# Patient Record
Sex: FEMALE | Race: WHITE | Employment: PART TIME | URBAN - METROPOLITAN AREA
[De-identification: names, ages, dates, MRNs, and addresses within clinical notes are randomized per-mention and may not be internally consistent; named-entity substitution may affect disease eponyms.]

---

## 2017-01-08 ENCOUNTER — HOSPITAL ENCOUNTER (EMERGENCY)
Age: 27
Discharge: HOME | End: 2017-01-08
Payer: COMMERCIAL

## 2017-01-08 ENCOUNTER — APPOINTMENT (OUTPATIENT)
Dept: ULTRASOUND IMAGING | Age: 27
End: 2017-01-08
Payer: COMMERCIAL

## 2017-01-08 VITALS
DIASTOLIC BLOOD PRESSURE: 85 MMHG | OXYGEN SATURATION: 100 % | WEIGHT: 170 LBS | BODY MASS INDEX: 27.32 KG/M2 | TEMPERATURE: 98.4 F | SYSTOLIC BLOOD PRESSURE: 150 MMHG | HEIGHT: 66 IN | RESPIRATION RATE: 18 BRPM | HEART RATE: 110 BPM

## 2017-01-08 DIAGNOSIS — O20.0 THREATENED MISCARRIAGE: Primary | ICD-10-CM

## 2017-01-08 DIAGNOSIS — B96.89 BACTERIAL VAGINOSIS: ICD-10-CM

## 2017-01-08 DIAGNOSIS — N76.0 BACTERIAL VAGINOSIS: ICD-10-CM

## 2017-01-08 DIAGNOSIS — O03.9 MISCARRIAGE: ICD-10-CM

## 2017-01-08 LAB
ALBUMIN SERPL-MCNC: 4.5 G/DL (ref 3.9–4.9)
ALP BLD-CCNC: 45 U/L (ref 40–130)
ALT SERPL-CCNC: 13 U/L (ref 0–33)
ANION GAP SERPL CALCULATED.3IONS-SCNC: 11 MEQ/L (ref 7–13)
AST SERPL-CCNC: 11 U/L (ref 0–35)
BACTERIA: ABNORMAL /HPF
BASOPHILS ABSOLUTE: 0.1 K/UL (ref 0–0.2)
BASOPHILS RELATIVE PERCENT: 0.7 %
BILIRUB SERPL-MCNC: 0.1 MG/DL (ref 0–1.2)
BILIRUBIN URINE: NEGATIVE
BLOOD, URINE: ABNORMAL
BUN BLDV-MCNC: 10 MG/DL (ref 6–20)
CALCIUM SERPL-MCNC: 9.2 MG/DL (ref 8.6–10.2)
CHLORIDE BLD-SCNC: 99 MEQ/L (ref 98–107)
CHP ED QC CHECK: NORMAL
CLARITY: ABNORMAL
CLUE CELLS: ABNORMAL
CO2: 24 MEQ/L (ref 22–29)
COLOR: YELLOW
CREAT SERPL-MCNC: 0.61 MG/DL (ref 0.5–0.9)
EOSINOPHILS ABSOLUTE: 0.4 K/UL (ref 0–0.7)
EOSINOPHILS RELATIVE PERCENT: 3.7 %
EPITHELIAL CELLS, UA: ABNORMAL /HPF
GFR AFRICAN AMERICAN: >60
GFR NON-AFRICAN AMERICAN: >60
GLOBULIN: 2.2 G/DL (ref 2.3–3.5)
GLUCOSE BLD-MCNC: 86 MG/DL (ref 74–109)
GLUCOSE URINE: NEGATIVE MG/DL
GONADOTROPIN, CHORIONIC (HCG) QUANT: NORMAL MIU/ML
HCT VFR BLD CALC: 37.4 % (ref 37–47)
HEMOGLOBIN: 12.8 G/DL (ref 12–16)
KETONES, URINE: ABNORMAL MG/DL
LEUKOCYTE ESTERASE, URINE: ABNORMAL
LYMPHOCYTES ABSOLUTE: 2.6 K/UL (ref 1–4.8)
LYMPHOCYTES RELATIVE PERCENT: 24.7 %
MCH RBC QN AUTO: 30.9 PG (ref 27–31.3)
MCHC RBC AUTO-ENTMCNC: 34.1 % (ref 33–37)
MCV RBC AUTO: 90.7 FL (ref 82–100)
MONOCYTES ABSOLUTE: 0.5 K/UL (ref 0.2–0.8)
MONOCYTES RELATIVE PERCENT: 4.7 %
NEUTROPHILS ABSOLUTE: 6.9 K/UL (ref 1.4–6.5)
NEUTROPHILS RELATIVE PERCENT: 66.2 %
NITRITE, URINE: NEGATIVE
PDW BLD-RTO: 12.7 % (ref 11.5–14.5)
PH UA: 7 (ref 5–9)
PLATELET # BLD: 219 K/UL (ref 130–400)
POTASSIUM SERPL-SCNC: 3.5 MEQ/L (ref 3.5–5.1)
PREGNANCY TEST URINE, POC: NORMAL
PROTEIN UA: 30 MG/DL
RBC # BLD: 4.13 M/UL (ref 4.2–5.4)
RBC UA: ABNORMAL /HPF (ref 0–2)
SODIUM BLD-SCNC: 134 MEQ/L (ref 132–144)
SPECIFIC GRAVITY UA: 1.02 (ref 1–1.03)
TOTAL PROTEIN: 6.7 G/DL (ref 6.4–8.1)
TRICHOMONAS PREP: ABNORMAL
TRICHOMONAS VAGINALIS SCREEN: NEGATIVE
URINE REFLEX TO CULTURE: YES
UROBILINOGEN, URINE: 0.2 E.U./DL
WBC # BLD: 10.4 K/UL (ref 4.8–10.8)
WBC UA: ABNORMAL /HPF (ref 0–5)
YEAST WET PREP: ABNORMAL

## 2017-01-08 PROCEDURE — 99284 EMERGENCY DEPT VISIT MOD MDM: CPT

## 2017-01-08 PROCEDURE — 87660 TRICHOMONAS VAGIN DIR PROBE: CPT

## 2017-01-08 PROCEDURE — 2580000003 HC RX 258: Performed by: PERSONAL EMERGENCY RESPONSE ATTENDANT

## 2017-01-08 PROCEDURE — 84702 CHORIONIC GONADOTROPIN TEST: CPT

## 2017-01-08 PROCEDURE — 86901 BLOOD TYPING SEROLOGIC RH(D): CPT

## 2017-01-08 PROCEDURE — 85025 COMPLETE CBC W/AUTO DIFF WBC: CPT

## 2017-01-08 PROCEDURE — 87086 URINE CULTURE/COLONY COUNT: CPT

## 2017-01-08 PROCEDURE — 80053 COMPREHEN METABOLIC PANEL: CPT

## 2017-01-08 PROCEDURE — 81001 URINALYSIS AUTO W/SCOPE: CPT

## 2017-01-08 PROCEDURE — 76817 TRANSVAGINAL US OBSTETRIC: CPT

## 2017-01-08 PROCEDURE — 36415 COLL VENOUS BLD VENIPUNCTURE: CPT

## 2017-01-08 PROCEDURE — 87491 CHLMYD TRACH DNA AMP PROBE: CPT

## 2017-01-08 PROCEDURE — 87591 N.GONORRHOEAE DNA AMP PROB: CPT

## 2017-01-08 PROCEDURE — 87210 SMEAR WET MOUNT SALINE/INK: CPT

## 2017-01-08 PROCEDURE — 76801 OB US < 14 WKS SINGLE FETUS: CPT

## 2017-01-08 PROCEDURE — 86900 BLOOD TYPING SEROLOGIC ABO: CPT

## 2017-01-08 RX ORDER — 0.9 % SODIUM CHLORIDE 0.9 %
1000 INTRAVENOUS SOLUTION INTRAVENOUS ONCE
Status: COMPLETED | OUTPATIENT
Start: 2017-01-08 | End: 2017-01-08

## 2017-01-08 RX ORDER — NITROFURANTOIN 25; 75 MG/1; MG/1
100 CAPSULE ORAL 2 TIMES DAILY
Qty: 14 CAPSULE | Refills: 0 | Status: SHIPPED | OUTPATIENT
Start: 2017-01-08 | End: 2017-01-12

## 2017-01-08 RX ORDER — METRONIDAZOLE 7.5 MG/G
GEL VAGINAL
Qty: 1 G | Refills: 0 | Status: SHIPPED | OUTPATIENT
Start: 2017-01-08 | End: 2017-01-15

## 2017-01-08 RX ADMIN — SODIUM CHLORIDE 1000 ML: 900 INJECTION, SOLUTION INTRAVENOUS at 20:45

## 2017-01-08 ASSESSMENT — ENCOUNTER SYMPTOMS
FACIAL SWELLING: 0
COUGH: 0
ABDOMINAL PAIN: 1
NAUSEA: 0
SORE THROAT: 0
ANAL BLEEDING: 0
TROUBLE SWALLOWING: 0
BLOOD IN STOOL: 0
DIARRHEA: 0
COLOR CHANGE: 0
VOICE CHANGE: 0
SHORTNESS OF BREATH: 0
VOMITING: 0

## 2017-01-09 LAB — ABO/RH: NORMAL

## 2017-01-10 LAB
CHLAMYDIA TRACHOMATIS AMPLIFIED DET: NEGATIVE
N GONORRHOEAE AMPLIFIED DET: NEGATIVE
SPECIMEN SOURCE: NORMAL
URINE CULTURE, ROUTINE: NORMAL

## 2017-01-17 ENCOUNTER — OFFICE VISIT (OUTPATIENT)
Dept: OBGYN | Age: 27
End: 2017-01-17

## 2017-01-17 VITALS
BODY MASS INDEX: 28.41 KG/M2 | SYSTOLIC BLOOD PRESSURE: 124 MMHG | WEIGHT: 176 LBS | DIASTOLIC BLOOD PRESSURE: 66 MMHG | HEART RATE: 96 BPM

## 2017-01-17 DIAGNOSIS — N91.2 AMENORRHEA: Primary | ICD-10-CM

## 2017-01-17 DIAGNOSIS — N91.2 AMENORRHEA: ICD-10-CM

## 2017-01-17 LAB
ABO/RH: NORMAL
ANTIBODY SCREEN: NORMAL
BASOPHILS ABSOLUTE: 0.1 K/UL (ref 0–0.2)
BASOPHILS RELATIVE PERCENT: 0.9 %
EOSINOPHILS ABSOLUTE: 0.2 K/UL (ref 0–0.7)
EOSINOPHILS RELATIVE PERCENT: 2.8 %
HCT VFR BLD CALC: 37.8 % (ref 37–47)
HEMOGLOBIN: 12.7 G/DL (ref 12–16)
HEPATITIS B SURFACE ANTIGEN INTERPRETATION: NORMAL
LYMPHOCYTES ABSOLUTE: 2.3 K/UL (ref 1–4.8)
LYMPHOCYTES RELATIVE PERCENT: 27 %
MCH RBC QN AUTO: 30.6 PG (ref 27–31.3)
MCHC RBC AUTO-ENTMCNC: 33.5 % (ref 33–37)
MCV RBC AUTO: 91.3 FL (ref 82–100)
MONOCYTES ABSOLUTE: 0.5 K/UL (ref 0.2–0.8)
MONOCYTES RELATIVE PERCENT: 5.4 %
NEUTROPHILS ABSOLUTE: 5.6 K/UL (ref 1.4–6.5)
NEUTROPHILS RELATIVE PERCENT: 63.9 %
PDW BLD-RTO: 12.3 % (ref 11.5–14.5)
PLATELET # BLD: 237 K/UL (ref 130–400)
RBC # BLD: 4.14 M/UL (ref 4.2–5.4)
RUBELLA ANTIBODY IGG: 228.3 IU/ML
WBC # BLD: 8.7 K/UL (ref 4.8–10.8)

## 2017-01-17 PROCEDURE — 99213 OFFICE O/P EST LOW 20 MIN: CPT | Performed by: OBSTETRICS & GYNECOLOGY

## 2017-01-17 RX ORDER — ASPIRIN 81 MG/1
81 TABLET ORAL DAILY
Qty: 30 TABLET | Refills: 3 | Status: ON HOLD | OUTPATIENT
Start: 2017-01-17 | End: 2017-08-17

## 2017-01-18 DIAGNOSIS — N91.2 AMENORRHEA: ICD-10-CM

## 2017-01-18 LAB
AMPHETAMINE SCREEN, URINE: NORMAL
BACTERIA: ABNORMAL /HPF
BARBITURATE SCREEN URINE: NORMAL
BENZODIAZEPINE SCREEN, URINE: NORMAL
BILIRUBIN URINE: NEGATIVE
BLOOD, URINE: ABNORMAL
CANNABINOID SCREEN URINE: NORMAL
CLARITY: ABNORMAL
COCAINE METABOLITE SCREEN URINE: NORMAL
COLOR: YELLOW
CRYSTALS, UA: ABNORMAL
EPITHELIAL CELLS, UA: ABNORMAL /HPF
GLUCOSE URINE: NEGATIVE MG/DL
HEMOGLOBIN A-1 QUANTITATION: 96.9 % (ref 95–97.9)
HEMOGLOBIN A2 QUANTITATION: 2.8 % (ref 2–3.5)
HEMOGLOBIN C QUANTITATION: 0 % (ref 0–0)
HEMOGLOBIN E QUANTITATION: 0 % (ref 0–0)
HEMOGLOBIN ELECTROPHORESIS: NORMAL
HEMOGLOBIN EVALUATION: NORMAL
HEMOGLOBIN F QUANTITATION: 0.3 % (ref 0–2.1)
HEMOGLOBIN OTHER: 0 % (ref 0–0)
HEMOGLOBIN S QUANTITATION: 0 % (ref 0–0)
KETONES, URINE: NEGATIVE MG/DL
LEUKOCYTE ESTERASE, URINE: ABNORMAL
Lab: NORMAL
NITRITE, URINE: NEGATIVE
OPIATE SCREEN URINE: NORMAL
PH UA: 7.5 (ref 5–9)
PHENCYCLIDINE SCREEN URINE: NORMAL
PROTEIN UA: NEGATIVE MG/DL
RBC UA: ABNORMAL /HPF (ref 0–2)
RENAL EPITHELIAL, UA: ABNORMAL /HPF
RPR: NORMAL
SICKLE CELL: NORMAL
SPECIFIC GRAVITY UA: 1.02 (ref 1–1.03)
UROBILINOGEN, URINE: 0.2 E.U./DL
VZV IGG SER QL IA: 868 IV
WBC UA: ABNORMAL /HPF (ref 0–5)

## 2017-01-19 LAB
HIV-1 AND HIV-2 ANTIBODIES: NEGATIVE
URINE CULTURE, ROUTINE: NORMAL

## 2017-01-31 ENCOUNTER — INITIAL PRENATAL (OUTPATIENT)
Dept: OBGYN | Age: 27
End: 2017-01-31

## 2017-01-31 VITALS
DIASTOLIC BLOOD PRESSURE: 80 MMHG | WEIGHT: 171 LBS | HEART RATE: 100 BPM | SYSTOLIC BLOOD PRESSURE: 122 MMHG | BODY MASS INDEX: 27.6 KG/M2

## 2017-01-31 DIAGNOSIS — Z34.81 ENCOUNTER FOR SUPERVISION OF OTHER NORMAL PREGNANCY IN FIRST TRIMESTER: Primary | ICD-10-CM

## 2017-01-31 LAB
BILIRUBIN, POC: NORMAL
BLOOD URINE, POC: NORMAL
CLARITY, POC: NORMAL
COLOR, POC: YELLOW
GLUCOSE URINE, POC: NORMAL
KETONES, POC: NORMAL
LEUKOCYTE EST, POC: NORMAL
NITRITE, POC: NORMAL
PH, POC: 6
PROTEIN, POC: NORMAL
SPECIFIC GRAVITY, POC: 1.03
UROBILINOGEN, POC: NORMAL

## 2017-02-27 ENCOUNTER — ROUTINE PRENATAL (OUTPATIENT)
Dept: OBGYN | Age: 27
End: 2017-02-27

## 2017-02-27 VITALS
BODY MASS INDEX: 27.92 KG/M2 | SYSTOLIC BLOOD PRESSURE: 122 MMHG | HEART RATE: 120 BPM | WEIGHT: 173 LBS | DIASTOLIC BLOOD PRESSURE: 84 MMHG

## 2017-02-27 DIAGNOSIS — Z34.81 ENCOUNTER FOR SUPERVISION OF OTHER NORMAL PREGNANCY IN FIRST TRIMESTER: Primary | ICD-10-CM

## 2017-02-27 DIAGNOSIS — N89.8 VAGINAL DISCHARGE: ICD-10-CM

## 2017-02-27 PROCEDURE — 99213 OFFICE O/P EST LOW 20 MIN: CPT | Performed by: OBSTETRICS & GYNECOLOGY

## 2017-02-27 RX ORDER — CEFUROXIME AXETIL 250 MG/1
250 TABLET ORAL 2 TIMES DAILY
COMMUNITY
End: 2017-07-07

## 2017-02-27 RX ORDER — ONDANSETRON 4 MG/1
4 TABLET, ORALLY DISINTEGRATING ORAL EVERY 8 HOURS PRN
COMMUNITY
End: 2017-07-07

## 2017-03-06 ENCOUNTER — TELEPHONE (OUTPATIENT)
Dept: OBGYN | Age: 27
End: 2017-03-06

## 2017-03-09 ENCOUNTER — TELEPHONE (OUTPATIENT)
Dept: OBGYN | Age: 27
End: 2017-03-09

## 2017-03-09 DIAGNOSIS — R30.0 BURNING WITH URINATION: ICD-10-CM

## 2017-03-09 DIAGNOSIS — R35.0 URINARY FREQUENCY: Primary | ICD-10-CM

## 2017-03-09 DIAGNOSIS — R35.0 URINARY FREQUENCY: ICD-10-CM

## 2017-03-09 DIAGNOSIS — Z34.81 ENCOUNTER FOR SUPERVISION OF OTHER NORMAL PREGNANCY IN FIRST TRIMESTER: ICD-10-CM

## 2017-03-09 LAB
AMORPHOUS: NORMAL
BACTERIA: NORMAL /HPF
BILIRUBIN URINE: NEGATIVE
BILIRUBIN, POC: ABNORMAL
BLOOD URINE, POC: ABNORMAL
BLOOD, URINE: NEGATIVE
CLARITY, POC: ABNORMAL
CLARITY: ABNORMAL
COLOR, POC: YELLOW
COLOR: YELLOW
EPITHELIAL CELLS, UA: NORMAL /HPF
GLUCOSE URINE, POC: ABNORMAL
GLUCOSE URINE: NEGATIVE MG/DL
KETONES, POC: ABNORMAL
KETONES, URINE: NEGATIVE MG/DL
LEUKOCYTE EST, POC: ABNORMAL
LEUKOCYTE ESTERASE, URINE: ABNORMAL
NITRITE, POC: ABNORMAL
NITRITE, URINE: NEGATIVE
PH UA: 8 (ref 5–9)
PH, POC: 8
PROTEIN UA: ABNORMAL MG/DL
PROTEIN, POC: ABNORMAL
RBC UA: NORMAL /HPF (ref 0–2)
SPECIFIC GRAVITY UA: 1.02 (ref 1–1.03)
SPECIFIC GRAVITY, POC: 1.01
UROBILINOGEN, POC: ABNORMAL
UROBILINOGEN, URINE: 0.2 E.U./DL
WBC UA: NORMAL /HPF (ref 0–5)

## 2017-03-09 PROCEDURE — 81003 URINALYSIS AUTO W/O SCOPE: CPT | Performed by: OBSTETRICS & GYNECOLOGY

## 2017-03-09 RX ORDER — NITROFURANTOIN 25; 75 MG/1; MG/1
100 CAPSULE ORAL 2 TIMES DAILY
Qty: 20 CAPSULE | Refills: 0 | Status: SHIPPED | OUTPATIENT
Start: 2017-03-09 | End: 2017-03-19

## 2017-03-11 LAB — URINE CULTURE, ROUTINE: NORMAL

## 2017-03-20 ENCOUNTER — HOSPITAL ENCOUNTER (OUTPATIENT)
Dept: ULTRASOUND IMAGING | Age: 27
Discharge: HOME | End: 2017-03-20
Payer: MEDICAID

## 2017-03-20 PROCEDURE — 76805 OB US >/= 14 WKS SNGL FETUS: CPT

## 2017-03-27 ENCOUNTER — ROUTINE PRENATAL (OUTPATIENT)
Dept: OBGYN | Age: 27
End: 2017-03-27

## 2017-03-27 VITALS
BODY MASS INDEX: 27.76 KG/M2 | WEIGHT: 172 LBS | DIASTOLIC BLOOD PRESSURE: 74 MMHG | SYSTOLIC BLOOD PRESSURE: 102 MMHG | HEART RATE: 120 BPM

## 2017-03-27 DIAGNOSIS — Z34.82 ENCOUNTER FOR SUPERVISION OF OTHER NORMAL PREGNANCY IN SECOND TRIMESTER: ICD-10-CM

## 2017-03-27 DIAGNOSIS — Z34.82 ENCOUNTER FOR SUPERVISION OF OTHER NORMAL PREGNANCY IN SECOND TRIMESTER: Primary | ICD-10-CM

## 2017-03-27 LAB
BACTERIA: NORMAL /HPF
BILIRUBIN URINE: NEGATIVE
BILIRUBIN, POC: ABNORMAL
BLOOD URINE, POC: ABNORMAL
BLOOD, URINE: NEGATIVE
CLARITY, POC: CLEAR
CLARITY: ABNORMAL
COLOR, POC: YELLOW
COLOR: YELLOW
EPITHELIAL CELLS, UA: NORMAL /HPF
GLUCOSE URINE, POC: ABNORMAL
GLUCOSE URINE: NEGATIVE MG/DL
GLUCOSE, 1HR PP: 124 MG/DL (ref 60–140)
KETONES, POC: ABNORMAL
KETONES, URINE: NEGATIVE MG/DL
LEUKOCYTE EST, POC: ABNORMAL
LEUKOCYTE ESTERASE, URINE: ABNORMAL
NITRITE, POC: ABNORMAL
NITRITE, URINE: NEGATIVE
PH UA: 6.5 (ref 5–9)
PH, POC: 6
PROTEIN UA: ABNORMAL MG/DL
PROTEIN, POC: ABNORMAL
RBC UA: NORMAL /HPF (ref 0–2)
SPECIFIC GRAVITY UA: 1.02 (ref 1–1.03)
SPECIFIC GRAVITY, POC: 1.02
UROBILINOGEN, POC: ABNORMAL
UROBILINOGEN, URINE: 0.2 E.U./DL
WBC UA: NORMAL /HPF (ref 0–5)

## 2017-03-27 PROCEDURE — 99213 OFFICE O/P EST LOW 20 MIN: CPT | Performed by: OBSTETRICS & GYNECOLOGY

## 2017-03-29 LAB — URINE CULTURE, ROUTINE: NORMAL

## 2017-03-29 RX ORDER — NITROFURANTOIN 25; 75 MG/1; MG/1
100 CAPSULE ORAL 2 TIMES DAILY
Qty: 20 CAPSULE | Refills: 0 | Status: SHIPPED | OUTPATIENT
Start: 2017-03-29 | End: 2017-04-08

## 2017-04-17 ENCOUNTER — HOSPITAL ENCOUNTER (EMERGENCY)
Age: 27
Discharge: HOME | End: 2017-04-17
Payer: MEDICAID

## 2017-04-17 ENCOUNTER — APPOINTMENT (OUTPATIENT)
Dept: GENERAL RADIOLOGY | Age: 27
End: 2017-04-17
Payer: MEDICAID

## 2017-04-17 VITALS
RESPIRATION RATE: 14 BRPM | HEIGHT: 66 IN | HEART RATE: 93 BPM | DIASTOLIC BLOOD PRESSURE: 69 MMHG | SYSTOLIC BLOOD PRESSURE: 111 MMHG | OXYGEN SATURATION: 98 % | TEMPERATURE: 98.3 F | WEIGHT: 174 LBS | BODY MASS INDEX: 27.97 KG/M2

## 2017-04-17 DIAGNOSIS — M94.0 COSTOCHONDRITIS: Primary | ICD-10-CM

## 2017-04-17 LAB
ALBUMIN SERPL-MCNC: 3.2 G/DL (ref 3.9–4.9)
ALP BLD-CCNC: 52 U/L (ref 40–130)
ALT SERPL-CCNC: 10 U/L (ref 0–33)
AMORPHOUS: NORMAL
ANION GAP SERPL CALCULATED.3IONS-SCNC: 9 MEQ/L (ref 7–13)
AST SERPL-CCNC: 9 U/L (ref 0–35)
BACTERIA: NORMAL /HPF
BASOPHILS ABSOLUTE: 0.1 K/UL (ref 0–0.2)
BASOPHILS RELATIVE PERCENT: 0.6 %
BILIRUB SERPL-MCNC: 0.1 MG/DL (ref 0–1.2)
BILIRUBIN URINE: NEGATIVE
BLOOD, URINE: NEGATIVE
BUN BLDV-MCNC: 8 MG/DL (ref 6–20)
CALCIUM SERPL-MCNC: 8.8 MG/DL (ref 8.6–10.2)
CHLORIDE BLD-SCNC: 103 MEQ/L (ref 98–107)
CLARITY: ABNORMAL
CO2: 23 MEQ/L (ref 22–29)
COLOR: YELLOW
CREAT SERPL-MCNC: 0.71 MG/DL (ref 0.5–0.9)
D DIMER: 0.42 MG/L FEU (ref 0–0.5)
EOSINOPHILS ABSOLUTE: 0.2 K/UL (ref 0–0.7)
EOSINOPHILS RELATIVE PERCENT: 2.3 %
EPITHELIAL CELLS, UA: NORMAL /HPF
GFR AFRICAN AMERICAN: >60
GFR NON-AFRICAN AMERICAN: >60
GLOBULIN: 2.9 G/DL (ref 2.3–3.5)
GLUCOSE BLD-MCNC: 85 MG/DL (ref 74–109)
GLUCOSE URINE: NEGATIVE MG/DL
HCT VFR BLD CALC: 35.4 % (ref 37–47)
HEMOGLOBIN: 11.9 G/DL (ref 12–16)
KETONES, URINE: NEGATIVE MG/DL
LEUKOCYTE ESTERASE, URINE: ABNORMAL
LYMPHOCYTES ABSOLUTE: 2.4 K/UL (ref 1–4.8)
LYMPHOCYTES RELATIVE PERCENT: 22.2 %
MCH RBC QN AUTO: 30.4 PG (ref 27–31.3)
MCHC RBC AUTO-ENTMCNC: 33.7 % (ref 33–37)
MCV RBC AUTO: 90.2 FL (ref 82–100)
MONOCYTES ABSOLUTE: 0.6 K/UL (ref 0.2–0.8)
MONOCYTES RELATIVE PERCENT: 5.8 %
NEUTROPHILS ABSOLUTE: 7.4 K/UL (ref 1.4–6.5)
NEUTROPHILS RELATIVE PERCENT: 69.1 %
NITRITE, URINE: NEGATIVE
PDW BLD-RTO: 12.6 % (ref 11.5–14.5)
PH UA: 7.5 (ref 5–9)
PLATELET # BLD: 201 K/UL (ref 130–400)
POTASSIUM SERPL-SCNC: 3.9 MEQ/L (ref 3.5–5.1)
PROTEIN UA: NEGATIVE MG/DL
RBC # BLD: 3.92 M/UL (ref 4.2–5.4)
RBC UA: NORMAL /HPF (ref 0–2)
SODIUM BLD-SCNC: 135 MEQ/L (ref 132–144)
SPECIFIC GRAVITY UA: 1.02 (ref 1–1.03)
TOTAL CK: 20 U/L (ref 0–170)
TOTAL PROTEIN: 6.1 G/DL (ref 6.4–8.1)
TROPONIN: <0.01 NG/ML (ref 0–0.01)
URINE REFLEX TO CULTURE: YES
UROBILINOGEN, URINE: 0.2 E.U./DL
WBC # BLD: 10.7 K/UL (ref 4.8–10.8)
WBC UA: NORMAL /HPF (ref 0–5)

## 2017-04-17 PROCEDURE — 80053 COMPREHEN METABOLIC PANEL: CPT

## 2017-04-17 PROCEDURE — 85025 COMPLETE CBC W/AUTO DIFF WBC: CPT

## 2017-04-17 PROCEDURE — 36415 COLL VENOUS BLD VENIPUNCTURE: CPT

## 2017-04-17 PROCEDURE — 84484 ASSAY OF TROPONIN QUANT: CPT

## 2017-04-17 PROCEDURE — 87086 URINE CULTURE/COLONY COUNT: CPT

## 2017-04-17 PROCEDURE — 71010 XR CHEST LIMITED: CPT

## 2017-04-17 PROCEDURE — 6360000002 HC RX W HCPCS: Performed by: PHYSICIAN ASSISTANT

## 2017-04-17 PROCEDURE — 81001 URINALYSIS AUTO W/SCOPE: CPT

## 2017-04-17 PROCEDURE — 96374 THER/PROPH/DIAG INJ IV PUSH: CPT

## 2017-04-17 PROCEDURE — 85379 FIBRIN DEGRADATION QUANT: CPT

## 2017-04-17 PROCEDURE — 99285 EMERGENCY DEPT VISIT HI MDM: CPT

## 2017-04-17 PROCEDURE — 93005 ELECTROCARDIOGRAM TRACING: CPT

## 2017-04-17 PROCEDURE — 82550 ASSAY OF CK (CPK): CPT

## 2017-04-17 PROCEDURE — 2580000003 HC RX 258: Performed by: PHYSICIAN ASSISTANT

## 2017-04-17 PROCEDURE — 96375 TX/PRO/DX INJ NEW DRUG ADDON: CPT

## 2017-04-17 RX ORDER — 0.9 % SODIUM CHLORIDE 0.9 %
500 INTRAVENOUS SOLUTION INTRAVENOUS ONCE
Status: COMPLETED | OUTPATIENT
Start: 2017-04-17 | End: 2017-04-17

## 2017-04-17 RX ORDER — ACETAMINOPHEN AND CODEINE PHOSPHATE 300; 30 MG/1; MG/1
1 TABLET ORAL 3 TIMES DAILY PRN
Qty: 10 TABLET | Refills: 0 | Status: SHIPPED | OUTPATIENT
Start: 2017-04-17 | End: 2017-07-07

## 2017-04-17 RX ORDER — MORPHINE SULFATE 4 MG/ML
4 INJECTION, SOLUTION INTRAMUSCULAR; INTRAVENOUS ONCE
Status: COMPLETED | OUTPATIENT
Start: 2017-04-17 | End: 2017-04-17

## 2017-04-17 RX ORDER — ONDANSETRON 2 MG/ML
4 INJECTION INTRAMUSCULAR; INTRAVENOUS ONCE
Status: COMPLETED | OUTPATIENT
Start: 2017-04-17 | End: 2017-04-17

## 2017-04-17 RX ADMIN — SODIUM CHLORIDE 500 ML: 9 INJECTION, SOLUTION INTRAVENOUS at 19:06

## 2017-04-17 RX ADMIN — MORPHINE SULFATE 4 MG: 4 INJECTION, SOLUTION INTRAMUSCULAR; INTRAVENOUS at 19:15

## 2017-04-17 RX ADMIN — ONDANSETRON 4 MG: 2 INJECTION, SOLUTION INTRAMUSCULAR; INTRAVENOUS at 19:15

## 2017-04-17 ASSESSMENT — PAIN DESCRIPTION - ORIENTATION: ORIENTATION: LEFT;OUTER

## 2017-04-17 ASSESSMENT — ENCOUNTER SYMPTOMS
COLOR CHANGE: 0
SHORTNESS OF BREATH: 1
ABDOMINAL DISTENTION: 0
SORE THROAT: 0
STRIDOR: 0
TROUBLE SWALLOWING: 0
CONSTIPATION: 1
ABDOMINAL PAIN: 1
CHOKING: 0
COUGH: 0
VOMITING: 0
CHEST TIGHTNESS: 1
RHINORRHEA: 0
NAUSEA: 0
DIARRHEA: 0
WHEEZING: 0

## 2017-04-17 ASSESSMENT — PAIN SCALES - GENERAL
PAINLEVEL_OUTOF10: 6
PAINLEVEL_OUTOF10: 8

## 2017-04-17 ASSESSMENT — PAIN DESCRIPTION - DESCRIPTORS: DESCRIPTORS: STABBING;ACHING

## 2017-04-17 ASSESSMENT — PAIN DESCRIPTION - PAIN TYPE: TYPE: ACUTE PAIN

## 2017-04-17 ASSESSMENT — PAIN DESCRIPTION - LOCATION: LOCATION: CHEST

## 2017-04-17 ASSESSMENT — PAIN DESCRIPTION - FREQUENCY: FREQUENCY: CONTINUOUS

## 2017-04-18 LAB
EKG ATRIAL RATE: 90 BPM
EKG P AXIS: 1 DEGREES
EKG P-R INTERVAL: 102 MS
EKG Q-T INTERVAL: 382 MS
EKG QRS DURATION: 76 MS
EKG QTC CALCULATION (BAZETT): 467 MS
EKG R AXIS: 40 DEGREES
EKG T AXIS: 28 DEGREES
EKG VENTRICULAR RATE: 90 BPM

## 2017-04-19 LAB — URINE CULTURE, ROUTINE: NORMAL

## 2017-04-24 ENCOUNTER — ROUTINE PRENATAL (OUTPATIENT)
Dept: OBGYN | Age: 27
End: 2017-04-24

## 2017-04-24 VITALS
BODY MASS INDEX: 28.89 KG/M2 | DIASTOLIC BLOOD PRESSURE: 70 MMHG | HEART RATE: 96 BPM | SYSTOLIC BLOOD PRESSURE: 124 MMHG | WEIGHT: 179 LBS

## 2017-04-24 DIAGNOSIS — Z34.82 ENCOUNTER FOR SUPERVISION OF OTHER NORMAL PREGNANCY, SECOND TRIMESTER: Primary | ICD-10-CM

## 2017-04-24 LAB
BILIRUBIN, POC: ABNORMAL
BLOOD URINE, POC: ABNORMAL
CLARITY, POC: CLEAR
COLOR, POC: YELLOW
GLUCOSE URINE, POC: ABNORMAL
KETONES, POC: ABNORMAL
LEUKOCYTE EST, POC: ABNORMAL
NITRITE, POC: ABNORMAL
PH, POC: 8
PROTEIN, POC: ABNORMAL
SPECIFIC GRAVITY, POC: 1.01
UROBILINOGEN, POC: ABNORMAL

## 2017-04-24 PROCEDURE — 99213 OFFICE O/P EST LOW 20 MIN: CPT | Performed by: OBSTETRICS & GYNECOLOGY

## 2017-04-25 DIAGNOSIS — Z34.82 ENCOUNTER FOR SUPERVISION OF OTHER NORMAL PREGNANCY, SECOND TRIMESTER: ICD-10-CM

## 2017-04-25 LAB
AMORPHOUS: NORMAL
BACTERIA: NORMAL /HPF
BILIRUBIN URINE: NEGATIVE
BLOOD, URINE: NEGATIVE
CLARITY: ABNORMAL
COLOR: YELLOW
EPITHELIAL CELLS, UA: NORMAL /HPF
GLUCOSE URINE: NEGATIVE MG/DL
KETONES, URINE: NEGATIVE MG/DL
LEUKOCYTE ESTERASE, URINE: ABNORMAL
NITRITE, URINE: NEGATIVE
PH UA: 7.5 (ref 5–9)
PROTEIN UA: NEGATIVE MG/DL
RBC UA: NORMAL /HPF (ref 0–2)
SPECIFIC GRAVITY UA: 1.02 (ref 1–1.03)
UROBILINOGEN, URINE: 1 E.U./DL
WBC UA: NORMAL /HPF (ref 0–5)

## 2017-04-26 LAB — URINE CULTURE, ROUTINE: NORMAL

## 2017-05-11 ENCOUNTER — HOSPITAL ENCOUNTER (OUTPATIENT)
Dept: ULTRASOUND IMAGING | Age: 27
Discharge: HOME | End: 2017-05-11
Payer: MEDICAID

## 2017-05-11 DIAGNOSIS — Z34.82 ENCOUNTER FOR SUPERVISION OF OTHER NORMAL PREGNANCY, SECOND TRIMESTER: ICD-10-CM

## 2017-05-11 PROCEDURE — 76805 OB US >/= 14 WKS SNGL FETUS: CPT

## 2017-05-15 ENCOUNTER — ROUTINE PRENATAL (OUTPATIENT)
Dept: OBGYN | Age: 27
End: 2017-05-15

## 2017-05-15 VITALS
BODY MASS INDEX: 29.86 KG/M2 | WEIGHT: 185 LBS | DIASTOLIC BLOOD PRESSURE: 82 MMHG | SYSTOLIC BLOOD PRESSURE: 120 MMHG | HEART RATE: 112 BPM

## 2017-05-15 DIAGNOSIS — Z34.82 ENCOUNTER FOR SUPERVISION OF OTHER NORMAL PREGNANCY, SECOND TRIMESTER: ICD-10-CM

## 2017-05-15 DIAGNOSIS — Z34.82 ENCOUNTER FOR SUPERVISION OF OTHER NORMAL PREGNANCY, SECOND TRIMESTER: Primary | ICD-10-CM

## 2017-05-15 LAB
BASOPHILS ABSOLUTE: 0.1 K/UL (ref 0–0.2)
BASOPHILS RELATIVE PERCENT: 0.4 %
BILIRUBIN, POC: ABNORMAL
BLOOD URINE, POC: ABNORMAL
CLARITY, POC: CLEAR
COLOR, POC: YELLOW
EOSINOPHILS ABSOLUTE: 0.1 K/UL (ref 0–0.7)
EOSINOPHILS RELATIVE PERCENT: 1.3 %
GLUCOSE URINE, POC: ABNORMAL
GLUCOSE, 1HR PP: 98 MG/DL (ref 60–140)
HCT VFR BLD CALC: 38.1 % (ref 37–47)
HEMOGLOBIN: 12.3 G/DL (ref 12–16)
KETONES, POC: ABNORMAL
LEUKOCYTE EST, POC: ABNORMAL
LYMPHOCYTES ABSOLUTE: 2.2 K/UL (ref 1–4.8)
LYMPHOCYTES RELATIVE PERCENT: 19 %
MCH RBC QN AUTO: 30.3 PG (ref 27–31.3)
MCHC RBC AUTO-ENTMCNC: 32.2 % (ref 33–37)
MCV RBC AUTO: 94.1 FL (ref 82–100)
MONOCYTES ABSOLUTE: 0.6 K/UL (ref 0.2–0.8)
MONOCYTES RELATIVE PERCENT: 5.2 %
NEUTROPHILS ABSOLUTE: 8.5 K/UL (ref 1.4–6.5)
NEUTROPHILS RELATIVE PERCENT: 74.1 %
NITRITE, POC: ABNORMAL
PDW BLD-RTO: 12.7 % (ref 11.5–14.5)
PH, POC: 8
PLATELET # BLD: 212 K/UL (ref 130–400)
PROTEIN, POC: ABNORMAL
RBC # BLD: 4.05 M/UL (ref 4.2–5.4)
SPECIFIC GRAVITY, POC: 1.01
UROBILINOGEN, POC: ABNORMAL
WBC # BLD: 11.5 K/UL (ref 4.8–10.8)

## 2017-05-15 PROCEDURE — 99213 OFFICE O/P EST LOW 20 MIN: CPT | Performed by: OBSTETRICS & GYNECOLOGY

## 2017-05-16 DIAGNOSIS — Z34.82 ENCOUNTER FOR SUPERVISION OF OTHER NORMAL PREGNANCY, SECOND TRIMESTER: ICD-10-CM

## 2017-05-16 LAB
BACTERIA: NORMAL /HPF
BILIRUBIN URINE: NEGATIVE
BLOOD, URINE: NEGATIVE
CLARITY: CLEAR
COLOR: YELLOW
EPITHELIAL CELLS, UA: NORMAL /HPF
GLUCOSE URINE: NEGATIVE MG/DL
KETONES, URINE: NEGATIVE MG/DL
LEUKOCYTE ESTERASE, URINE: ABNORMAL
NITRITE, URINE: NEGATIVE
PH UA: 8 (ref 5–9)
PROTEIN UA: NEGATIVE MG/DL
RBC UA: NORMAL /HPF (ref 0–2)
SPECIFIC GRAVITY UA: 1.02 (ref 1–1.03)
UROBILINOGEN, URINE: 0.2 E.U./DL
WBC UA: NORMAL /HPF (ref 0–5)

## 2017-05-17 LAB — URINE CULTURE, ROUTINE: NORMAL

## 2017-06-04 ENCOUNTER — HOSPITAL ENCOUNTER (OUTPATIENT)
Age: 27
Discharge: HOME | End: 2017-06-05
Attending: OBSTETRICS & GYNECOLOGY | Admitting: OBSTETRICS & GYNECOLOGY
Payer: MEDICAID

## 2017-06-04 PROCEDURE — 99213 OFFICE O/P EST LOW 20 MIN: CPT | Performed by: OBSTETRICS & GYNECOLOGY

## 2017-06-04 PROCEDURE — 59025 FETAL NON-STRESS TEST: CPT | Performed by: OBSTETRICS & GYNECOLOGY

## 2017-06-04 RX ORDER — ACETAMINOPHEN 325 MG/1
650 TABLET ORAL EVERY 4 HOURS PRN
Status: DISCONTINUED | OUTPATIENT
Start: 2017-06-04 | End: 2017-06-05 | Stop reason: HOSPADM

## 2017-06-04 RX ORDER — ONDANSETRON 2 MG/ML
4 INJECTION INTRAMUSCULAR; INTRAVENOUS EVERY 6 HOURS PRN
Status: DISCONTINUED | OUTPATIENT
Start: 2017-06-04 | End: 2017-06-05 | Stop reason: HOSPADM

## 2017-06-05 ENCOUNTER — ROUTINE PRENATAL (OUTPATIENT)
Dept: OBGYN | Age: 27
End: 2017-06-05

## 2017-06-05 VITALS
DIASTOLIC BLOOD PRESSURE: 76 MMHG | WEIGHT: 191 LBS | SYSTOLIC BLOOD PRESSURE: 124 MMHG | HEART RATE: 108 BPM | BODY MASS INDEX: 30.83 KG/M2

## 2017-06-05 VITALS
RESPIRATION RATE: 16 BRPM | HEIGHT: 66 IN | SYSTOLIC BLOOD PRESSURE: 136 MMHG | BODY MASS INDEX: 29.73 KG/M2 | DIASTOLIC BLOOD PRESSURE: 79 MMHG | HEART RATE: 94 BPM | WEIGHT: 185 LBS | TEMPERATURE: 97.8 F

## 2017-06-05 DIAGNOSIS — Z34.83 ENCOUNTER FOR SUPERVISION OF OTHER NORMAL PREGNANCY, THIRD TRIMESTER: Primary | ICD-10-CM

## 2017-06-05 DIAGNOSIS — Z34.83 ENCOUNTER FOR SUPERVISION OF OTHER NORMAL PREGNANCY, THIRD TRIMESTER: ICD-10-CM

## 2017-06-05 DIAGNOSIS — O47.00 PRETERM UTERINE CONTRACTIONS: ICD-10-CM

## 2017-06-05 LAB
AMORPHOUS: NORMAL
BACTERIA: NORMAL /HPF
BILIRUBIN URINE: NEGATIVE
BILIRUBIN, POC: ABNORMAL
BLOOD URINE, POC: ABNORMAL
BLOOD, URINE: NEGATIVE
CLARITY, POC: CLEAR
CLARITY: ABNORMAL
COLOR, POC: YELLOW
COLOR: YELLOW
EPITHELIAL CELLS, UA: NORMAL /HPF
GLUCOSE URINE, POC: ABNORMAL
GLUCOSE URINE: NEGATIVE MG/DL
KETONES, POC: ABNORMAL
KETONES, URINE: NEGATIVE MG/DL
LEUKOCYTE EST, POC: ABNORMAL
LEUKOCYTE ESTERASE, URINE: ABNORMAL
NITRITE, POC: ABNORMAL
NITRITE, URINE: NEGATIVE
PH UA: 8.5 (ref 5–9)
PH, POC: 9
PROTEIN UA: ABNORMAL MG/DL
PROTEIN, POC: ABNORMAL
RBC UA: NORMAL /HPF (ref 0–2)
SPECIFIC GRAVITY UA: 1.02 (ref 1–1.03)
SPECIFIC GRAVITY, POC: 1.01
UROBILINOGEN, POC: ABNORMAL
UROBILINOGEN, URINE: 0.2 E.U./DL
WBC UA: NORMAL /HPF (ref 0–5)

## 2017-06-05 PROCEDURE — 99283 EMERGENCY DEPT VISIT LOW MDM: CPT

## 2017-06-05 PROCEDURE — 59025 FETAL NON-STRESS TEST: CPT

## 2017-06-05 PROCEDURE — 59025 FETAL NON-STRESS TEST: CPT | Performed by: OBSTETRICS & GYNECOLOGY

## 2017-06-05 PROCEDURE — 99213 OFFICE O/P EST LOW 20 MIN: CPT | Performed by: OBSTETRICS & GYNECOLOGY

## 2017-06-07 LAB — URINE CULTURE, ROUTINE: NORMAL

## 2017-06-20 ENCOUNTER — ROUTINE PRENATAL (OUTPATIENT)
Dept: OBGYN | Age: 27
End: 2017-06-20

## 2017-06-20 VITALS
BODY MASS INDEX: 31.47 KG/M2 | WEIGHT: 195 LBS | DIASTOLIC BLOOD PRESSURE: 64 MMHG | HEART RATE: 104 BPM | SYSTOLIC BLOOD PRESSURE: 122 MMHG

## 2017-06-20 DIAGNOSIS — Z34.83 ENCOUNTER FOR SUPERVISION OF OTHER NORMAL PREGNANCY, THIRD TRIMESTER: ICD-10-CM

## 2017-06-20 DIAGNOSIS — Z34.83 ENCOUNTER FOR SUPERVISION OF OTHER NORMAL PREGNANCY, THIRD TRIMESTER: Primary | ICD-10-CM

## 2017-06-20 LAB
AMORPHOUS: ABNORMAL
BACTERIA: ABNORMAL /HPF
BILIRUBIN URINE: NEGATIVE
BILIRUBIN, POC: ABNORMAL
BLOOD URINE, POC: ABNORMAL
BLOOD, URINE: NEGATIVE
CLARITY, POC: ABNORMAL
CLARITY: ABNORMAL
COLOR, POC: YELLOW
COLOR: YELLOW
EPITHELIAL CELLS, UA: ABNORMAL /HPF
GLUCOSE URINE, POC: ABNORMAL
GLUCOSE URINE: NEGATIVE MG/DL
KETONES, POC: ABNORMAL
KETONES, URINE: NEGATIVE MG/DL
LEUKOCYTE EST, POC: ABNORMAL
LEUKOCYTE ESTERASE, URINE: ABNORMAL
NITRITE, POC: ABNORMAL
NITRITE, URINE: NEGATIVE
PH UA: 7.5 (ref 5–9)
PH, POC: 7
PROTEIN UA: 30 MG/DL
PROTEIN, POC: ABNORMAL
RBC UA: ABNORMAL /HPF (ref 0–2)
SPECIFIC GRAVITY UA: 1.02 (ref 1–1.03)
SPECIFIC GRAVITY, POC: 1.01
UROBILINOGEN, POC: ABNORMAL
UROBILINOGEN, URINE: 0.2 E.U./DL
WBC UA: ABNORMAL /HPF (ref 0–5)

## 2017-06-20 PROCEDURE — 99213 OFFICE O/P EST LOW 20 MIN: CPT | Performed by: OBSTETRICS & GYNECOLOGY

## 2017-06-22 LAB — URINE CULTURE, ROUTINE: NORMAL

## 2017-06-23 RX ORDER — NITROFURANTOIN 25; 75 MG/1; MG/1
100 CAPSULE ORAL 2 TIMES DAILY
Qty: 20 CAPSULE | Refills: 0 | Status: SHIPPED | OUTPATIENT
Start: 2017-06-23 | End: 2017-07-03

## 2017-07-07 ENCOUNTER — ROUTINE PRENATAL (OUTPATIENT)
Dept: OBGYN | Age: 27
End: 2017-07-07

## 2017-07-07 VITALS
HEART RATE: 104 BPM | WEIGHT: 199.8 LBS | BODY MASS INDEX: 32.25 KG/M2 | DIASTOLIC BLOOD PRESSURE: 70 MMHG | SYSTOLIC BLOOD PRESSURE: 108 MMHG

## 2017-07-07 DIAGNOSIS — Z34.83 ENCOUNTER FOR SUPERVISION OF OTHER NORMAL PREGNANCY, THIRD TRIMESTER: Primary | ICD-10-CM

## 2017-07-07 DIAGNOSIS — Z34.83 ENCOUNTER FOR SUPERVISION OF OTHER NORMAL PREGNANCY, THIRD TRIMESTER: ICD-10-CM

## 2017-07-07 LAB
BACTERIA: ABNORMAL /HPF
BILIRUBIN URINE: NEGATIVE
BILIRUBIN, POC: ABNORMAL
BLOOD URINE, POC: ABNORMAL
BLOOD, URINE: NEGATIVE
CLARITY, POC: CLEAR
CLARITY: ABNORMAL
COLOR, POC: YELLOW
COLOR: YELLOW
EPITHELIAL CELLS, UA: ABNORMAL /HPF
GLUCOSE URINE, POC: ABNORMAL
GLUCOSE URINE: NEGATIVE MG/DL
KETONES, POC: ABNORMAL
KETONES, URINE: NEGATIVE MG/DL
LEUKOCYTE EST, POC: ABNORMAL
LEUKOCYTE ESTERASE, URINE: ABNORMAL
NITRITE, POC: ABNORMAL
NITRITE, URINE: NEGATIVE
PH UA: 8 (ref 5–9)
PH, POC: 7
PROTEIN UA: ABNORMAL MG/DL
PROTEIN, POC: ABNORMAL
RBC UA: ABNORMAL /HPF (ref 0–2)
SPECIFIC GRAVITY UA: 1.02 (ref 1–1.03)
SPECIFIC GRAVITY, POC: 1.02
UROBILINOGEN, POC: ABNORMAL
UROBILINOGEN, URINE: 0.2 E.U./DL
WBC UA: ABNORMAL /HPF (ref 0–5)

## 2017-07-07 PROCEDURE — 99213 OFFICE O/P EST LOW 20 MIN: CPT | Performed by: OBSTETRICS & GYNECOLOGY

## 2017-07-07 PROCEDURE — G8417 CALC BMI ABV UP PARAM F/U: HCPCS | Performed by: OBSTETRICS & GYNECOLOGY

## 2017-07-07 PROCEDURE — G8427 DOCREV CUR MEDS BY ELIG CLIN: HCPCS | Performed by: OBSTETRICS & GYNECOLOGY

## 2017-07-07 PROCEDURE — 1036F TOBACCO NON-USER: CPT | Performed by: OBSTETRICS & GYNECOLOGY

## 2017-07-09 LAB — URINE CULTURE, ROUTINE: NORMAL

## 2017-07-19 ENCOUNTER — ROUTINE PRENATAL (OUTPATIENT)
Dept: OBGYN | Age: 27
End: 2017-07-19

## 2017-07-19 VITALS
SYSTOLIC BLOOD PRESSURE: 115 MMHG | BODY MASS INDEX: 32.77 KG/M2 | DIASTOLIC BLOOD PRESSURE: 79 MMHG | WEIGHT: 203 LBS | HEART RATE: 88 BPM

## 2017-07-19 DIAGNOSIS — Z34.83 ENCOUNTER FOR SUPERVISION OF OTHER NORMAL PREGNANCY IN THIRD TRIMESTER: ICD-10-CM

## 2017-07-19 DIAGNOSIS — Z34.83 ENCOUNTER FOR SUPERVISION OF OTHER NORMAL PREGNANCY, THIRD TRIMESTER: ICD-10-CM

## 2017-07-19 DIAGNOSIS — Z34.83 ENCOUNTER FOR SUPERVISION OF OTHER NORMAL PREGNANCY IN THIRD TRIMESTER: Primary | ICD-10-CM

## 2017-07-19 LAB
BACTERIA: ABNORMAL /HPF
BILIRUBIN URINE: NEGATIVE
BILIRUBIN, POC: ABNORMAL
BLOOD URINE, POC: ABNORMAL
BLOOD, URINE: NEGATIVE
CLARITY, POC: ABNORMAL
CLARITY: ABNORMAL
COLOR, POC: YELLOW
COLOR: YELLOW
CRYSTALS, UA: ABNORMAL
EPITHELIAL CELLS, UA: ABNORMAL /HPF
GLUCOSE URINE, POC: ABNORMAL
GLUCOSE URINE: NEGATIVE MG/DL
KETONES, POC: ABNORMAL
KETONES, URINE: NEGATIVE MG/DL
LEUKOCYTE EST, POC: ABNORMAL
LEUKOCYTE ESTERASE, URINE: ABNORMAL
NITRITE, POC: ABNORMAL
NITRITE, URINE: NEGATIVE
PH UA: 6.5 (ref 5–9)
PH, POC: 6
PROTEIN UA: ABNORMAL MG/DL
PROTEIN, POC: ABNORMAL
RBC UA: ABNORMAL /HPF (ref 0–2)
RENAL EPITHELIAL, UA: ABNORMAL /HPF
SPECIFIC GRAVITY UA: 1.02 (ref 1–1.03)
SPECIFIC GRAVITY, POC: 1.03
UROBILINOGEN, POC: ABNORMAL
UROBILINOGEN, URINE: 0.2 E.U./DL
WBC UA: ABNORMAL /HPF (ref 0–5)

## 2017-07-19 PROCEDURE — 1036F TOBACCO NON-USER: CPT | Performed by: OBSTETRICS & GYNECOLOGY

## 2017-07-19 PROCEDURE — 99213 OFFICE O/P EST LOW 20 MIN: CPT | Performed by: OBSTETRICS & GYNECOLOGY

## 2017-07-19 PROCEDURE — G8427 DOCREV CUR MEDS BY ELIG CLIN: HCPCS | Performed by: OBSTETRICS & GYNECOLOGY

## 2017-07-19 PROCEDURE — G8417 CALC BMI ABV UP PARAM F/U: HCPCS | Performed by: OBSTETRICS & GYNECOLOGY

## 2017-07-19 RX ORDER — NITROFURANTOIN 25; 75 MG/1; MG/1
100 CAPSULE ORAL 2 TIMES DAILY
Qty: 20 CAPSULE | Refills: 0 | Status: SHIPPED | OUTPATIENT
Start: 2017-07-19 | End: 2017-07-29

## 2017-07-21 LAB — URINE CULTURE, ROUTINE: NORMAL

## 2017-07-26 ENCOUNTER — ROUTINE PRENATAL (OUTPATIENT)
Dept: OBGYN | Age: 27
End: 2017-07-26

## 2017-07-26 VITALS
DIASTOLIC BLOOD PRESSURE: 84 MMHG | WEIGHT: 206 LBS | SYSTOLIC BLOOD PRESSURE: 132 MMHG | HEART RATE: 116 BPM | BODY MASS INDEX: 33.25 KG/M2

## 2017-07-26 DIAGNOSIS — Z34.83 ENCOUNTER FOR SUPERVISION OF OTHER NORMAL PREGNANCY, THIRD TRIMESTER: Primary | ICD-10-CM

## 2017-07-26 DIAGNOSIS — O47.00 PRETERM UTERINE CONTRACTIONS: ICD-10-CM

## 2017-07-26 LAB
BACTERIA: ABNORMAL /HPF
BILIRUBIN URINE: NEGATIVE
BLOOD, URINE: NEGATIVE
CLARITY: ABNORMAL
COLOR: ABNORMAL
CRYSTALS, UA: ABNORMAL
EPITHELIAL CELLS, UA: ABNORMAL /HPF
GLUCOSE URINE: NEGATIVE MG/DL
KETONES, URINE: NEGATIVE MG/DL
LEUKOCYTE ESTERASE, URINE: ABNORMAL
NITRITE, URINE: NEGATIVE
PH UA: 6.5 (ref 5–9)
PROTEIN UA: ABNORMAL MG/DL
RBC UA: ABNORMAL /HPF (ref 0–2)
SPECIFIC GRAVITY UA: 1.02 (ref 1–1.03)
UROBILINOGEN, URINE: 0.2 E.U./DL
WBC UA: ABNORMAL /HPF (ref 0–5)

## 2017-07-26 PROCEDURE — 99213 OFFICE O/P EST LOW 20 MIN: CPT | Performed by: OBSTETRICS & GYNECOLOGY

## 2017-07-28 LAB — URINE CULTURE, ROUTINE: NORMAL

## 2017-08-01 ENCOUNTER — ROUTINE PRENATAL (OUTPATIENT)
Dept: OBGYN | Age: 27
End: 2017-08-01

## 2017-08-01 VITALS
WEIGHT: 208.8 LBS | DIASTOLIC BLOOD PRESSURE: 82 MMHG | SYSTOLIC BLOOD PRESSURE: 136 MMHG | HEART RATE: 116 BPM | BODY MASS INDEX: 33.7 KG/M2

## 2017-08-01 DIAGNOSIS — O47.00 PRETERM UTERINE CONTRACTIONS: ICD-10-CM

## 2017-08-01 DIAGNOSIS — Z34.83 ENCOUNTER FOR SUPERVISION OF OTHER NORMAL PREGNANCY, THIRD TRIMESTER: Primary | ICD-10-CM

## 2017-08-01 LAB
BILIRUBIN, POC: NORMAL
BLOOD URINE, POC: NORMAL
CLARITY, POC: CLEAR
COLOR, POC: YELLOW
GLUCOSE URINE, POC: NORMAL
KETONES, POC: NORMAL
LEUKOCYTE EST, POC: NORMAL
NITRITE, POC: NORMAL
PH, POC: 6
PROTEIN, POC: NORMAL
SPECIFIC GRAVITY, POC: 1.02
UROBILINOGEN, POC: NORMAL

## 2017-08-01 PROCEDURE — 99213 OFFICE O/P EST LOW 20 MIN: CPT | Performed by: OBSTETRICS & GYNECOLOGY

## 2017-08-08 ENCOUNTER — ROUTINE PRENATAL (OUTPATIENT)
Dept: OBGYN | Age: 27
End: 2017-08-08

## 2017-08-08 VITALS
SYSTOLIC BLOOD PRESSURE: 120 MMHG | WEIGHT: 210 LBS | BODY MASS INDEX: 33.89 KG/M2 | DIASTOLIC BLOOD PRESSURE: 84 MMHG | HEART RATE: 120 BPM

## 2017-08-08 DIAGNOSIS — Z34.83 ENCOUNTER FOR SUPERVISION OF OTHER NORMAL PREGNANCY, THIRD TRIMESTER: Primary | ICD-10-CM

## 2017-08-08 LAB
BILIRUBIN, POC: NORMAL
BLOOD URINE, POC: NORMAL
CLARITY, POC: CLEAR
COLOR, POC: YELLOW
GLUCOSE URINE, POC: NORMAL
KETONES, POC: NORMAL
LEUKOCYTE EST, POC: NORMAL
NITRITE, POC: NORMAL
PH, POC: 6.5
PROTEIN, POC: NORMAL
SPECIFIC GRAVITY, POC: 1.02
UROBILINOGEN, POC: NORMAL

## 2017-08-08 PROCEDURE — 99213 OFFICE O/P EST LOW 20 MIN: CPT | Performed by: OBSTETRICS & GYNECOLOGY

## 2017-08-11 ENCOUNTER — HOSPITAL ENCOUNTER (OUTPATIENT)
Age: 27
Discharge: HOME | End: 2017-08-12
Attending: OBSTETRICS & GYNECOLOGY | Admitting: OBSTETRICS & GYNECOLOGY
Payer: MEDICARE

## 2017-08-11 LAB
BACTERIA: ABNORMAL /HPF
BILIRUBIN URINE: NEGATIVE
BLOOD, URINE: NEGATIVE
CLARITY: ABNORMAL
COLOR: YELLOW
EPITHELIAL CELLS, UA: ABNORMAL /HPF
GLUCOSE URINE: NEGATIVE MG/DL
KETONES, URINE: NEGATIVE MG/DL
LEUKOCYTE ESTERASE, URINE: ABNORMAL
NITRITE, URINE: NEGATIVE
PH UA: 7.5 (ref 5–9)
PROTEIN UA: NEGATIVE MG/DL
RBC UA: ABNORMAL /HPF (ref 0–2)
SPECIFIC GRAVITY UA: 1.01 (ref 1–1.03)
UROBILINOGEN, URINE: 0.2 E.U./DL
WBC UA: ABNORMAL /HPF (ref 0–5)

## 2017-08-11 PROCEDURE — 81001 URINALYSIS AUTO W/SCOPE: CPT

## 2017-08-11 PROCEDURE — 6370000000 HC RX 637 (ALT 250 FOR IP): Performed by: OBSTETRICS & GYNECOLOGY

## 2017-08-11 RX ORDER — ONDANSETRON 2 MG/ML
4 INJECTION INTRAMUSCULAR; INTRAVENOUS EVERY 6 HOURS PRN
Status: DISCONTINUED | OUTPATIENT
Start: 2017-08-11 | End: 2017-08-12 | Stop reason: HOSPADM

## 2017-08-11 RX ORDER — ACETAMINOPHEN 325 MG/1
650 TABLET ORAL EVERY 4 HOURS PRN
Status: DISCONTINUED | OUTPATIENT
Start: 2017-08-11 | End: 2017-08-12 | Stop reason: HOSPADM

## 2017-08-11 RX ORDER — SODIUM CHLORIDE 0.9 % (FLUSH) 0.9 %
10 SYRINGE (ML) INJECTION PRN
Status: DISCONTINUED | OUTPATIENT
Start: 2017-08-11 | End: 2017-08-12 | Stop reason: HOSPADM

## 2017-08-11 RX ORDER — SODIUM CHLORIDE 0.9 % (FLUSH) 0.9 %
10 SYRINGE (ML) INJECTION EVERY 12 HOURS SCHEDULED
Status: DISCONTINUED | OUTPATIENT
Start: 2017-08-11 | End: 2017-08-12 | Stop reason: HOSPADM

## 2017-08-11 RX ADMIN — ACETAMINOPHEN 650 MG: 325 TABLET ORAL at 22:56

## 2017-08-11 ASSESSMENT — PAIN DESCRIPTION - DESCRIPTORS
DESCRIPTORS: ACHING
DESCRIPTORS: ACHING

## 2017-08-11 ASSESSMENT — PAIN DESCRIPTION - PROGRESSION: CLINICAL_PROGRESSION: GRADUALLY IMPROVING

## 2017-08-11 ASSESSMENT — PAIN DESCRIPTION - PAIN TYPE
TYPE: ACUTE PAIN
TYPE: ACUTE PAIN

## 2017-08-11 ASSESSMENT — PAIN DESCRIPTION - ONSET: ONSET: ON-GOING

## 2017-08-11 ASSESSMENT — PAIN DESCRIPTION - LOCATION
LOCATION: HEAD
LOCATION: HEAD

## 2017-08-11 ASSESSMENT — PAIN SCALES - GENERAL
PAINLEVEL_OUTOF10: 6
PAINLEVEL_OUTOF10: 3

## 2017-08-11 NOTE — IP AVS SNAPSHOT
? Review your current list of  medications, immunization, and allergies. ? Review your future test results online . ? Review your discharge instructions provided by your caregivers at discharge    Certain functionality such as prescription refills, scheduling appointments or sending messages to your provider are not activated if your provider does not use [facility] in his/her office    For questions regarding your MyChart account call [phone]. If you have a clinical question, please call your doctor's office. The information on all pages of the After Visit Summary has been reviewed with me, the patient and/or responsible adult, by my health care provider(s). I had the opportunity to ask questions regarding this information. I understand I should dispose of my armband safely at home to protect my health information. A complete copy of the After Visit Summary has been given to me, the patient and/or responsible adult. Patient Signature/Responsible Adult:____________________    Clinician Signature:_____________________    Date:_____________________    Time:_____________________        More Information           Kevan Litten Contractions: Care Instructions  Your Care Instructions  Jonathan Cramer contractions prepare your uterus for labor. Think of them as a \"warm-up\" exercise that your body does. You may begin to feel them between the 28th and 30th weeks of your pregnancy. But they start as early as the 20th week. Posey Cramer contractions usually occur more often during the ninth month. They may go away when you are active and return when you rest. These contractions are like mild contractions of true labor, but they occur less often. (You feel fewer than 8 in an hour.) They don't cause your cervix to open. It may be hard for you to tell the difference between Kevan Litten contractions and true labor, especially in your first pregnancy. Follow-up care is a key part of your treatment and safety. Be sure to make and go to all appointments, and call your doctor if you are having problems. It's also a good idea to know your test results and keep a list of the medicines you take. How can you care for yourself at home? · Try a warm bath to help relieve muscle tension and reduce pain. · Change positions every 30 minutes. Take breaks if you must sit for a long time. Get up and walk around. · Drink plenty of water, enough so that your urine is light yellow or clear like water. · Taking short walks may help you feel better. Your doctor needs to check any contractions that are getting stronger or closer together. Where can you learn more? Go to [email or web address]://[email or web address] org and sign in to your [employer] account. Enter Q533 in the [place] box to learn more about \"[place] Cramer Contractions: Care Instructions. \"     If you do not have an account, please click on the \"Sign Up Now\" link. Current as of: May 30, 2016  Content Version: 11.2  © 2709-6997 [employer]. Care instructions adapted under license by [facility] [employer] ([facility]). If you have questions about a medical condition or this instruction, always ask your healthcare professional. [address] any warranty or liability for your use of this information.

## 2017-08-12 VITALS
DIASTOLIC BLOOD PRESSURE: 61 MMHG | HEIGHT: 66 IN | SYSTOLIC BLOOD PRESSURE: 111 MMHG | BODY MASS INDEX: 34.42 KG/M2 | RESPIRATION RATE: 18 BRPM | HEART RATE: 94 BPM | WEIGHT: 214.2 LBS | TEMPERATURE: 98.3 F

## 2017-08-12 PROBLEM — O47.03 FALSE LABOR BEFORE 37 COMPLETED WEEKS OF GESTATION IN THIRD TRIMESTER: Status: ACTIVE | Noted: 2017-08-12

## 2017-08-12 LAB
AMPHETAMINE SCREEN, URINE: NORMAL
BARBITURATE SCREEN URINE: NORMAL
BENZODIAZEPINE SCREEN, URINE: NORMAL
CANNABINOID SCREEN URINE: NORMAL
COCAINE METABOLITE SCREEN URINE: NORMAL
Lab: NORMAL
OPIATE SCREEN URINE: NORMAL
PHENCYCLIDINE SCREEN URINE: NORMAL

## 2017-08-12 PROCEDURE — 59025 FETAL NON-STRESS TEST: CPT

## 2017-08-12 PROCEDURE — 6370000000 HC RX 637 (ALT 250 FOR IP): Performed by: OBSTETRICS & GYNECOLOGY

## 2017-08-12 PROCEDURE — 99284 EMERGENCY DEPT VISIT MOD MDM: CPT

## 2017-08-12 PROCEDURE — 99218 PR INITIAL OBSERVATION CARE/DAY 30 MINUTES: CPT | Performed by: OBSTETRICS & GYNECOLOGY

## 2017-08-12 PROCEDURE — 80307 DRUG TEST PRSMV CHEM ANLYZR: CPT

## 2017-08-12 RX ADMIN — ACETAMINOPHEN 650 MG: 325 TABLET ORAL at 07:32

## 2017-08-12 ASSESSMENT — PAIN SCALES - GENERAL: PAINLEVEL_OUTOF10: 5

## 2017-08-12 NOTE — PROGRESS NOTES
Department of Obstetrics and Gynecology  Labor and Delivery  OB hospitalist Triage Note      SUBJECTIVE:  Feeling contractions for the last 2-3 days    OBJECTIVEPt came to triage with C/O feeling contractions for the last 2-3 days. No leakage of fluid. Pt was checked by RN and was reported to be 3 cm and 70% effaced. Vertex presentation. Pt was admitted under observation. Pt stopped having contractions and she slept. In the morning at 6 am, I checked the pt. She is having regular contractions now again. On pelvic exam , she is 2 cm, soft cervix , 60% effaced, vertex but -4 , floating  There has been no progress in dilatation of cervix. Pt is GBS positive    Vitals:  /61  Pulse 85  Temp 98.4 °F (36.9 °C) (Oral)   Resp 18  Ht 5' 6\" (1.676 m)  Wt 214 lb 3.2 oz (97.2 kg)  LMP 11/17/2016 (Approximate)  BMI 34.57 kg/m2    CONSTITUTIONAL:  awake, alert, cooperative, no apparent distress, and appears stated age    Cervix:             Dilation:  2 cm         Effacement:  60%         Station:  -4 cm         Consistency:  soft         Position:  mid    Fetal Position:  Cephalic    Membranes:  Intact    Fetal heart rate:         Baseline Heart Rate:  140-150        Accelerations:  present         Variability:  moderate    Contraction frequency: 2 minutes    Pelvic Ultrasound:  Not done                      DATA:  CBC:   Lab Results   Component Value Date    WBC 11.5 05/15/2017    RBC 4.05 05/15/2017    HGB 12.3 05/15/2017    HCT 38.1 05/15/2017    MCV 94.1 05/15/2017    RDW 12.7 05/15/2017     05/15/2017       ASSESSMENT & PLAN:      Patient Active Hospital Problem List:   False labor before 37 completed weeks of gestation in third trimester (8/12/2017)    Assessment: 38 wks gestation in False labor? Plan: follow up  Repeat pelvic exam in 2  Hrs for progress of labor  Pt will be signed out to Dr Emanuel Deleon at [address] [place] [address] today.

## 2017-08-15 ENCOUNTER — PREP FOR PROCEDURE (OUTPATIENT)
Dept: OBGYN | Age: 27
End: 2017-08-15

## 2017-08-15 ENCOUNTER — ROUTINE PRENATAL (OUTPATIENT)
Dept: OBGYN | Age: 27
End: 2017-08-15

## 2017-08-15 VITALS — HEART RATE: 116 BPM | SYSTOLIC BLOOD PRESSURE: 126 MMHG | DIASTOLIC BLOOD PRESSURE: 84 MMHG

## 2017-08-15 DIAGNOSIS — O47.00 PRETERM UTERINE CONTRACTIONS: ICD-10-CM

## 2017-08-15 DIAGNOSIS — Z34.83 ENCOUNTER FOR SUPERVISION OF OTHER NORMAL PREGNANCY, THIRD TRIMESTER: Primary | ICD-10-CM

## 2017-08-15 LAB
BILIRUBIN, POC: NORMAL
BLOOD URINE, POC: NORMAL
CLARITY, POC: CLEAR
COLOR, POC: YELLOW
GLUCOSE URINE, POC: NORMAL
KETONES, POC: NORMAL
LEUKOCYTE EST, POC: NORMAL
NITRITE, POC: NORMAL
PH, POC: 7
PROTEIN, POC: NORMAL
SPECIFIC GRAVITY, POC: 1.02
UROBILINOGEN, POC: NORMAL

## 2017-08-15 PROCEDURE — 99213 OFFICE O/P EST LOW 20 MIN: CPT | Performed by: OBSTETRICS & GYNECOLOGY

## 2017-08-15 RX ORDER — SODIUM CHLORIDE 0.9 % (FLUSH) 0.9 %
10 SYRINGE (ML) INJECTION EVERY 12 HOURS SCHEDULED
Status: CANCELLED | OUTPATIENT
Start: 2017-08-15

## 2017-08-15 RX ORDER — SODIUM CHLORIDE 0.9 % (FLUSH) 0.9 %
10 SYRINGE (ML) INJECTION PRN
Status: CANCELLED | OUTPATIENT
Start: 2017-08-15

## 2017-08-15 RX ORDER — SODIUM CHLORIDE, SODIUM LACTATE, POTASSIUM CHLORIDE, CALCIUM CHLORIDE 600; 310; 30; 20 MG/100ML; MG/100ML; MG/100ML; MG/100ML
INJECTION, SOLUTION INTRAVENOUS CONTINUOUS
Status: CANCELLED | OUTPATIENT
Start: 2017-08-15

## 2017-08-15 RX ORDER — NALBUPHINE HCL 10 MG/ML
10 AMPUL (ML) INJECTION
Status: CANCELLED | OUTPATIENT
Start: 2017-08-15

## 2017-08-15 RX ORDER — ZOLPIDEM TARTRATE 5 MG/1
5 TABLET ORAL NIGHTLY PRN
Status: CANCELLED | OUTPATIENT
Start: 2017-08-15

## 2017-08-15 RX ORDER — DOCUSATE SODIUM 100 MG/1
100 CAPSULE, LIQUID FILLED ORAL 2 TIMES DAILY
Status: CANCELLED | OUTPATIENT
Start: 2017-08-15

## 2017-08-15 RX ORDER — ONDANSETRON 2 MG/ML
4 INJECTION INTRAMUSCULAR; INTRAVENOUS EVERY 6 HOURS PRN
Status: CANCELLED | OUTPATIENT
Start: 2017-08-15

## 2017-08-17 ENCOUNTER — HOSPITAL ENCOUNTER (OUTPATIENT)
Age: 27
Discharge: HOME | End: 2017-08-17
Attending: OBSTETRICS & GYNECOLOGY | Admitting: OBSTETRICS & GYNECOLOGY
Payer: MEDICARE

## 2017-08-17 VITALS
HEART RATE: 82 BPM | SYSTOLIC BLOOD PRESSURE: 127 MMHG | DIASTOLIC BLOOD PRESSURE: 69 MMHG | BODY MASS INDEX: 34.54 KG/M2 | RESPIRATION RATE: 18 BRPM | OXYGEN SATURATION: 97 % | WEIGHT: 214 LBS | TEMPERATURE: 97.6 F

## 2017-08-17 PROBLEM — O47.9 LABOR, FALSE: Status: ACTIVE | Noted: 2017-08-17

## 2017-08-17 LAB
ALBUMIN SERPL-MCNC: 3 G/DL (ref 3.9–4.9)
ALP BLD-CCNC: 116 U/L (ref 40–130)
ALT SERPL-CCNC: 9 U/L (ref 0–33)
AMPHETAMINE SCREEN, URINE: NORMAL
ANION GAP SERPL CALCULATED.3IONS-SCNC: 13 MEQ/L (ref 7–13)
AST SERPL-CCNC: 10 U/L (ref 0–35)
BACTERIA: ABNORMAL /HPF
BARBITURATE SCREEN URINE: NORMAL
BASOPHILS ABSOLUTE: 0.1 K/UL (ref 0–0.2)
BASOPHILS RELATIVE PERCENT: 0.8 %
BENZODIAZEPINE SCREEN, URINE: NORMAL
BILIRUB SERPL-MCNC: 0.1 MG/DL (ref 0–1.2)
BILIRUBIN URINE: NEGATIVE
BLOOD, URINE: NEGATIVE
BUN BLDV-MCNC: 7 MG/DL (ref 6–20)
CALCIUM SERPL-MCNC: 8.4 MG/DL (ref 8.6–10.2)
CANNABINOID SCREEN URINE: NORMAL
CHLORIDE BLD-SCNC: 103 MEQ/L (ref 98–107)
CLARITY: ABNORMAL
CO2: 20 MEQ/L (ref 22–29)
COCAINE METABOLITE SCREEN URINE: NORMAL
COLOR: YELLOW
CREAT SERPL-MCNC: 0.58 MG/DL (ref 0.5–0.9)
EOSINOPHILS ABSOLUTE: 0.1 K/UL (ref 0–0.7)
EOSINOPHILS RELATIVE PERCENT: 0.9 %
EPITHELIAL CELLS, UA: ABNORMAL /HPF
GFR AFRICAN AMERICAN: >60
GFR NON-AFRICAN AMERICAN: >60
GLOBULIN: 2.6 G/DL (ref 2.3–3.5)
GLUCOSE BLD-MCNC: 89 MG/DL (ref 74–109)
GLUCOSE URINE: NEGATIVE MG/DL
HCT VFR BLD CALC: 33.2 % (ref 37–47)
HEMOGLOBIN: 11.1 G/DL (ref 12–16)
KETONES, URINE: NEGATIVE MG/DL
LEUKOCYTE ESTERASE, URINE: ABNORMAL
LYMPHOCYTES ABSOLUTE: 2.5 K/UL (ref 1–4.8)
LYMPHOCYTES RELATIVE PERCENT: 29.8 %
Lab: NORMAL
MCH RBC QN AUTO: 30 PG (ref 27–31.3)
MCHC RBC AUTO-ENTMCNC: 33.4 % (ref 33–37)
MCV RBC AUTO: 89.8 FL (ref 82–100)
MONOCYTES ABSOLUTE: 0.5 K/UL (ref 0.2–0.8)
MONOCYTES RELATIVE PERCENT: 5.5 %
NEUTROPHILS ABSOLUTE: 5.3 K/UL (ref 1.4–6.5)
NEUTROPHILS RELATIVE PERCENT: 63 %
NITRITE, URINE: NEGATIVE
OPIATE SCREEN URINE: NORMAL
PDW BLD-RTO: 13.3 % (ref 11.5–14.5)
PH UA: 7.5 (ref 5–9)
PHENCYCLIDINE SCREEN URINE: NORMAL
PLATELET # BLD: 151 K/UL (ref 130–400)
POTASSIUM SERPL-SCNC: 4.2 MEQ/L (ref 3.5–5.1)
PROTEIN UA: NEGATIVE MG/DL
RBC # BLD: 3.7 M/UL (ref 4.2–5.4)
RBC UA: ABNORMAL /HPF (ref 0–2)
SODIUM BLD-SCNC: 136 MEQ/L (ref 132–144)
SPECIFIC GRAVITY UA: 1.01 (ref 1–1.03)
TOTAL PROTEIN: 5.6 G/DL (ref 6.4–8.1)
URIC ACID, SERUM: 4.2 MG/DL (ref 2.4–5.7)
UROBILINOGEN, URINE: 0.2 E.U./DL
WBC # BLD: 8.4 K/UL (ref 4.8–10.8)
WBC UA: ABNORMAL /HPF (ref 0–5)

## 2017-08-17 PROCEDURE — 84550 ASSAY OF BLOOD/URIC ACID: CPT

## 2017-08-17 PROCEDURE — 85025 COMPLETE CBC W/AUTO DIFF WBC: CPT

## 2017-08-17 PROCEDURE — 80053 COMPREHEN METABOLIC PANEL: CPT

## 2017-08-17 PROCEDURE — 99283 EMERGENCY DEPT VISIT LOW MDM: CPT

## 2017-08-17 PROCEDURE — 59025 FETAL NON-STRESS TEST: CPT

## 2017-08-17 PROCEDURE — 36415 COLL VENOUS BLD VENIPUNCTURE: CPT

## 2017-08-17 PROCEDURE — 99283 EMERGENCY DEPT VISIT LOW MDM: CPT | Performed by: OBSTETRICS & GYNECOLOGY

## 2017-08-17 PROCEDURE — 81001 URINALYSIS AUTO W/SCOPE: CPT

## 2017-08-17 PROCEDURE — 80307 DRUG TEST PRSMV CHEM ANLYZR: CPT

## 2017-08-17 RX ORDER — ACETAMINOPHEN 325 MG/1
650 TABLET ORAL EVERY 4 HOURS PRN
Status: DISCONTINUED | OUTPATIENT
Start: 2017-08-17 | End: 2017-08-17 | Stop reason: HOSPADM

## 2017-08-18 ENCOUNTER — HOSPITAL ENCOUNTER (INPATIENT)
Age: 27
LOS: 4 days | Discharge: HOME | End: 2017-08-22
Attending: OBSTETRICS & GYNECOLOGY | Admitting: OBSTETRICS & GYNECOLOGY
Payer: MEDICARE

## 2017-08-18 LAB
ABO/RH: NORMAL
ALBUMIN SERPL-MCNC: 3.2 G/DL (ref 3.9–4.9)
ALP BLD-CCNC: 126 U/L (ref 40–130)
ALT SERPL-CCNC: 9 U/L (ref 0–33)
AMPHETAMINE SCREEN, URINE: NORMAL
ANION GAP SERPL CALCULATED.3IONS-SCNC: 16 MEQ/L (ref 7–13)
ANTIBODY SCREEN: NORMAL
AST SERPL-CCNC: 11 U/L (ref 0–35)
BACTERIA: ABNORMAL /HPF
BARBITURATE SCREEN URINE: NORMAL
BASOPHILS ABSOLUTE: 0.1 K/UL (ref 0–0.2)
BASOPHILS RELATIVE PERCENT: 0.5 %
BENZODIAZEPINE SCREEN, URINE: NORMAL
BILIRUB SERPL-MCNC: 0.1 MG/DL (ref 0–1.2)
BILIRUBIN URINE: NEGATIVE
BLOOD, URINE: NEGATIVE
BUN BLDV-MCNC: 8 MG/DL (ref 6–20)
CALCIUM SERPL-MCNC: 9.1 MG/DL (ref 8.6–10.2)
CANNABINOID SCREEN URINE: NORMAL
CHLORIDE BLD-SCNC: 103 MEQ/L (ref 98–107)
CLARITY: ABNORMAL
CO2: 20 MEQ/L (ref 22–29)
COCAINE METABOLITE SCREEN URINE: NORMAL
COLOR: YELLOW
CREAT SERPL-MCNC: 0.6 MG/DL (ref 0.5–0.9)
EOSINOPHILS ABSOLUTE: 0.1 K/UL (ref 0–0.7)
EOSINOPHILS RELATIVE PERCENT: 0.7 %
EPITHELIAL CELLS, UA: ABNORMAL /HPF
GFR AFRICAN AMERICAN: >60
GFR NON-AFRICAN AMERICAN: >60
GLOBULIN: 2.7 G/DL (ref 2.3–3.5)
GLUCOSE BLD-MCNC: 109 MG/DL (ref 74–109)
GLUCOSE URINE: NEGATIVE MG/DL
HCT VFR BLD CALC: 34.3 % (ref 37–47)
HEMOGLOBIN: 11.2 G/DL (ref 12–16)
HEPATITIS B SURFACE ANTIGEN INTERPRETATION: NORMAL
KETONES, URINE: NEGATIVE MG/DL
LEUKOCYTE ESTERASE, URINE: ABNORMAL
LYMPHOCYTES ABSOLUTE: 2.3 K/UL (ref 1–4.8)
LYMPHOCYTES RELATIVE PERCENT: 24.6 %
Lab: NORMAL
MCH RBC QN AUTO: 29.7 PG (ref 27–31.3)
MCHC RBC AUTO-ENTMCNC: 32.8 % (ref 33–37)
MCV RBC AUTO: 90.7 FL (ref 82–100)
MONOCYTES ABSOLUTE: 0.5 K/UL (ref 0.2–0.8)
MONOCYTES RELATIVE PERCENT: 5.1 %
NEUTROPHILS ABSOLUTE: 6.4 K/UL (ref 1.4–6.5)
NEUTROPHILS RELATIVE PERCENT: 69.1 %
NITRITE, URINE: NEGATIVE
OPIATE SCREEN URINE: NORMAL
PDW BLD-RTO: 13.3 % (ref 11.5–14.5)
PH UA: 7 (ref 5–9)
PHENCYCLIDINE SCREEN URINE: NORMAL
PLATELET # BLD: 163 K/UL (ref 130–400)
POTASSIUM SERPL-SCNC: 4 MEQ/L (ref 3.5–5.1)
PROTEIN UA: NEGATIVE MG/DL
RBC # BLD: 3.78 M/UL (ref 4.2–5.4)
RBC UA: ABNORMAL /HPF (ref 0–2)
SODIUM BLD-SCNC: 139 MEQ/L (ref 132–144)
SPECIFIC GRAVITY UA: 1.01 (ref 1–1.03)
TOTAL PROTEIN: 5.9 G/DL (ref 6.4–8.1)
UROBILINOGEN, URINE: 0.2 E.U./DL
WBC # BLD: 9.2 K/UL (ref 4.8–10.8)
WBC UA: ABNORMAL /HPF (ref 0–5)

## 2017-08-18 PROCEDURE — 86901 BLOOD TYPING SEROLOGIC RH(D): CPT

## 2017-08-18 PROCEDURE — 2580000003 HC RX 258: Performed by: OBSTETRICS & GYNECOLOGY

## 2017-08-18 PROCEDURE — 87340 HEPATITIS B SURFACE AG IA: CPT

## 2017-08-18 PROCEDURE — 85025 COMPLETE CBC W/AUTO DIFF WBC: CPT

## 2017-08-18 PROCEDURE — 6370000000 HC RX 637 (ALT 250 FOR IP): Performed by: OBSTETRICS & GYNECOLOGY

## 2017-08-18 PROCEDURE — 80307 DRUG TEST PRSMV CHEM ANLYZR: CPT

## 2017-08-18 PROCEDURE — 36415 COLL VENOUS BLD VENIPUNCTURE: CPT

## 2017-08-18 PROCEDURE — 80053 COMPREHEN METABOLIC PANEL: CPT

## 2017-08-18 PROCEDURE — 86900 BLOOD TYPING SEROLOGIC ABO: CPT

## 2017-08-18 PROCEDURE — 86850 RBC ANTIBODY SCREEN: CPT

## 2017-08-18 PROCEDURE — 86592 SYPHILIS TEST NON-TREP QUAL: CPT

## 2017-08-18 PROCEDURE — 4A1HXCZ MONITORING OF PRODUCTS OF CONCEPTION, CARDIAC RATE, EXTERNAL APPROACH: ICD-10-PCS | Performed by: OBSTETRICS & GYNECOLOGY

## 2017-08-18 PROCEDURE — 81001 URINALYSIS AUTO W/SCOPE: CPT

## 2017-08-18 RX ORDER — ONDANSETRON 2 MG/ML
4 INJECTION INTRAMUSCULAR; INTRAVENOUS EVERY 6 HOURS PRN
Status: DISCONTINUED | OUTPATIENT
Start: 2017-08-18 | End: 2017-08-19

## 2017-08-18 RX ORDER — NALBUPHINE HCL 10 MG/ML
10 AMPUL (ML) INJECTION
Status: COMPLETED | OUTPATIENT
Start: 2017-08-18 | End: 2017-08-19

## 2017-08-18 RX ORDER — SODIUM CHLORIDE 0.9 % (FLUSH) 0.9 %
10 SYRINGE (ML) INJECTION EVERY 12 HOURS SCHEDULED
Status: DISCONTINUED | OUTPATIENT
Start: 2017-08-18 | End: 2017-08-19

## 2017-08-18 RX ORDER — ZOLPIDEM TARTRATE 5 MG/1
5 TABLET ORAL NIGHTLY PRN
Status: DISCONTINUED | OUTPATIENT
Start: 2017-08-18 | End: 2017-08-19

## 2017-08-18 RX ORDER — SODIUM CHLORIDE, SODIUM LACTATE, POTASSIUM CHLORIDE, CALCIUM CHLORIDE 600; 310; 30; 20 MG/100ML; MG/100ML; MG/100ML; MG/100ML
INJECTION, SOLUTION INTRAVENOUS CONTINUOUS
Status: DISCONTINUED | OUTPATIENT
Start: 2017-08-18 | End: 2017-08-19

## 2017-08-18 RX ORDER — SODIUM CHLORIDE 0.9 % (FLUSH) 0.9 %
10 SYRINGE (ML) INJECTION PRN
Status: DISCONTINUED | OUTPATIENT
Start: 2017-08-18 | End: 2017-08-19

## 2017-08-18 RX ADMIN — DINOPROSTONE 10 MG: 10 INSERT VAGINAL at 20:34

## 2017-08-18 RX ADMIN — ZOLPIDEM TARTRATE 5 MG: 5 TABLET ORAL at 23:04

## 2017-08-18 RX ADMIN — Medication 10 ML: at 21:00

## 2017-08-19 ENCOUNTER — APPOINTMENT (OUTPATIENT)
Dept: GENERAL RADIOLOGY | Age: 27
End: 2017-08-19
Payer: MEDICARE

## 2017-08-19 ENCOUNTER — ANESTHESIA EVENT (OUTPATIENT)
Dept: LABOR AND DELIVERY | Age: 27
End: 2017-08-19
Payer: MEDICARE

## 2017-08-19 ENCOUNTER — ANESTHESIA (OUTPATIENT)
Dept: LABOR AND DELIVERY | Age: 27
End: 2017-08-19
Payer: MEDICARE

## 2017-08-19 VITALS
DIASTOLIC BLOOD PRESSURE: 58 MMHG | SYSTOLIC BLOOD PRESSURE: 98 MMHG | RESPIRATION RATE: 16 BRPM | OXYGEN SATURATION: 98 %

## 2017-08-19 PROCEDURE — 7100000000 HC PACU RECOVERY - FIRST 15 MIN: Performed by: OBSTETRICS & GYNECOLOGY

## 2017-08-19 PROCEDURE — 2500000003 HC RX 250 WO HCPCS: Performed by: NURSE ANESTHETIST, CERTIFIED REGISTERED

## 2017-08-19 PROCEDURE — 6360000002 HC RX W HCPCS: Performed by: OBSTETRICS & GYNECOLOGY

## 2017-08-19 PROCEDURE — 2580000003 HC RX 258: Performed by: OBSTETRICS & GYNECOLOGY

## 2017-08-19 PROCEDURE — 3700000025 ANESTHESIA EPIDURAL BLOCK: Performed by: NURSE ANESTHETIST, CERTIFIED REGISTERED

## 2017-08-19 PROCEDURE — 2500000003 HC RX 250 WO HCPCS: Performed by: OBSTETRICS & GYNECOLOGY

## 2017-08-19 PROCEDURE — 74000 XR ABDOMEN LIMITED (KUB): CPT

## 2017-08-19 PROCEDURE — S0028 INJECTION, FAMOTIDINE, 20 MG: HCPCS | Performed by: OBSTETRICS & GYNECOLOGY

## 2017-08-19 PROCEDURE — 3609079900 HC CESAREAN SECTION: Performed by: OBSTETRICS & GYNECOLOGY

## 2017-08-19 PROCEDURE — 3700000001 HC ADD 15 MINUTES (ANESTHESIA): Performed by: OBSTETRICS & GYNECOLOGY

## 2017-08-19 PROCEDURE — 1220000000 HC SEMI PRIVATE OB R&B

## 2017-08-19 PROCEDURE — 7100000001 HC PACU RECOVERY - ADDTL 15 MIN: Performed by: OBSTETRICS & GYNECOLOGY

## 2017-08-19 PROCEDURE — 6360000002 HC RX W HCPCS: Performed by: NURSE ANESTHETIST, CERTIFIED REGISTERED

## 2017-08-19 PROCEDURE — 59514 CESAREAN DELIVERY ONLY: CPT | Performed by: OBSTETRICS & GYNECOLOGY

## 2017-08-19 PROCEDURE — 6370000000 HC RX 637 (ALT 250 FOR IP): Performed by: OBSTETRICS & GYNECOLOGY

## 2017-08-19 PROCEDURE — 3700000000 HC ANESTHESIA ATTENDED CARE: Performed by: OBSTETRICS & GYNECOLOGY

## 2017-08-19 RX ORDER — METHYLERGONOVINE MALEATE 0.2 MG/ML
200 INJECTION INTRAVENOUS PRN
Status: DISCONTINUED | OUTPATIENT
Start: 2017-08-19 | End: 2017-08-22 | Stop reason: HOSPADM

## 2017-08-19 RX ORDER — ONDANSETRON 2 MG/ML
4 INJECTION INTRAMUSCULAR; INTRAVENOUS EVERY 6 HOURS PRN
Status: DISCONTINUED | OUTPATIENT
Start: 2017-08-19 | End: 2017-08-19 | Stop reason: ALTCHOICE

## 2017-08-19 RX ORDER — PRENATAL VIT/IRON FUM/FOLIC AC 27MG-0.8MG
1 TABLET ORAL DAILY
Status: DISCONTINUED | OUTPATIENT
Start: 2017-08-19 | End: 2017-08-22 | Stop reason: HOSPADM

## 2017-08-19 RX ORDER — NALOXONE HYDROCHLORIDE 0.4 MG/ML
0.4 INJECTION, SOLUTION INTRAMUSCULAR; INTRAVENOUS; SUBCUTANEOUS PRN
Status: DISCONTINUED | OUTPATIENT
Start: 2017-08-19 | End: 2017-08-19

## 2017-08-19 RX ORDER — DIPHENHYDRAMINE HYDROCHLORIDE 50 MG/ML
25 INJECTION INTRAMUSCULAR; INTRAVENOUS EVERY 6 HOURS PRN
Status: DISCONTINUED | OUTPATIENT
Start: 2017-08-19 | End: 2017-08-19

## 2017-08-19 RX ORDER — OXYTOCIN 10 [USP'U]/ML
INJECTION, SOLUTION INTRAMUSCULAR; INTRAVENOUS PRN
Status: DISCONTINUED | OUTPATIENT
Start: 2017-08-19 | End: 2017-08-19 | Stop reason: SDUPTHER

## 2017-08-19 RX ORDER — SODIUM CHLORIDE 0.9 % (FLUSH) 0.9 %
10 SYRINGE (ML) INJECTION PRN
Status: DISCONTINUED | OUTPATIENT
Start: 2017-08-19 | End: 2017-08-19 | Stop reason: ALTCHOICE

## 2017-08-19 RX ORDER — BISACODYL 10 MG
10 SUPPOSITORY, RECTAL RECTAL DAILY PRN
Status: DISCONTINUED | OUTPATIENT
Start: 2017-08-19 | End: 2017-08-22 | Stop reason: HOSPADM

## 2017-08-19 RX ORDER — ACETAMINOPHEN 325 MG/1
325 TABLET ORAL EVERY 4 HOURS PRN
Status: DISCONTINUED | OUTPATIENT
Start: 2017-08-19 | End: 2017-08-19

## 2017-08-19 RX ORDER — DIPHENHYDRAMINE HYDROCHLORIDE 50 MG/ML
25 INJECTION INTRAMUSCULAR; INTRAVENOUS EVERY 6 HOURS PRN
Status: DISCONTINUED | OUTPATIENT
Start: 2017-08-19 | End: 2017-08-19 | Stop reason: ALTCHOICE

## 2017-08-19 RX ORDER — FERROUS SULFATE 325(65) MG
325 TABLET ORAL 2 TIMES DAILY WITH MEALS
Status: DISCONTINUED | OUTPATIENT
Start: 2017-08-19 | End: 2017-08-22 | Stop reason: HOSPADM

## 2017-08-19 RX ORDER — OXYCODONE HYDROCHLORIDE AND ACETAMINOPHEN 5; 325 MG/1; MG/1
1 TABLET ORAL EVERY 4 HOURS PRN
Status: DISCONTINUED | OUTPATIENT
Start: 2017-08-19 | End: 2017-08-22 | Stop reason: HOSPADM

## 2017-08-19 RX ORDER — KETOROLAC TROMETHAMINE 30 MG/ML
30 INJECTION, SOLUTION INTRAMUSCULAR; INTRAVENOUS EVERY 6 HOURS
Status: DISCONTINUED | OUTPATIENT
Start: 2017-08-19 | End: 2017-08-19 | Stop reason: ALTCHOICE

## 2017-08-19 RX ORDER — KETOROLAC TROMETHAMINE 30 MG/ML
30 INJECTION, SOLUTION INTRAMUSCULAR; INTRAVENOUS EVERY 6 HOURS
Status: DISCONTINUED | OUTPATIENT
Start: 2017-08-19 | End: 2017-08-19

## 2017-08-19 RX ORDER — ONDANSETRON 2 MG/ML
4 INJECTION INTRAMUSCULAR; INTRAVENOUS EVERY 6 HOURS PRN
Status: DISCONTINUED | OUTPATIENT
Start: 2017-08-19 | End: 2017-08-19

## 2017-08-19 RX ORDER — NALBUPHINE HCL 10 MG/ML
5 AMPUL (ML) INJECTION EVERY 4 HOURS PRN
Status: DISCONTINUED | OUTPATIENT
Start: 2017-08-19 | End: 2017-08-22 | Stop reason: HOSPADM

## 2017-08-19 RX ORDER — DOCUSATE SODIUM 100 MG/1
100 CAPSULE, LIQUID FILLED ORAL 2 TIMES DAILY
Status: DISCONTINUED | OUTPATIENT
Start: 2017-08-19 | End: 2017-08-22 | Stop reason: HOSPADM

## 2017-08-19 RX ORDER — SODIUM CHLORIDE, SODIUM LACTATE, POTASSIUM CHLORIDE, CALCIUM CHLORIDE 600; 310; 30; 20 MG/100ML; MG/100ML; MG/100ML; MG/100ML
INJECTION, SOLUTION INTRAVENOUS CONTINUOUS
Status: DISCONTINUED | OUTPATIENT
Start: 2017-08-19 | End: 2017-08-19 | Stop reason: ALTCHOICE

## 2017-08-19 RX ORDER — OXYCODONE HYDROCHLORIDE AND ACETAMINOPHEN 5; 325 MG/1; MG/1
2 TABLET ORAL EVERY 4 HOURS PRN
Status: DISCONTINUED | OUTPATIENT
Start: 2017-08-19 | End: 2017-08-22 | Stop reason: HOSPADM

## 2017-08-19 RX ORDER — LIDOCAINE HYDROCHLORIDE AND EPINEPHRINE 20; 5 MG/ML; UG/ML
INJECTION, SOLUTION EPIDURAL; INFILTRATION; INTRACAUDAL; PERINEURAL PRN
Status: DISCONTINUED | OUTPATIENT
Start: 2017-08-19 | End: 2017-08-19 | Stop reason: SDUPTHER

## 2017-08-19 RX ORDER — LANOLIN 100 %
OINTMENT (GRAM) TOPICAL
Status: DISCONTINUED | OUTPATIENT
Start: 2017-08-19 | End: 2017-08-22 | Stop reason: HOSPADM

## 2017-08-19 RX ORDER — SODIUM CHLORIDE 0.9 % (FLUSH) 0.9 %
10 SYRINGE (ML) INJECTION EVERY 12 HOURS SCHEDULED
Status: DISCONTINUED | OUTPATIENT
Start: 2017-08-19 | End: 2017-08-19 | Stop reason: ALTCHOICE

## 2017-08-19 RX ORDER — DIPHENHYDRAMINE HCL 25 MG
25 TABLET ORAL EVERY 6 HOURS PRN
Status: DISCONTINUED | OUTPATIENT
Start: 2017-08-19 | End: 2017-08-19

## 2017-08-19 RX ORDER — NALBUPHINE HCL 10 MG/ML
5 AMPUL (ML) INJECTION EVERY 4 HOURS PRN
Status: DISCONTINUED | OUTPATIENT
Start: 2017-08-19 | End: 2017-08-19

## 2017-08-19 RX ORDER — KETOROLAC TROMETHAMINE 30 MG/ML
INJECTION, SOLUTION INTRAMUSCULAR; INTRAVENOUS PRN
Status: DISCONTINUED | OUTPATIENT
Start: 2017-08-19 | End: 2017-08-19 | Stop reason: SDUPTHER

## 2017-08-19 RX ORDER — IBUPROFEN 600 MG/1
600 TABLET ORAL EVERY 6 HOURS PRN
Status: DISCONTINUED | OUTPATIENT
Start: 2017-08-21 | End: 2017-08-20

## 2017-08-19 RX ORDER — ACETAMINOPHEN 325 MG/1
650 TABLET ORAL EVERY 4 HOURS PRN
Status: DISCONTINUED | OUTPATIENT
Start: 2017-08-19 | End: 2017-08-22 | Stop reason: HOSPADM

## 2017-08-19 RX ORDER — MORPHINE SULFATE 1 MG/ML
INJECTION, SOLUTION EPIDURAL; INTRATHECAL; INTRAVENOUS PRN
Status: DISCONTINUED | OUTPATIENT
Start: 2017-08-19 | End: 2017-08-19 | Stop reason: SDUPTHER

## 2017-08-19 RX ORDER — SIMETHICONE 80 MG
80 TABLET,CHEWABLE ORAL EVERY 6 HOURS PRN
Status: DISCONTINUED | OUTPATIENT
Start: 2017-08-19 | End: 2017-08-22 | Stop reason: HOSPADM

## 2017-08-19 RX ORDER — IBUPROFEN 800 MG/1
800 TABLET ORAL EVERY 8 HOURS
Status: DISCONTINUED | OUTPATIENT
Start: 2017-08-19 | End: 2017-08-19 | Stop reason: CLARIF

## 2017-08-19 RX ORDER — DEXAMETHASONE SODIUM PHOSPHATE 10 MG/ML
INJECTION INTRAMUSCULAR; INTRAVENOUS PRN
Status: DISCONTINUED | OUTPATIENT
Start: 2017-08-19 | End: 2017-08-19 | Stop reason: SDUPTHER

## 2017-08-19 RX ORDER — MAGNESIUM HYDROXIDE/ALUMINUM HYDROXICE/SIMETHICONE 120; 1200; 1200 MG/30ML; MG/30ML; MG/30ML
30 SUSPENSION ORAL EVERY 6 HOURS PRN
Status: DISCONTINUED | OUTPATIENT
Start: 2017-08-19 | End: 2017-08-22 | Stop reason: HOSPADM

## 2017-08-19 RX ORDER — TERBUTALINE SULFATE 1 MG/ML
INJECTION, SOLUTION SUBCUTANEOUS
Status: DISCONTINUED
Start: 2017-08-19 | End: 2017-08-19

## 2017-08-19 RX ORDER — ONDANSETRON 2 MG/ML
4 INJECTION INTRAMUSCULAR; INTRAVENOUS EVERY 6 HOURS PRN
Status: DISCONTINUED | OUTPATIENT
Start: 2017-08-19 | End: 2017-08-19 | Stop reason: SDUPTHER

## 2017-08-19 RX ORDER — ONDANSETRON 2 MG/ML
INJECTION INTRAMUSCULAR; INTRAVENOUS PRN
Status: DISCONTINUED | OUTPATIENT
Start: 2017-08-19 | End: 2017-08-19 | Stop reason: SDUPTHER

## 2017-08-19 RX ORDER — IBUPROFEN 800 MG/1
800 TABLET ORAL EVERY 8 HOURS
Status: DISCONTINUED | OUTPATIENT
Start: 2017-08-21 | End: 2017-08-20

## 2017-08-19 RX ADMIN — NALBUPHINE HYDROCHLORIDE 10 MG: 10 INJECTION, SOLUTION INTRAMUSCULAR; INTRAVENOUS; SUBCUTANEOUS at 00:11

## 2017-08-19 RX ADMIN — ONDANSETRON 4 MG: 2 INJECTION INTRAMUSCULAR; INTRAVENOUS at 09:24

## 2017-08-19 RX ADMIN — DEXTROSE MONOHYDRATE 5 MILLION UNITS: 5 INJECTION INTRAVENOUS at 07:48

## 2017-08-19 RX ADMIN — PHENYLEPHRINE HYDROCHLORIDE 200 MCG: 10 INJECTION INTRAVENOUS at 09:57

## 2017-08-19 RX ADMIN — DIPHENHYDRAMINE HYDROCHLORIDE 25 MG: 50 INJECTION, SOLUTION INTRAMUSCULAR; INTRAVENOUS at 12:36

## 2017-08-19 RX ADMIN — SODIUM CHLORIDE, POTASSIUM CHLORIDE, SODIUM LACTATE AND CALCIUM CHLORIDE: 600; 310; 30; 20 INJECTION, SOLUTION INTRAVENOUS at 07:15

## 2017-08-19 RX ADMIN — Medication: at 18:07

## 2017-08-19 RX ADMIN — KETOROLAC TROMETHAMINE 30 MG: 30 INJECTION, SOLUTION INTRAMUSCULAR; INTRAVENOUS at 18:08

## 2017-08-19 RX ADMIN — LIDOCAINE HYDROCHLORIDE,EPINEPHRINE BITARTRATE 10 ML: 20; .005 INJECTION, SOLUTION EPIDURAL; INFILTRATION; INTRACAUDAL; PERINEURAL at 09:00

## 2017-08-19 RX ADMIN — MORPHINE SULFATE 3 MG: 1 INJECTION EPIDURAL; INTRATHECAL; INTRAVENOUS at 09:19

## 2017-08-19 RX ADMIN — SODIUM CHLORIDE, POTASSIUM CHLORIDE, SODIUM LACTATE AND CALCIUM CHLORIDE: 600; 310; 30; 20 INJECTION, SOLUTION INTRAVENOUS at 09:41

## 2017-08-19 RX ADMIN — SODIUM CHLORIDE, POTASSIUM CHLORIDE, SODIUM LACTATE AND CALCIUM CHLORIDE: 600; 310; 30; 20 INJECTION, SOLUTION INTRAVENOUS at 00:10

## 2017-08-19 RX ADMIN — PRENATAL VITAMINS-IRON FUMARATE 27 MG IRON-FOLIC ACID 0.8 MG TABLET 1 TABLET: at 18:06

## 2017-08-19 RX ADMIN — PHENYLEPHRINE HYDROCHLORIDE 200 MCG: 10 INJECTION INTRAVENOUS at 10:34

## 2017-08-19 RX ADMIN — OXYCODONE HYDROCHLORIDE AND ACETAMINOPHEN 1 TABLET: 5; 325 TABLET ORAL at 12:34

## 2017-08-19 RX ADMIN — DOCUSATE SODIUM 100 MG: 100 CAPSULE, LIQUID FILLED ORAL at 18:06

## 2017-08-19 RX ADMIN — PHENYLEPHRINE HYDROCHLORIDE 100 MCG: 10 INJECTION INTRAVENOUS at 09:50

## 2017-08-19 RX ADMIN — NALBUPHINE HYDROCHLORIDE 5 MG: 10 INJECTION, SOLUTION INTRAMUSCULAR; INTRAVENOUS; SUBCUTANEOUS at 17:11

## 2017-08-19 RX ADMIN — KETOROLAC TROMETHAMINE 30 MG: 30 INJECTION, SOLUTION INTRAMUSCULAR; INTRAVENOUS at 09:24

## 2017-08-19 RX ADMIN — OXYTOCIN 20 UNITS: 10 INJECTION INTRAVENOUS at 09:41

## 2017-08-19 RX ADMIN — DEXAMETHASONE SODIUM PHOSPHATE 10 MG: 10 INJECTION INTRAMUSCULAR; INTRAVENOUS at 09:46

## 2017-08-19 RX ADMIN — SODIUM CHLORIDE, POTASSIUM CHLORIDE, SODIUM LACTATE AND CALCIUM CHLORIDE: 600; 310; 30; 20 INJECTION, SOLUTION INTRAVENOUS at 14:26

## 2017-08-19 RX ADMIN — Medication 1 MILLI-UNITS/MIN: at 08:32

## 2017-08-19 RX ADMIN — SODIUM CHLORIDE, POTASSIUM CHLORIDE, SODIUM LACTATE AND CALCIUM CHLORIDE: 600; 310; 30; 20 INJECTION, SOLUTION INTRAVENOUS at 05:05

## 2017-08-19 RX ADMIN — OXYTOCIN 20 UNITS: 10 INJECTION INTRAVENOUS at 09:18

## 2017-08-19 RX ADMIN — PHENYLEPHRINE HYDROCHLORIDE 200 MCG: 10 INJECTION INTRAVENOUS at 10:20

## 2017-08-19 RX ADMIN — Medication 12 ML/HR: at 05:40

## 2017-08-19 RX ADMIN — NALBUPHINE HYDROCHLORIDE 10 MG: 10 INJECTION, SOLUTION INTRAMUSCULAR; INTRAVENOUS; SUBCUTANEOUS at 02:43

## 2017-08-19 RX ADMIN — SODIUM CHLORIDE, PRESERVATIVE FREE 10 ML: 5 INJECTION INTRAVENOUS at 08:25

## 2017-08-19 RX ADMIN — SODIUM CHLORIDE, POTASSIUM CHLORIDE, SODIUM LACTATE AND CALCIUM CHLORIDE: 600; 310; 30; 20 INJECTION, SOLUTION INTRAVENOUS at 02:38

## 2017-08-19 RX ADMIN — DOCUSATE SODIUM 100 MG: 100 CAPSULE, LIQUID FILLED ORAL at 21:13

## 2017-08-19 RX ADMIN — FAMOTIDINE 20 MG: 10 INJECTION, SOLUTION INTRAVENOUS at 08:25

## 2017-08-19 RX ADMIN — FAMOTIDINE 20 MG: 10 INJECTION, SOLUTION INTRAVENOUS at 21:13

## 2017-08-19 RX ADMIN — LIDOCAINE HYDROCHLORIDE,EPINEPHRINE BITARTRATE 10 ML: 20; .005 INJECTION, SOLUTION EPIDURAL; INFILTRATION; INTRACAUDAL; PERINEURAL at 09:04

## 2017-08-19 RX ADMIN — CEFAZOLIN SODIUM 1 G: 1 INJECTION, POWDER, FOR SOLUTION INTRAMUSCULAR; INTRAVENOUS at 17:15

## 2017-08-19 ASSESSMENT — PAIN SCALES - GENERAL
PAINLEVEL_OUTOF10: 5
PAINLEVEL_OUTOF10: 9
PAINLEVEL_OUTOF10: 8
PAINLEVEL_OUTOF10: 7
PAINLEVEL_OUTOF10: 5

## 2017-08-20 LAB
ALBUMIN SERPL-MCNC: 2.6 G/DL (ref 3.9–4.9)
ALP BLD-CCNC: 87 U/L (ref 40–130)
ALT SERPL-CCNC: 8 U/L (ref 0–33)
ANION GAP SERPL CALCULATED.3IONS-SCNC: 12 MEQ/L (ref 7–13)
AST SERPL-CCNC: 14 U/L (ref 0–35)
BASOPHILS ABSOLUTE: 0 K/UL (ref 0–0.2)
BASOPHILS RELATIVE PERCENT: 0.1 %
BILIRUB SERPL-MCNC: 0.1 MG/DL (ref 0–1.2)
BUN BLDV-MCNC: 8 MG/DL (ref 6–20)
CALCIUM SERPL-MCNC: 8.5 MG/DL (ref 8.6–10.2)
CHLORIDE BLD-SCNC: 104 MEQ/L (ref 98–107)
CO2: 22 MEQ/L (ref 22–29)
CREAT SERPL-MCNC: 0.62 MG/DL (ref 0.5–0.9)
EOSINOPHILS ABSOLUTE: 0 K/UL (ref 0–0.7)
EOSINOPHILS RELATIVE PERCENT: 0 %
GFR AFRICAN AMERICAN: >60
GFR NON-AFRICAN AMERICAN: >60
GLOBULIN: 2.2 G/DL (ref 2.3–3.5)
GLUCOSE BLD-MCNC: 103 MG/DL (ref 74–109)
HCT VFR BLD CALC: 25.8 % (ref 37–47)
HEMOGLOBIN: 8.3 G/DL (ref 12–16)
LYMPHOCYTES ABSOLUTE: 2.2 K/UL (ref 1–4.8)
LYMPHOCYTES RELATIVE PERCENT: 14 %
MCH RBC QN AUTO: 29.7 PG (ref 27–31.3)
MCHC RBC AUTO-ENTMCNC: 32.3 % (ref 33–37)
MCV RBC AUTO: 91.8 FL (ref 82–100)
MONOCYTES ABSOLUTE: 1 K/UL (ref 0.2–0.8)
MONOCYTES RELATIVE PERCENT: 6.1 %
NEUTROPHILS ABSOLUTE: 12.7 K/UL (ref 1.4–6.5)
NEUTROPHILS RELATIVE PERCENT: 79.8 %
PDW BLD-RTO: 13.4 % (ref 11.5–14.5)
PLATELET # BLD: 128 K/UL (ref 130–400)
POTASSIUM SERPL-SCNC: 4.2 MEQ/L (ref 3.5–5.1)
RBC # BLD: 2.81 M/UL (ref 4.2–5.4)
RPR: NORMAL
SODIUM BLD-SCNC: 138 MEQ/L (ref 132–144)
TOTAL PROTEIN: 4.8 G/DL (ref 6.4–8.1)
WBC # BLD: 15.9 K/UL (ref 4.8–10.8)

## 2017-08-20 PROCEDURE — 80053 COMPREHEN METABOLIC PANEL: CPT

## 2017-08-20 PROCEDURE — 6360000002 HC RX W HCPCS: Performed by: OBSTETRICS & GYNECOLOGY

## 2017-08-20 PROCEDURE — 1220000000 HC SEMI PRIVATE OB R&B

## 2017-08-20 PROCEDURE — 85025 COMPLETE CBC W/AUTO DIFF WBC: CPT

## 2017-08-20 PROCEDURE — 36415 COLL VENOUS BLD VENIPUNCTURE: CPT

## 2017-08-20 PROCEDURE — 6370000000 HC RX 637 (ALT 250 FOR IP): Performed by: OBSTETRICS & GYNECOLOGY

## 2017-08-20 RX ORDER — TRAMADOL HYDROCHLORIDE 50 MG/1
50 TABLET ORAL EVERY 6 HOURS PRN
Status: DISCONTINUED | OUTPATIENT
Start: 2017-08-20 | End: 2017-08-22 | Stop reason: HOSPADM

## 2017-08-20 RX ORDER — IBUPROFEN 800 MG/1
800 TABLET ORAL EVERY 8 HOURS PRN
Status: DISCONTINUED | OUTPATIENT
Start: 2017-08-20 | End: 2017-08-22 | Stop reason: HOSPADM

## 2017-08-20 RX ORDER — NALBUPHINE HCL 10 MG/ML
5 AMPUL (ML) INJECTION ONCE
Status: COMPLETED | OUTPATIENT
Start: 2017-08-20 | End: 2017-08-20

## 2017-08-20 RX ADMIN — DOCUSATE SODIUM 100 MG: 100 CAPSULE, LIQUID FILLED ORAL at 08:28

## 2017-08-20 RX ADMIN — PRENATAL VITAMINS-IRON FUMARATE 27 MG IRON-FOLIC ACID 0.8 MG TABLET 1 TABLET: at 08:28

## 2017-08-20 RX ADMIN — IBUPROFEN 800 MG: 800 TABLET, FILM COATED ORAL at 14:45

## 2017-08-20 RX ADMIN — OXYCODONE HYDROCHLORIDE AND ACETAMINOPHEN 1 TABLET: 5; 325 TABLET ORAL at 12:31

## 2017-08-20 RX ADMIN — OXYCODONE HYDROCHLORIDE AND ACETAMINOPHEN 2 TABLET: 5; 325 TABLET ORAL at 00:10

## 2017-08-20 RX ADMIN — DOCUSATE SODIUM 100 MG: 100 CAPSULE, LIQUID FILLED ORAL at 17:21

## 2017-08-20 RX ADMIN — FERROUS SULFATE TAB 325 MG (65 MG ELEMENTAL FE) 325 MG: 325 (65 FE) TAB at 08:28

## 2017-08-20 RX ADMIN — FERROUS SULFATE TAB 325 MG (65 MG ELEMENTAL FE) 325 MG: 325 (65 FE) TAB at 17:22

## 2017-08-20 RX ADMIN — IBUPROFEN 800 MG: 800 TABLET, FILM COATED ORAL at 23:19

## 2017-08-20 RX ADMIN — NALBUPHINE HYDROCHLORIDE 5 MG: 10 INJECTION, SOLUTION INTRAMUSCULAR; INTRAVENOUS; SUBCUTANEOUS at 20:22

## 2017-08-20 RX ADMIN — MAGNESIUM HYDROXIDE 30 ML: 400 SUSPENSION ORAL at 20:07

## 2017-08-20 RX ADMIN — SIMETHICONE 80 MG: 80 TABLET, CHEWABLE ORAL at 12:32

## 2017-08-20 RX ADMIN — OXYCODONE HYDROCHLORIDE AND ACETAMINOPHEN 2 TABLET: 5; 325 TABLET ORAL at 17:20

## 2017-08-20 RX ADMIN — OXYCODONE HYDROCHLORIDE AND ACETAMINOPHEN 2 TABLET: 5; 325 TABLET ORAL at 06:29

## 2017-08-20 RX ADMIN — DOCUSATE SODIUM 100 MG: 100 CAPSULE, LIQUID FILLED ORAL at 23:19

## 2017-08-20 ASSESSMENT — PAIN SCALES - GENERAL
PAINLEVEL_OUTOF10: 6
PAINLEVEL_OUTOF10: 8
PAINLEVEL_OUTOF10: 6
PAINLEVEL_OUTOF10: 8
PAINLEVEL_OUTOF10: 9
PAINLEVEL_OUTOF10: 7
PAINLEVEL_OUTOF10: 6
PAINLEVEL_OUTOF10: 8

## 2017-08-21 PROCEDURE — 1220000000 HC SEMI PRIVATE OB R&B

## 2017-08-21 PROCEDURE — 6370000000 HC RX 637 (ALT 250 FOR IP): Performed by: OBSTETRICS & GYNECOLOGY

## 2017-08-21 PROCEDURE — 6360000002 HC RX W HCPCS: Performed by: OBSTETRICS & GYNECOLOGY

## 2017-08-21 RX ORDER — ONDANSETRON 4 MG/1
4 TABLET, FILM COATED ORAL EVERY 6 HOURS PRN
Status: DISCONTINUED | OUTPATIENT
Start: 2017-08-21 | End: 2017-08-22 | Stop reason: HOSPADM

## 2017-08-21 RX ADMIN — FERROUS SULFATE TAB 325 MG (65 MG ELEMENTAL FE) 325 MG: 325 (65 FE) TAB at 16:51

## 2017-08-21 RX ADMIN — FERROUS SULFATE TAB 325 MG (65 MG ELEMENTAL FE) 325 MG: 325 (65 FE) TAB at 08:59

## 2017-08-21 RX ADMIN — DOCUSATE SODIUM 100 MG: 100 CAPSULE, LIQUID FILLED ORAL at 08:56

## 2017-08-21 RX ADMIN — TRAMADOL HYDROCHLORIDE 50 MG: 50 TABLET, FILM COATED ORAL at 22:16

## 2017-08-21 RX ADMIN — IBUPROFEN 800 MG: 800 TABLET, FILM COATED ORAL at 16:51

## 2017-08-21 RX ADMIN — PRENATAL VITAMINS-IRON FUMARATE 27 MG IRON-FOLIC ACID 0.8 MG TABLET 1 TABLET: at 08:59

## 2017-08-21 RX ADMIN — ONDANSETRON HYDROCHLORIDE 4 MG: 4 TABLET, FILM COATED ORAL at 12:04

## 2017-08-21 RX ADMIN — IBUPROFEN 800 MG: 800 TABLET, FILM COATED ORAL at 07:00

## 2017-08-21 RX ADMIN — TRAMADOL HYDROCHLORIDE 50 MG: 50 TABLET, FILM COATED ORAL at 01:32

## 2017-08-21 RX ADMIN — DOCUSATE SODIUM 100 MG: 100 CAPSULE, LIQUID FILLED ORAL at 08:57

## 2017-08-21 RX ADMIN — DOCUSATE SODIUM 100 MG: 100 CAPSULE, LIQUID FILLED ORAL at 21:30

## 2017-08-21 ASSESSMENT — PAIN DESCRIPTION - ORIENTATION: ORIENTATION: LOWER

## 2017-08-21 ASSESSMENT — PAIN DESCRIPTION - LOCATION: LOCATION: ABDOMEN

## 2017-08-21 ASSESSMENT — PAIN DESCRIPTION - FREQUENCY: FREQUENCY: INTERMITTENT

## 2017-08-21 ASSESSMENT — PAIN SCALES - GENERAL
PAINLEVEL_OUTOF10: 7
PAINLEVEL_OUTOF10: 7
PAINLEVEL_OUTOF10: 6
PAINLEVEL_OUTOF10: 7
PAINLEVEL_OUTOF10: 6

## 2017-08-21 ASSESSMENT — PAIN DESCRIPTION - DESCRIPTORS: DESCRIPTORS: CRAMPING

## 2017-08-22 VITALS
TEMPERATURE: 98.4 F | HEART RATE: 112 BPM | WEIGHT: 214 LBS | SYSTOLIC BLOOD PRESSURE: 135 MMHG | HEIGHT: 66 IN | RESPIRATION RATE: 24 BRPM | BODY MASS INDEX: 34.39 KG/M2 | OXYGEN SATURATION: 100 % | DIASTOLIC BLOOD PRESSURE: 84 MMHG

## 2017-08-22 PROCEDURE — 6370000000 HC RX 637 (ALT 250 FOR IP): Performed by: OBSTETRICS & GYNECOLOGY

## 2017-08-22 RX ORDER — OXYCODONE HYDROCHLORIDE AND ACETAMINOPHEN 5; 325 MG/1; MG/1
TABLET ORAL
Qty: 30 TABLET | Refills: 0 | Status: SHIPPED | OUTPATIENT
Start: 2017-08-22 | End: 2017-10-06

## 2017-08-22 RX ORDER — DOCUSATE SODIUM 100 MG/1
100 CAPSULE, LIQUID FILLED ORAL 2 TIMES DAILY PRN
Qty: 60 CAPSULE | Refills: 2 | Status: SHIPPED | OUTPATIENT
Start: 2017-08-22 | End: 2017-10-06

## 2017-08-22 RX ORDER — PNV NO.95/FERROUS FUM/FOLIC AC 28MG-0.8MG
1 TABLET ORAL 2 TIMES DAILY
Qty: 60 TABLET | Refills: 2 | Status: SHIPPED | OUTPATIENT
Start: 2017-08-22 | End: 2017-10-06

## 2017-08-22 RX ORDER — IBUPROFEN 800 MG/1
800 TABLET ORAL EVERY 6 HOURS PRN
Qty: 120 TABLET | Refills: 3 | Status: SHIPPED | OUTPATIENT
Start: 2017-08-22 | End: 2017-10-06

## 2017-08-22 RX ADMIN — PRENATAL VITAMINS-IRON FUMARATE 27 MG IRON-FOLIC ACID 0.8 MG TABLET 1 TABLET: at 09:07

## 2017-08-22 RX ADMIN — FERROUS SULFATE TAB 325 MG (65 MG ELEMENTAL FE) 325 MG: 325 (65 FE) TAB at 09:08

## 2017-08-22 RX ADMIN — DOCUSATE SODIUM 100 MG: 100 CAPSULE, LIQUID FILLED ORAL at 09:07

## 2017-08-22 RX ADMIN — IBUPROFEN 800 MG: 800 TABLET, FILM COATED ORAL at 09:08

## 2017-08-30 ENCOUNTER — OFFICE VISIT (OUTPATIENT)
Dept: OBGYN | Age: 27
End: 2017-08-30

## 2017-08-30 VITALS
HEART RATE: 100 BPM | HEIGHT: 66 IN | BODY MASS INDEX: 30.7 KG/M2 | SYSTOLIC BLOOD PRESSURE: 124 MMHG | DIASTOLIC BLOOD PRESSURE: 82 MMHG | WEIGHT: 191 LBS

## 2017-08-30 DIAGNOSIS — Z09 POSTOP CHECK: Primary | ICD-10-CM

## 2017-08-30 DIAGNOSIS — N92.6 IRREGULAR MENSES: ICD-10-CM

## 2017-08-30 PROCEDURE — 99024 POSTOP FOLLOW-UP VISIT: CPT | Performed by: OBSTETRICS & GYNECOLOGY

## 2017-08-30 RX ORDER — ESCITALOPRAM OXALATE 10 MG/1
10 TABLET ORAL DAILY
Qty: 30 TABLET | Refills: 3 | Status: SHIPPED | OUTPATIENT
Start: 2017-08-30 | End: 2018-03-12 | Stop reason: ALTCHOICE

## 2017-09-16 PROBLEM — Z09 POSTOP CHECK: Status: ACTIVE | Noted: 2017-09-16

## 2017-09-16 PROBLEM — N92.6 IRREGULAR MENSES: Status: ACTIVE | Noted: 2017-09-16

## 2017-09-16 ASSESSMENT — ENCOUNTER SYMPTOMS
COUGH: 0
NAUSEA: 0
SHORTNESS OF BREATH: 0
VOMITING: 0

## 2017-10-06 ENCOUNTER — PROCEDURE VISIT (OUTPATIENT)
Dept: OBGYN | Age: 27
End: 2017-10-06

## 2017-10-06 VITALS
BODY MASS INDEX: 29.57 KG/M2 | DIASTOLIC BLOOD PRESSURE: 84 MMHG | HEIGHT: 66 IN | SYSTOLIC BLOOD PRESSURE: 122 MMHG | HEART RATE: 84 BPM | WEIGHT: 184 LBS

## 2017-10-06 DIAGNOSIS — N92.6 IRREGULAR MENSES: ICD-10-CM

## 2017-10-06 DIAGNOSIS — Z30.430 ENCOUNTER FOR INSERTION OF MIRENA IUD: Primary | ICD-10-CM

## 2017-10-06 LAB
CONTROL: YES
PREGNANCY TEST URINE, POC: NORMAL

## 2017-10-06 PROCEDURE — 99999 PR OFFICE/OUTPT VISIT,PROCEDURE ONLY: CPT | Performed by: OBSTETRICS & GYNECOLOGY

## 2017-10-06 PROCEDURE — 81025 URINE PREGNANCY TEST: CPT | Performed by: OBSTETRICS & GYNECOLOGY

## 2017-10-06 PROCEDURE — 58300 INSERT INTRAUTERINE DEVICE: CPT | Performed by: OBSTETRICS & GYNECOLOGY

## 2017-10-06 NOTE — MR AVS SNAPSHOT
After Visit Summary             Gala Birch   10/6/2017 10:45 AM   Procedure visit    Description:  Female : 1990   Provider:  Yi Puckett MD   Department:  [facility]              Your Follow-Up and Future Appointments         Below is a list of your follow-up and future appointments. This may not be a complete list as you may have made appointments directly with providers that we are not aware of or your providers may have made some for you. Please call your providers to confirm appointments. It is important to keep your appointments. Please bring your current insurance card, photo ID, co-pay, and all medication bottles to your appointment. If self-pay, payment is expected at the time of service. Your To-Do List     Future Appointments Provider Department Dept Phone    11/3/2017 1:00 PM Yi Puckett MD [facility] [phone]    Please arrive 15 minutes prior to appointment time, bring insurance card and photo ID. Information from Your Visit        Department     Name Address Phone Fax    [facility] [address] [facility] [phone]      You Were Seen for:         Comments    Encounter for insertion of mirena IUD   [[id number]]         Vital Signs     Blood Pressure Pulse Height Weight Body Mass Index Smoking Status    122/84 (Site: Right Arm, Position: Sitting, Cuff Size: Medium Adult) 84 5' 6\" (1.676 m) 184 lb (83.5 kg) 29.7 kg/m2 Never Smoker      Additional Information about your Body Mass Index (BMI)           Your BMI as listed above is considered overweight (25.0-29.9). BMI is an estimate of body fat, calculated from your height and weight. The higher your BMI, the greater your risk of heart disease, high blood pressure, type 2 diabetes, stroke, gallstones, arthritis, sleep apnea, and certain cancers. BMI is not perfect.   It may overestimate body fat in athletes and

## 2017-10-25 PROBLEM — Z30.430 ENCOUNTER FOR INSERTION OF MIRENA IUD: Status: ACTIVE | Noted: 2017-10-25

## 2017-11-03 ENCOUNTER — OFFICE VISIT (OUTPATIENT)
Dept: OBGYN | Age: 27
End: 2017-11-03

## 2017-11-03 VITALS
DIASTOLIC BLOOD PRESSURE: 82 MMHG | HEIGHT: 66 IN | HEART RATE: 68 BPM | SYSTOLIC BLOOD PRESSURE: 104 MMHG | BODY MASS INDEX: 29.57 KG/M2 | WEIGHT: 184 LBS

## 2017-11-03 DIAGNOSIS — Z30.431 IUD CHECK UP: Primary | ICD-10-CM

## 2017-11-03 PROCEDURE — 99213 OFFICE O/P EST LOW 20 MIN: CPT | Performed by: OBSTETRICS & GYNECOLOGY

## 2017-11-03 RX ORDER — OLOPATADINE HCL 0.2 %
DROPS OPHTHALMIC (EYE)
Refills: 0 | COMMUNITY
Start: 2017-10-10 | End: 2018-02-23 | Stop reason: ALTCHOICE

## 2017-11-08 PROBLEM — Z30.431 IUD CHECK UP: Status: ACTIVE | Noted: 2017-11-08

## 2017-11-09 NOTE — PROGRESS NOTES
Claudell Hesselbach is a 31 y.o. female who presents here today for complaints of 4 weeks s/p IUD insertion for IUD checkup. Patient denies any abnormal pain, does report irregular spotting since insertion, denies any blood clots or hemorrhage. Generally tolerating IUD well. Vitals:  /82 (Site: Right Arm, Position: Sitting, Cuff Size: Large Adult)   Pulse 68   Ht 5' 6\" (1.676 m)   Wt 184 lb (83.5 kg)   Breastfeeding? No   BMI 29.70 kg/m²   Allergies:  Geodon [ziprasidone hcl]; Onion; and Seasonal  Past Medical History:   Diagnosis Date    Asthma     Bipolar disorder ([facility] [address]     Depression     Postpartum depression 2017     Past Surgical History:   Procedure Laterality Date    APPENDECTOMY  2016     SECTION N/A 2017     SECTION [facility]    LAPAROSCOPIC APPENDECTOMY  2016    Dr Joel Smith EXTRACTION       OB History      Para Term  AB Living    3 1 1 0 2 1    SAB TAB Ectopic Molar Multiple Live Births    2 0 0   0 1        Family History   Problem Relation Age of Onset    Depression Mother     High Blood Pressure Father     Arthritis Maternal Grandmother     Cancer Maternal Grandmother     Diabetes Maternal Grandmother     Arthritis Paternal Grandmother     Diabetes Paternal Grandmother     Stroke Paternal Grandmother     Cancer Paternal Grandfather      Social History     Social History    Marital status: Single     Spouse name: N/A    Number of children: N/A    Years of education: N/A     Occupational History    Not on file.      Social History Main Topics    Smoking status: Never Smoker    Smokeless tobacco: Never Used    Alcohol use No    Drug use: No    Sexual activity: Yes     Partners: Male     Other Topics Concern    Not on file     Social History Narrative    No narrative on file       Contraceptive method:  IUD    Patient's medications, allergies, past medical, surgical,

## 2018-01-24 ENCOUNTER — OFFICE VISIT (OUTPATIENT)
Dept: OBGYN CLINIC | Age: 28
End: 2018-01-24
Payer: MEDICARE

## 2018-01-24 VITALS
WEIGHT: 190 LBS | DIASTOLIC BLOOD PRESSURE: 66 MMHG | HEIGHT: 66 IN | SYSTOLIC BLOOD PRESSURE: 100 MMHG | BODY MASS INDEX: 30.53 KG/M2

## 2018-01-24 DIAGNOSIS — N89.8 VAGINAL DISCHARGE: Primary | ICD-10-CM

## 2018-01-24 DIAGNOSIS — Z20.2 EXPOSURE TO STD: ICD-10-CM

## 2018-01-24 PROCEDURE — G8484 FLU IMMUNIZE NO ADMIN: HCPCS | Performed by: OBSTETRICS & GYNECOLOGY

## 2018-01-24 PROCEDURE — 99213 OFFICE O/P EST LOW 20 MIN: CPT | Performed by: OBSTETRICS & GYNECOLOGY

## 2018-01-24 PROCEDURE — 1036F TOBACCO NON-USER: CPT | Performed by: OBSTETRICS & GYNECOLOGY

## 2018-01-24 PROCEDURE — G8417 CALC BMI ABV UP PARAM F/U: HCPCS | Performed by: OBSTETRICS & GYNECOLOGY

## 2018-01-24 PROCEDURE — G8427 DOCREV CUR MEDS BY ELIG CLIN: HCPCS | Performed by: OBSTETRICS & GYNECOLOGY

## 2018-01-24 RX ORDER — METRONIDAZOLE 500 MG/1
500 TABLET ORAL 2 TIMES DAILY
Qty: 14 TABLET | Refills: 0 | Status: SHIPPED | OUTPATIENT
Start: 2018-01-24 | End: 2018-01-31

## 2018-01-24 RX ORDER — IBUPROFEN 800 MG/1
TABLET ORAL
Refills: 0 | COMMUNITY
Start: 2017-11-21 | End: 2018-02-18 | Stop reason: ALTCHOICE

## 2018-01-25 ASSESSMENT — ENCOUNTER SYMPTOMS
ABDOMINAL PAIN: 0
SHORTNESS OF BREATH: 0
APNEA: 0

## 2018-01-25 NOTE — PROGRESS NOTES
Subjective:      Patient ID:  Santa Gibbs is a 28 y.o. female with chief complaint of:  Chief Complaint   Patient presents with    Vaginal Discharge     pt states that she is concerned her boyfriend may have cheated on her. she states she is having a lot of discharge and that she feels something is off.        27-year-old and a committed relationship however she has some doubts about the face fullness of her partner. Patient is also status post delivery and has noticed some changes in her vaginal discharge desired to come for STD check  She admits to older no itching no abnormal bleeding no pain        Past Medical History:   Diagnosis Date    Asthma     [facility] ([facility] [address]     Depression     Postpartum depression 2017     Past Surgical History:   Procedure Laterality Date    APPENDECTOMY  2016     SECTION N/A 2017     SECTION performed [facility]per [facility]    LAPAROSCOPIC APPENDECTOMY  2016    Dr Nanci Tomlin History   Problem Relation Age of Onset    Depression Mother     High Blood Pressure Father     Arthritis Maternal Grandmother     Cancer Maternal Grandmother     Diabetes Maternal Grandmother     Arthritis Paternal Grandmother     Diabetes Paternal Grandmother     Stroke Paternal Grandmother     Cancer Paternal Grandfather      Current Outpatient Prescriptions on File Prior to Visit   Medication Sig Dispense Refill    PATADAY 0.2 % SOLN ophthalmic solution   0    escitalopram (LEXAPRO) 10 MG tablet Take 1 tablet by mouth daily 30 tablet 3     Current Facility-Administered Medications on File Prior to Visit   Medication Dose Route Frequency Provider Last Rate Last Dose    levonorgestrel (MIRENA) IUD 52 mg 1 each  1 each Intrauterine Once Pastor Gasper MD   1 each at 10/12/17 0816     Allergies:  Geodon [ziprasidone hcl];  Onion; and Seasonal    Review of Systems   Constitutional: Negative for fatigue and fever. Respiratory: Negative for apnea and shortness of breath. Cardiovascular: Negative for chest pain and palpitations. Gastrointestinal: Negative for abdominal pain. Genitourinary: Positive for vaginal discharge. Negative for difficulty urinating, dyspareunia, dysuria, menstrual problem, pelvic pain and vaginal bleeding. Neurological: Negative for dizziness, weakness and light-headedness. Objective:   /66 (Site: Left Arm, Position: Sitting, Cuff Size: Large Adult)   Ht 5' 6\" (1.676 m)   Wt 190 lb (86.2 kg)   BMI 30.67 kg/m²     Physical Exam   Constitutional: She is oriented to person, place, and time. She appears well-developed and well-nourished. Eyes: Pupils are equal, round, and reactive to light. Cardiovascular: Normal rate, regular rhythm, normal heart sounds and intact distal pulses. Pulmonary/Chest: Effort normal.   Abdominal: Soft. Bowel sounds are normal.   Genitourinary: Uterus is not deviated, not enlarged, not fixed and not tender. Cervix exhibits no motion tenderness, no discharge and no friability. Right adnexum displays no mass, no tenderness and no fullness. Left adnexum displays no mass, no tenderness and no fullness. No erythema, tenderness or bleeding in the vagina. No foreign body in the vagina. No vaginal discharge found. Genitourinary Comments: There was some vaginal odor noted   Neurological: She is alert and oriented to person, place, and time. Psychiatric: She has a normal mood and affect. Assessment:      1. Vaginal discharge  Bacterial Vaginosis Panel    Candida DNA probe    Trichomonas Screen    C. Trachomatis / N. Gonorrhoeae, DNA    metroNIDAZOLE (FLAGYL) 500 MG tablet   2. Exposure to STD  Bacterial Vaginosis Panel    Candida DNA probe    Trichomonas Screen    C. Trachomatis / N.  Gonorrhoeae, DNA    metroNIDAZOLE (FLAGYL) 500 MG tablet         Plan:      Orders Placed This Encounter   Procedures    Bacterial Vaginosis Panel Standing Status:   Future     Standing Expiration Date:   1/24/2019    Candida DNA probe     Candida Vaginitis Panel-[facility] Test Code 560     Standing Status:   Future     Standing Expiration Date:   1/24/2019    Trichomonas Screen     Standing Status:   Future     Standing Expiration Date:   1/24/2019    C. Trachomatis / N. Gonorrhoeae, DNA     Standing Status:   Future     Standing Expiration Date:   1/24/2019     Orders Placed This Encounter   Medications    metroNIDAZOLE (FLAGYL) 500 MG tablet     Sig: Take 1 tablet by mouth 2 times daily for 7 days     Dispense:  14 tablet     Refill:  0    will await vaginal cultures but will treat with Flagyl at this time due to suspicion of bacterial vaginosis. Constipation also extensively on the use of condoms for STD prevention    Return if symptoms worsen or fail to improve.      [place], DO

## 2018-02-18 ENCOUNTER — APPOINTMENT (OUTPATIENT)
Dept: CT IMAGING | Age: 28
End: 2018-02-18
Payer: COMMERCIAL

## 2018-02-18 ENCOUNTER — HOSPITAL ENCOUNTER (EMERGENCY)
Age: 28
Discharge: HOME | End: 2018-02-18
Payer: COMMERCIAL

## 2018-02-18 VITALS
SYSTOLIC BLOOD PRESSURE: 113 MMHG | HEART RATE: 98 BPM | WEIGHT: 190 LBS | OXYGEN SATURATION: 99 % | BODY MASS INDEX: 30.53 KG/M2 | HEIGHT: 66 IN | DIASTOLIC BLOOD PRESSURE: 72 MMHG | TEMPERATURE: 98.4 F

## 2018-02-18 DIAGNOSIS — N30.01 ACUTE CYSTITIS WITH HEMATURIA: Primary | ICD-10-CM

## 2018-02-18 LAB
ALBUMIN SERPL-MCNC: 4.1 G/DL (ref 3.9–4.9)
ALP BLD-CCNC: 73 U/L (ref 40–130)
ALT SERPL-CCNC: 23 U/L (ref 0–33)
ANION GAP SERPL CALCULATED.3IONS-SCNC: 11 MEQ/L (ref 7–13)
AST SERPL-CCNC: 14 U/L (ref 0–35)
BACTERIA: ABNORMAL /HPF
BASOPHILS ABSOLUTE: 0 K/UL (ref 0–0.2)
BASOPHILS RELATIVE PERCENT: 0.8 %
BILIRUB SERPL-MCNC: 0.2 MG/DL (ref 0–1.2)
BILIRUBIN URINE: NEGATIVE
BLOOD, URINE: ABNORMAL
BUN BLDV-MCNC: 12 MG/DL (ref 6–20)
CALCIUM SERPL-MCNC: 9.4 MG/DL (ref 8.6–10.2)
CHLORIDE BLD-SCNC: 102 MEQ/L (ref 98–107)
CLARITY: ABNORMAL
CO2: 23 MEQ/L (ref 22–29)
COLOR: YELLOW
CREAT SERPL-MCNC: 0.51 MG/DL (ref 0.5–0.9)
EOSINOPHILS ABSOLUTE: 0.3 K/UL (ref 0–0.7)
EOSINOPHILS RELATIVE PERCENT: 4.4 %
EPITHELIAL CELLS, UA: ABNORMAL /HPF
GFR AFRICAN AMERICAN: >60
GFR NON-AFRICAN AMERICAN: >60
GLOBULIN: 2.6 G/DL (ref 2.3–3.5)
GLUCOSE BLD-MCNC: 88 MG/DL (ref 74–109)
GLUCOSE URINE: NEGATIVE MG/DL
HCT VFR BLD CALC: 39.1 % (ref 37–47)
HEMOGLOBIN: 13 G/DL (ref 12–16)
KETONES, URINE: NEGATIVE MG/DL
LEUKOCYTE ESTERASE, URINE: ABNORMAL
LYMPHOCYTES ABSOLUTE: 2.2 K/UL (ref 1–4.8)
LYMPHOCYTES RELATIVE PERCENT: 37.3 %
MCH RBC QN AUTO: 30.2 PG (ref 27–31.3)
MCHC RBC AUTO-ENTMCNC: 33.3 % (ref 33–37)
MCV RBC AUTO: 90.6 FL (ref 82–100)
MONOCYTES ABSOLUTE: 0.4 K/UL (ref 0.2–0.8)
MONOCYTES RELATIVE PERCENT: 6.1 %
NEUTROPHILS ABSOLUTE: 3 K/UL (ref 1.4–6.5)
NEUTROPHILS RELATIVE PERCENT: 51.4 %
NITRITE, URINE: NEGATIVE
PDW BLD-RTO: 13.5 % (ref 11.5–14.5)
PH UA: 5.5 (ref 5–9)
PLATELET # BLD: 207 K/UL (ref 130–400)
POTASSIUM SERPL-SCNC: 4.1 MEQ/L (ref 3.5–5.1)
PREGNANCY TEST URINE, POC: NEGATIVE
PROTEIN UA: ABNORMAL MG/DL
RBC # BLD: 4.31 M/UL (ref 4.2–5.4)
RBC UA: ABNORMAL /HPF (ref 0–2)
SODIUM BLD-SCNC: 136 MEQ/L (ref 132–144)
SPECIFIC GRAVITY UA: 1.02 (ref 1–1.03)
TOTAL PROTEIN: 6.7 G/DL (ref 6.4–8.1)
UROBILINOGEN, URINE: 0.2 E.U./DL
WBC # BLD: 5.8 K/UL (ref 4.8–10.8)
WBC UA: ABNORMAL /HPF (ref 0–5)

## 2018-02-18 PROCEDURE — 96375 TX/PRO/DX INJ NEW DRUG ADDON: CPT

## 2018-02-18 PROCEDURE — 96374 THER/PROPH/DIAG INJ IV PUSH: CPT

## 2018-02-18 PROCEDURE — 6360000002 HC RX W HCPCS: Performed by: PHYSICIAN ASSISTANT

## 2018-02-18 PROCEDURE — 85025 COMPLETE CBC W/AUTO DIFF WBC: CPT

## 2018-02-18 PROCEDURE — 6370000000 HC RX 637 (ALT 250 FOR IP): Performed by: PHYSICIAN ASSISTANT

## 2018-02-18 PROCEDURE — 74150 CT ABDOMEN W/O CONTRAST: CPT

## 2018-02-18 PROCEDURE — 81001 URINALYSIS AUTO W/SCOPE: CPT

## 2018-02-18 PROCEDURE — 99284 EMERGENCY DEPT VISIT MOD MDM: CPT

## 2018-02-18 PROCEDURE — 80053 COMPREHEN METABOLIC PANEL: CPT

## 2018-02-18 PROCEDURE — 36415 COLL VENOUS BLD VENIPUNCTURE: CPT

## 2018-02-18 RX ORDER — PHENAZOPYRIDINE HYDROCHLORIDE 200 MG/1
200 TABLET, FILM COATED ORAL 3 TIMES DAILY PRN
Qty: 6 TABLET | Refills: 0 | Status: SHIPPED | OUTPATIENT
Start: 2018-02-18 | End: 2018-02-21

## 2018-02-18 RX ORDER — KETOROLAC TROMETHAMINE 30 MG/ML
30 INJECTION, SOLUTION INTRAMUSCULAR; INTRAVENOUS ONCE
Status: COMPLETED | OUTPATIENT
Start: 2018-02-18 | End: 2018-02-18

## 2018-02-18 RX ORDER — NITROFURANTOIN 25; 75 MG/1; MG/1
100 CAPSULE ORAL 2 TIMES DAILY
Qty: 14 CAPSULE | Refills: 0 | Status: SHIPPED | OUTPATIENT
Start: 2018-02-18 | End: 2018-02-25

## 2018-02-18 RX ORDER — IBUPROFEN 800 MG/1
800 TABLET ORAL EVERY 8 HOURS PRN
Qty: 12 TABLET | Refills: 0 | Status: SHIPPED | OUTPATIENT
Start: 2018-02-18 | End: 2018-02-23 | Stop reason: ALTCHOICE

## 2018-02-18 RX ORDER — NITROFURANTOIN 25; 75 MG/1; MG/1
100 CAPSULE ORAL EVERY 12 HOURS SCHEDULED
Status: DISCONTINUED | OUTPATIENT
Start: 2018-02-18 | End: 2018-02-18

## 2018-02-18 RX ORDER — ONDANSETRON 2 MG/ML
4 INJECTION INTRAMUSCULAR; INTRAVENOUS ONCE
Status: COMPLETED | OUTPATIENT
Start: 2018-02-18 | End: 2018-02-18

## 2018-02-18 RX ORDER — NITROFURANTOIN 25; 75 MG/1; MG/1
100 CAPSULE ORAL ONCE
Status: COMPLETED | OUTPATIENT
Start: 2018-02-18 | End: 2018-02-18

## 2018-02-18 RX ADMIN — NITROFURANTOIN MONOHYDRATE/MACROCRYSTALLINE 100 MG: 25; 75 CAPSULE ORAL at 12:42

## 2018-02-18 RX ADMIN — ONDANSETRON HYDROCHLORIDE 4 MG: 2 INJECTION, SOLUTION INTRAMUSCULAR; INTRAVENOUS at 11:42

## 2018-02-18 RX ADMIN — KETOROLAC TROMETHAMINE 30 MG: 30 INJECTION, SOLUTION INTRAMUSCULAR at 11:42

## 2018-02-18 ASSESSMENT — ENCOUNTER SYMPTOMS
NAUSEA: 1
EYES NEGATIVE: 1
RESPIRATORY NEGATIVE: 1

## 2018-02-18 ASSESSMENT — PAIN SCALES - WONG BAKER: WONGBAKER_NUMERICALRESPONSE: 6

## 2018-02-18 ASSESSMENT — PAIN SCALES - GENERAL
PAINLEVEL_OUTOF10: 8
PAINLEVEL_OUTOF10: 6

## 2018-02-18 ASSESSMENT — PAIN DESCRIPTION - ORIENTATION: ORIENTATION: ANTERIOR

## 2018-02-18 ASSESSMENT — PAIN DESCRIPTION - FREQUENCY: FREQUENCY: CONTINUOUS

## 2018-02-18 ASSESSMENT — PAIN DESCRIPTION - DESCRIPTORS: DESCRIPTORS: ACHING

## 2018-02-18 ASSESSMENT — PAIN DESCRIPTION - LOCATION: LOCATION: ABDOMEN

## 2018-02-18 ASSESSMENT — PAIN DESCRIPTION - PAIN TYPE: TYPE: NEUROPATHIC PAIN

## 2018-02-18 NOTE — ED TRIAGE NOTES
Pt a/o x 3 skin pink w/d resp nonlabored. Pt states woke up today and when she sniffles from sinus issues her rt side hurts. Pt also reports coughing because of allergies. pt states

## 2018-02-18 NOTE — ED PROVIDER NOTES
[address] [facility] ED  eMERGENCY dEPARTMENT eNCOUnter      Pt Name: Tricia Thao  MRN: [id number]  [place] 1990  Date of evaluation: 2018  Provider: Keven Narayanan PA-C      HISTORY OF PRESENT ILLNESS    Tricia Thao is a 28 y.o. female who presents to the Emergency Department with Chief complaint of bilateral flank pain. Patient states the pain seems to hurt worse with cough. She denies any upper respiratory symptoms other than herself seasonal allergies that are no different today than usual.  She denies any fever or shortness of breath. Patient does admit to slight nausea. She feels like the pain does radiate around her flanks into her lower belly. She denies any burning with urination and has no other complaints at this time. REVIEW OF SYSTEMS       Review of Systems   Constitutional: Negative. HENT: Negative. Eyes: Negative. Respiratory: Negative. Cardiovascular: Negative. Gastrointestinal: Positive for nausea. Suprapubic pain     Endocrine: Negative. Genitourinary: Positive for flank pain. Skin: Negative. Neurological: Negative. Psychiatric/Behavioral: Negative. PAST MEDICAL HISTORY     Past Medical History:   Diagnosis Date    Asthma     B[facility] disorder ([facility] [address]     Depression     Postpartum depression 2017         SURGICAL HISTORY       Past Surgical History:   Procedure Laterality Date    APPENDECTOMY  2016     SECTION N/A 2017     SECTION performed [facility]ir [facility]    LAPAROSCOPIC APPENDECTOMY  2016    Dr Lennox Kehr       Previous Medications    ESCITALOPRAM (LEXAPRO) 10 MG TABLET    Take 1 tablet by mouth daily    PATADAY 0.2 % SOLN OPHTHALMIC SOLUTION           ALLERGIES     Geodon [ziprasidone hcl];  Onion; and Seasonal    FAMILY HISTORY       Family History   Problem Relation Age of Onset    Depression Mother     High

## 2018-02-23 ENCOUNTER — OFFICE VISIT (OUTPATIENT)
Dept: FAMILY MEDICINE CLINIC | Age: 28
End: 2018-02-23
Payer: COMMERCIAL

## 2018-02-23 VITALS
SYSTOLIC BLOOD PRESSURE: 106 MMHG | WEIGHT: 191 LBS | OXYGEN SATURATION: 98 % | RESPIRATION RATE: 14 BRPM | HEIGHT: 66 IN | TEMPERATURE: 98.8 F | DIASTOLIC BLOOD PRESSURE: 64 MMHG | BODY MASS INDEX: 30.7 KG/M2 | HEART RATE: 93 BPM

## 2018-02-23 DIAGNOSIS — F39 MOOD DISORDER (HCC): Chronic | ICD-10-CM

## 2018-02-23 DIAGNOSIS — F39 MOOD DISORDER (HCC): Primary | Chronic | ICD-10-CM

## 2018-02-23 DIAGNOSIS — Z23 FLU VACCINE NEED: ICD-10-CM

## 2018-02-23 DIAGNOSIS — N30.01 ACUTE CYSTITIS WITH HEMATURIA: ICD-10-CM

## 2018-02-23 LAB
BILIRUBIN, POC: NORMAL
BLOOD URINE, POC: NORMAL
CLARITY, POC: CLEAR
COLOR, POC: YELLOW
CREAT SERPL-MCNC: 0.57 MG/DL (ref 0.5–0.9)
GFR AFRICAN AMERICAN: >60
GFR NON-AFRICAN AMERICAN: >60
GLUCOSE URINE, POC: NORMAL
KETONES, POC: NORMAL
LEUKOCYTE EST, POC: NORMAL
LITHIUM LEVEL: <0.1 MEQ/L (ref 0.6–1.2)
NITRITE, POC: NORMAL
PH, POC: 6
PROTEIN, POC: NORMAL
SPECIFIC GRAVITY, POC: 1.03
TSH SERPL DL<=0.05 MIU/L-ACNC: 1.25 UIU/ML (ref 0.27–4.2)
UROBILINOGEN, POC: NORMAL

## 2018-02-23 PROCEDURE — 81002 URINALYSIS NONAUTO W/O SCOPE: CPT | Performed by: FAMILY MEDICINE

## 2018-02-23 PROCEDURE — 1036F TOBACCO NON-USER: CPT | Performed by: FAMILY MEDICINE

## 2018-02-23 PROCEDURE — G8427 DOCREV CUR MEDS BY ELIG CLIN: HCPCS | Performed by: FAMILY MEDICINE

## 2018-02-23 PROCEDURE — 99204 OFFICE O/P NEW MOD 45 MIN: CPT | Performed by: FAMILY MEDICINE

## 2018-02-23 PROCEDURE — 90471 IMMUNIZATION ADMIN: CPT | Performed by: FAMILY MEDICINE

## 2018-02-23 PROCEDURE — G8417 CALC BMI ABV UP PARAM F/U: HCPCS | Performed by: FAMILY MEDICINE

## 2018-02-23 PROCEDURE — 90688 IIV4 VACCINE SPLT 0.5 ML IM: CPT | Performed by: FAMILY MEDICINE

## 2018-02-23 PROCEDURE — G8484 FLU IMMUNIZE NO ADMIN: HCPCS | Performed by: FAMILY MEDICINE

## 2018-02-23 RX ORDER — LITHIUM CARBONATE 300 MG
300 TABLET ORAL DAILY
Qty: 30 TABLET | Refills: 0 | Status: SHIPPED | OUTPATIENT
Start: 2018-02-23 | End: 2018-03-12 | Stop reason: DRUGHIGH

## 2018-02-23 ASSESSMENT — ENCOUNTER SYMPTOMS
ABDOMINAL PAIN: 1
SHORTNESS OF BREATH: 0
COUGH: 0
CHEST TIGHTNESS: 0
BLOOD IN STOOL: 0
DIARRHEA: 0
CONSTIPATION: 0
NAUSEA: 1
VOMITING: 0
SORE THROAT: 0

## 2018-02-23 ASSESSMENT — PATIENT HEALTH QUESTIONNAIRE - PHQ9
SUM OF ALL RESPONSES TO PHQ QUESTIONS 1-9: 14
9. THOUGHTS THAT YOU WOULD BE BETTER OFF DEAD, OR OF HURTING YOURSELF: 0
5. POOR APPETITE OR OVEREATING: 2
4. FEELING TIRED OR HAVING LITTLE ENERGY: 3
1. LITTLE INTEREST OR PLEASURE IN DOING THINGS: 1
10. IF YOU CHECKED OFF ANY PROBLEMS, HOW DIFFICULT HAVE THESE PROBLEMS MADE IT FOR YOU TO DO YOUR WORK, TAKE CARE OF THINGS AT HOME, OR GET ALONG WITH OTHER PEOPLE: 1
7. TROUBLE CONCENTRATING ON THINGS, SUCH AS READING THE NEWSPAPER OR WATCHING TELEVISION: 1
6. FEELING BAD ABOUT YOURSELF - OR THAT YOU ARE A FAILURE OR HAVE LET YOURSELF OR YOUR FAMILY DOWN: 3
8. MOVING OR SPEAKING SO SLOWLY THAT OTHER PEOPLE COULD HAVE NOTICED. OR THE OPPOSITE, BEING SO FIGETY OR RESTLESS THAT YOU HAVE BEEN MOVING AROUND A LOT MORE THAN USUAL: 0
2. FEELING DOWN, DEPRESSED OR HOPELESS: 3
3. TROUBLE FALLING OR STAYING ASLEEP: 1
SUM OF ALL RESPONSES TO PHQ9 QUESTIONS 1 & 2: 4

## 2018-02-23 NOTE — PROGRESS NOTES
Subjective  Lore Malagon, 28 y.o. female presents today with:  Chief Complaint   Patient presents with    Urinary Tract Infection     Patient state that she is here as a ER follow up for a UTI. This is a new patient to me. I have reviewed the past medical and surgical history, social history and family history provided. I have reviewed  medication, previous testing and working diagnoses. I have reviewed the allergies and health maintenance information and correlated it into my decision making for this patient for care, diagnostics, consultations and treatment for today's visit. Was seen in ER on Sunday complaining of bilateral side pain which increased when she took a deep breath. Her urine was significant for RBCs and WBCs but micro was unremarkable for bacteria. And UA was neg for nitrite. Culture was not done. Patient was placed on nitrofurantoin BID for 7 days. Complains of bilateral flank pain which is 75% better than it had been. Depression screen +: Has had depression her whole life. Has been diagnosed with BPD. Attempted suicide while on Geodon. Currently feels fatigued, has altered eating habits; has low self-esteem; difficulty concentrating and Has passive thoughts of death. No hallucinations unless she takes Benadryl. Patient also relates significant impulsivity, angers quickly, rejects people who upset her. States that she is somewhat bonded to her 11 month old and that she will not allow him to go to  because she doesn't trust them. Her father will be helping to care for him when he retires. The FOB is no longer in the picture. The last med she was on was Lexapro and it didn't work. Celexa didn't help but she had no side effects. Has tried Effexor (couldn't sleep), Lamictal (made her shakey), Prozac, Paxil, Risperdal (weight gain), Depakote (pills were too big, had to stop sprinkles), Abilify (petit mal seizures), Zoloft (stomach ache, diarrhea, nausea). Arthritis Maternal Grandmother     Cancer Maternal Grandmother     Diabetes Maternal Grandmother     Arthritis Paternal Grandmother     Diabetes Paternal Grandmother     Stroke Paternal Grandmother     Cancer Paternal Grandfather      Allergies   Allergen Reactions    Geodon [Ziprasidone Hcl]     Onion     Seasonal Other (See Comments)     Current Outpatient Prescriptions   Medication Sig Dispense Refill    VENTOLIN  (90 Base) MCG/ACT inhaler inhale 2 puffs by mouth four times a day if needed  0    lithium 300 MG tablet Take 1 tablet by mouth daily 30 tablet 0    nitrofurantoin, macrocrystal-monohydrate, (MACROBID) 100 MG capsule Take 1 capsule by mouth 2 times daily for 7 days 14 capsule 0    escitalopram (LEXAPRO) 10 MG tablet Take 1 tablet by mouth daily 30 tablet 3     Current Facility-Administered Medications   Medication Dose Route Frequency Provider Last Rate Last Dose    levonorgestrel (MIRENA) IUD 52 mg 1 each  1 each Intrauterine Once Arminda Caballero MD   1 each at 10/12/17 0816     PMH, Surgical Hx, Family Hx, and Social Hx reviewed and updated. Health Maintenance reviewed. Objective    Vitals:    02/23/18 1435   BP: 106/64   Site: Left Arm   Position: Sitting   Cuff Size: Large Adult   Pulse: 93   Resp: 14   Temp: 98.8 °F (37.1 °C)   TempSrc: Temporal   SpO2: 98%   Weight: 191 lb (86.6 kg)   Height: 5' 6\" (1.676 m)       Physical Exam   Constitutional: She is oriented to person, place, and time. She appears well-developed and well-nourished. HENT:   Head: Normocephalic and atraumatic. Eyes: Conjunctivae are normal. No scleral icterus. Cardiovascular: Normal rate, regular rhythm and normal heart sounds. Pulmonary/Chest: Effort normal and breath sounds normal.   Abdominal: Soft. Bowel sounds are normal. She exhibits no distension and no mass. There is no tenderness. Musculoskeletal: She exhibits no edema.    Neurological: She is alert and oriented to person, place, and Date:   4/23/2018    POCT Urinalysis no Micro     Orders Placed This Encounter   Medications    lithium 300 MG tablet     Sig: Take 1 tablet by mouth daily     Dispense:  30 tablet     Refill:  0     Medications Discontinued During This Encounter   Medication Reason    PATADAY 0.2 % SOLN ophthalmic solution Therapy completed    ibuprofen (ADVIL;MOTRIN) 800 MG tablet Therapy completed     Return in about 3 weeks (around 3/16/2018).         Controlled Substances Monitoring:                                Bobby Redmond MD

## 2018-03-12 ENCOUNTER — OFFICE VISIT (OUTPATIENT)
Dept: FAMILY MEDICINE CLINIC | Age: 28
End: 2018-03-12
Payer: COMMERCIAL

## 2018-03-12 VITALS
HEIGHT: 66 IN | DIASTOLIC BLOOD PRESSURE: 64 MMHG | RESPIRATION RATE: 14 BRPM | TEMPERATURE: 99.2 F | HEART RATE: 102 BPM | BODY MASS INDEX: 30.7 KG/M2 | WEIGHT: 191 LBS | SYSTOLIC BLOOD PRESSURE: 106 MMHG | OXYGEN SATURATION: 98 %

## 2018-03-12 DIAGNOSIS — Z79.899 HIGH RISK MEDICATION USE: Chronic | ICD-10-CM

## 2018-03-12 DIAGNOSIS — F39 MOOD DISORDER (HCC): Chronic | ICD-10-CM

## 2018-03-12 DIAGNOSIS — F39 MOOD DISORDER (HCC): Primary | Chronic | ICD-10-CM

## 2018-03-12 DIAGNOSIS — G43.909 MIGRAINE WITHOUT STATUS MIGRAINOSUS, NOT INTRACTABLE, UNSPECIFIED MIGRAINE TYPE: ICD-10-CM

## 2018-03-12 PROBLEM — O47.03 FALSE LABOR BEFORE 37 COMPLETED WEEKS OF GESTATION IN THIRD TRIMESTER: Status: RESOLVED | Noted: 2017-08-12 | Resolved: 2018-03-12

## 2018-03-12 PROBLEM — Z30.430 ENCOUNTER FOR INSERTION OF MIRENA IUD: Status: RESOLVED | Noted: 2017-10-25 | Resolved: 2018-03-12

## 2018-03-12 PROBLEM — N92.6 IRREGULAR MENSES: Status: RESOLVED | Noted: 2017-09-16 | Resolved: 2018-03-12

## 2018-03-12 LAB
CREAT SERPL-MCNC: 0.59 MG/DL (ref 0.5–0.9)
GFR AFRICAN AMERICAN: >60
GFR NON-AFRICAN AMERICAN: >60
LITHIUM LEVEL: 0.4 MEQ/L (ref 0.6–1.2)
TSH SERPL DL<=0.05 MIU/L-ACNC: 1.44 UIU/ML (ref 0.27–4.2)

## 2018-03-12 PROCEDURE — 1036F TOBACCO NON-USER: CPT | Performed by: FAMILY MEDICINE

## 2018-03-12 PROCEDURE — G8427 DOCREV CUR MEDS BY ELIG CLIN: HCPCS | Performed by: FAMILY MEDICINE

## 2018-03-12 PROCEDURE — 99213 OFFICE O/P EST LOW 20 MIN: CPT | Performed by: FAMILY MEDICINE

## 2018-03-12 PROCEDURE — G8417 CALC BMI ABV UP PARAM F/U: HCPCS | Performed by: FAMILY MEDICINE

## 2018-03-12 PROCEDURE — G8482 FLU IMMUNIZE ORDER/ADMIN: HCPCS | Performed by: FAMILY MEDICINE

## 2018-03-12 RX ORDER — AZELASTINE HYDROCHLORIDE 137 UG/1
SPRAY, METERED NASAL
Refills: 0 | COMMUNITY
Start: 2018-02-23 | End: 2019-07-09 | Stop reason: ALTCHOICE

## 2018-03-12 RX ORDER — LITHIUM CARBONATE 300 MG/1
600 TABLET, FILM COATED, EXTENDED RELEASE ORAL NIGHTLY
Qty: 60 TABLET | Refills: 1 | Status: SHIPPED | OUTPATIENT
Start: 2018-03-12 | End: 2018-04-10 | Stop reason: ALTCHOICE

## 2018-03-12 RX ORDER — FLUTICASONE PROPIONATE 50 MCG
SPRAY, SUSPENSION (ML) NASAL
Refills: 0 | COMMUNITY
Start: 2018-02-23 | End: 2019-08-21 | Stop reason: ALTCHOICE

## 2018-03-12 RX ORDER — PROPRANOLOL HYDROCHLORIDE 80 MG/1
80 CAPSULE, EXTENDED RELEASE ORAL DAILY
Qty: 30 CAPSULE | Refills: 5 | Status: SHIPPED | OUTPATIENT
Start: 2018-03-12 | End: 2019-08-21

## 2018-03-12 NOTE — PROGRESS NOTES
 Arthritis Maternal Grandmother     Cancer Maternal Grandmother     Diabetes Maternal Grandmother     Arthritis Paternal Grandmother     Diabetes Paternal Grandmother     Stroke Paternal Grandmother     Cancer Paternal Grandfather      Allergies   Allergen Reactions    Geodon [Ziprasidone Hcl]     Onion     Seasonal Other (See Comments)     Current Outpatient Prescriptions   Medication Sig Dispense Refill    Azelastine HCl 137 MCG/SPRAY SOLN instill 2 sprays into each nostril twice a day  0    fluticasone (FLONASE) 50 MCG/ACT nasal spray instill 2 spray into each nostril twice a day  0    lithium (LITHOBID) 300 MG extended release tablet Take 2 tablets by mouth nightly 60 tablet 1    propranolol (INDERAL LA) 80 MG extended release capsule Take 1 capsule by mouth daily 30 capsule 5    VENTOLIN  (90 Base) MCG/ACT inhaler inhale 2 puffs by mouth four times a day if needed  0     Current Facility-Administered Medications   Medication Dose Route Frequency Provider Last Rate Last Dose    levonorgestrel (MIRENA) IUD 52 mg 1 each  1 each Intrauterine Once Darlyn Palm MD   1 each at 10/12/17 0816     PMH, Surgical Hx, Family Hx, and Social Hx reviewed and updated. Health Maintenance reviewed. Objective    Vitals:    03/12/18 1256   BP: 106/64   Site: Left Arm   Position: Sitting   Cuff Size: Large Adult   Pulse: 102   Resp: 14   Temp: 99.2 °F (37.3 °C)   TempSrc: Temporal   SpO2: 98%   Weight: 191 lb (86.6 kg)   Height: 5' 6\" (1.676 m)       Physical Exam   Constitutional: She is oriented to person, place, and time. She appears well-developed and well-nourished. No distress. HENT:   Head: Normocephalic and atraumatic. Eyes: Conjunctivae are normal.   Cardiovascular: Normal rate, regular rhythm and normal heart sounds. Pulmonary/Chest: No respiratory distress. She has no wheezes. She has no rales. Neurological: She is alert and oriented to person, place, and time.  No cranial nerve deficit or sensory deficit. She exhibits normal muscle tone. She displays a negative Romberg sign. Coordination and gait normal.   Skin: Skin is warm and dry. Psychiatric: She has a normal mood and affect. Assessment & Plan   1. Mood disorder (HCC)  lithium (LITHOBID) 300 MG extended release tablet   2. Migraine without status migrainosus, not intractable, unspecified migraine type  propranolol (INDERAL LA) 80 MG extended release capsule   3. High risk medication use       Increase Lithium Carb ER to 600 mg at night. CHeck levels now and in 2 weeks. Check TSH and creatinine as well. Patient reluctantly agrees to find out about [address] resources given lengthy h/o BPD/PD. Add propranol ER for migraine variant and APAP OTC per package instructions. Avoid NSAIDs. Reviewed with the patient: current clinical status, medications, activities and diet.      Side effects, adverse effects of the medication prescribed today, as well as treatment plan/ rationale and result expectations have been discussed with the patient who expresses understanding and desires to proceed.     Close follow up to evaluate treatment results and for coordination of care. I have reviewed the patient's medical history in detail and updated the computerized patient record. No orders of the defined types were placed in this encounter. Orders Placed This Encounter   Medications    lithium (LITHOBID) 300 MG extended release tablet     Sig: Take 2 tablets by mouth nightly     Dispense:  60 tablet     Refill:  1    propranolol (INDERAL LA) 80 MG extended release capsule     Sig: Take 1 capsule by mouth daily     Dispense:  30 capsule     Refill:  5     Medications Discontinued During This Encounter   Medication Reason    escitalopram (LEXAPRO) 10 MG tablet Therapy completed    lithium 300 MG tablet Dose adjustment     Return in about 2 weeks (around 3/26/2018).         Controlled Substances Monitoring: Wilrfido Abrams MD

## 2018-03-26 ENCOUNTER — OFFICE VISIT (OUTPATIENT)
Dept: FAMILY MEDICINE CLINIC | Age: 28
End: 2018-03-26
Payer: COMMERCIAL

## 2018-03-26 VITALS
DIASTOLIC BLOOD PRESSURE: 68 MMHG | SYSTOLIC BLOOD PRESSURE: 116 MMHG | HEIGHT: 66 IN | WEIGHT: 193 LBS | OXYGEN SATURATION: 98 % | HEART RATE: 68 BPM | RESPIRATION RATE: 14 BRPM | TEMPERATURE: 97.1 F | BODY MASS INDEX: 31.02 KG/M2

## 2018-03-26 DIAGNOSIS — Z79.899 HIGH RISK MEDICATION USE: Chronic | ICD-10-CM

## 2018-03-26 DIAGNOSIS — F39 MOOD DISORDER (HCC): Primary | Chronic | ICD-10-CM

## 2018-03-26 DIAGNOSIS — F39 MOOD DISORDER (HCC): Chronic | ICD-10-CM

## 2018-03-26 LAB — LITHIUM LEVEL: 0.6 MEQ/L (ref 0.6–1.2)

## 2018-03-26 PROCEDURE — G8427 DOCREV CUR MEDS BY ELIG CLIN: HCPCS | Performed by: FAMILY MEDICINE

## 2018-03-26 PROCEDURE — G8482 FLU IMMUNIZE ORDER/ADMIN: HCPCS | Performed by: FAMILY MEDICINE

## 2018-03-26 PROCEDURE — 1036F TOBACCO NON-USER: CPT | Performed by: FAMILY MEDICINE

## 2018-03-26 PROCEDURE — 99213 OFFICE O/P EST LOW 20 MIN: CPT | Performed by: FAMILY MEDICINE

## 2018-03-26 PROCEDURE — G8417 CALC BMI ABV UP PARAM F/U: HCPCS | Performed by: FAMILY MEDICINE

## 2018-03-26 NOTE — PROGRESS NOTES
nasal spray instill 2 spray into each nostril twice a day  0    lithium (LITHOBID) 300 MG extended release tablet Take 2 tablets by mouth nightly 60 tablet 1    propranolol (INDERAL LA) 80 MG extended release capsule Take 1 capsule by mouth daily 30 capsule 5    VENTOLIN  (90 Base) MCG/ACT inhaler inhale 2 puffs by mouth four times a day if needed  0     Current Facility-Administered Medications   Medication Dose Route Frequency Provider Last Rate Last Dose    levonorgestrel (MIRENA) IUD 52 mg 1 each  1 each Intrauterine Once Ramón Grant MD   1 each at 10/12/17 0816     PMH, Surgical Hx, Family Hx, and Social Hx reviewed and updated. Health Maintenance reviewed. Objective    Vitals:    03/26/18 1132   BP: 116/68   Site: Left Arm   Position: Sitting   Cuff Size: Medium Adult   Pulse: 68   Resp: 14   Temp: 97.1 °F (36.2 °C)   TempSrc: Temporal   SpO2: 98%   Weight: 193 lb (87.5 kg)   Height: 5' 6\" (1.676 m)       Physical Exam   Constitutional: She is oriented to person, place, and time. She appears well-developed and well-nourished. HENT:   Head: Normocephalic. Eyes: Conjunctivae are normal.   Pulmonary/Chest: Effort normal.   Neurological: She is alert and oriented to person, place, and time. Psychiatric: She has a normal mood and affect. Her behavior is normal. Judgment and thought content normal.       Assessment & Plan   1. Mood disorder (HCC)  Lithium Level   2. High risk medication use  Lithium Level     Continue lithium. Reassess in 2 weeks. VCheck levels now. Reviewed with the patient: current clinical status, medications, activities and diet.      Side effects, adverse effects of the medication prescribed today, as well as treatment plan/ rationale and result expectations have been discussed with the patient who expresses understanding and desires to proceed.     Close follow up to evaluate treatment results and for coordination of care.   I have reviewed the patient's medical

## 2018-04-10 ENCOUNTER — OFFICE VISIT (OUTPATIENT)
Dept: FAMILY MEDICINE CLINIC | Age: 28
End: 2018-04-10
Payer: COMMERCIAL

## 2018-04-10 VITALS
SYSTOLIC BLOOD PRESSURE: 116 MMHG | HEART RATE: 78 BPM | RESPIRATION RATE: 12 BRPM | HEIGHT: 66 IN | BODY MASS INDEX: 31.02 KG/M2 | DIASTOLIC BLOOD PRESSURE: 66 MMHG | OXYGEN SATURATION: 98 % | WEIGHT: 193 LBS | TEMPERATURE: 98.7 F

## 2018-04-10 DIAGNOSIS — F32.9 MAJOR DEPRESSION, CHRONIC: Primary | Chronic | ICD-10-CM

## 2018-04-10 DIAGNOSIS — B37.31 VAGINAL CANDIDIASIS: ICD-10-CM

## 2018-04-10 PROCEDURE — G8417 CALC BMI ABV UP PARAM F/U: HCPCS | Performed by: FAMILY MEDICINE

## 2018-04-10 PROCEDURE — 99214 OFFICE O/P EST MOD 30 MIN: CPT | Performed by: FAMILY MEDICINE

## 2018-04-10 PROCEDURE — 1036F TOBACCO NON-USER: CPT | Performed by: FAMILY MEDICINE

## 2018-04-10 PROCEDURE — G8427 DOCREV CUR MEDS BY ELIG CLIN: HCPCS | Performed by: FAMILY MEDICINE

## 2018-04-10 RX ORDER — OLOPATADINE HCL 0.2 %
DROPS OPHTHALMIC (EYE)
Refills: 0 | COMMUNITY
Start: 2018-03-26 | End: 2019-08-21

## 2018-04-10 RX ORDER — FLUCONAZOLE 150 MG/1
TABLET ORAL
Qty: 2 TABLET | Refills: 1 | Status: SHIPPED | OUTPATIENT
Start: 2018-04-10 | End: 2018-04-11

## 2018-04-10 RX ORDER — DULOXETIN HYDROCHLORIDE 60 MG/1
60 CAPSULE, DELAYED RELEASE ORAL DAILY
Qty: 30 CAPSULE | Refills: 5 | Status: SHIPPED | OUTPATIENT
Start: 2018-04-10 | End: 2018-05-11 | Stop reason: DRUGHIGH

## 2018-04-18 ENCOUNTER — TELEPHONE (OUTPATIENT)
Dept: FAMILY MEDICINE CLINIC | Age: 28
End: 2018-04-18

## 2018-04-18 DIAGNOSIS — F39 MOOD DISORDER (HCC): Primary | Chronic | ICD-10-CM

## 2018-04-18 RX ORDER — DULOXETIN HYDROCHLORIDE 30 MG/1
30 CAPSULE, DELAYED RELEASE ORAL DAILY
Qty: 30 CAPSULE | Refills: 3 | Status: SHIPPED | OUTPATIENT
Start: 2018-04-18 | End: 2019-08-21

## 2018-05-08 ENCOUNTER — OFFICE VISIT (OUTPATIENT)
Dept: OBGYN CLINIC | Age: 28
End: 2018-05-08
Payer: COMMERCIAL

## 2018-05-08 VITALS — DIASTOLIC BLOOD PRESSURE: 88 MMHG | HEIGHT: 66 IN | SYSTOLIC BLOOD PRESSURE: 134 MMHG | HEART RATE: 64 BPM

## 2018-05-08 DIAGNOSIS — Z30.432 ENCOUNTER FOR IUD REMOVAL: ICD-10-CM

## 2018-05-08 DIAGNOSIS — N92.6 IRREGULAR MENSES: ICD-10-CM

## 2018-05-08 DIAGNOSIS — N89.8 VAGINAL DISCHARGE: Primary | ICD-10-CM

## 2018-05-08 PROCEDURE — 58301 REMOVE INTRAUTERINE DEVICE: CPT | Performed by: OBSTETRICS & GYNECOLOGY

## 2018-05-08 PROCEDURE — 99999 PR OFFICE/OUTPT VISIT,PROCEDURE ONLY: CPT | Performed by: OBSTETRICS & GYNECOLOGY

## 2018-05-08 RX ORDER — METRONIDAZOLE 500 MG/1
500 TABLET ORAL 2 TIMES DAILY
Qty: 14 TABLET | Refills: 0 | Status: SHIPPED | OUTPATIENT
Start: 2018-05-08 | End: 2018-05-15

## 2018-05-08 RX ORDER — LEVONORGESTREL AND ETHINYL ESTRADIOL 0.15-0.03
1 KIT ORAL DAILY
Qty: 1 PACKET | Refills: 11 | Status: SHIPPED | OUTPATIENT
Start: 2018-05-08 | End: 2018-09-24 | Stop reason: SDUPTHER

## 2018-05-08 RX ORDER — FLUCONAZOLE 150 MG/1
TABLET ORAL
Qty: 3 TABLET | Refills: 0 | Status: SHIPPED | OUTPATIENT
Start: 2018-05-08 | End: 2018-05-11 | Stop reason: ALTCHOICE

## 2018-05-11 ENCOUNTER — OFFICE VISIT (OUTPATIENT)
Dept: FAMILY MEDICINE CLINIC | Age: 28
End: 2018-05-11
Payer: COMMERCIAL

## 2018-05-11 VITALS
HEART RATE: 75 BPM | TEMPERATURE: 98.5 F | WEIGHT: 182.38 LBS | RESPIRATION RATE: 14 BRPM | OXYGEN SATURATION: 98 % | BODY MASS INDEX: 29.44 KG/M2 | SYSTOLIC BLOOD PRESSURE: 116 MMHG | DIASTOLIC BLOOD PRESSURE: 78 MMHG

## 2018-05-11 PROCEDURE — 99214 OFFICE O/P EST MOD 30 MIN: CPT | Performed by: FAMILY MEDICINE

## 2018-05-11 PROCEDURE — 1036F TOBACCO NON-USER: CPT | Performed by: FAMILY MEDICINE

## 2018-05-11 PROCEDURE — G8427 DOCREV CUR MEDS BY ELIG CLIN: HCPCS | Performed by: FAMILY MEDICINE

## 2018-05-11 PROCEDURE — G8417 CALC BMI ABV UP PARAM F/U: HCPCS | Performed by: FAMILY MEDICINE

## 2018-07-13 ENCOUNTER — TELEPHONE (OUTPATIENT)
Dept: FAMILY MEDICINE CLINIC | Age: 28
End: 2018-07-13

## 2018-08-17 RX ORDER — METRONIDAZOLE 500 MG/1
500 TABLET ORAL 2 TIMES DAILY
Qty: 14 TABLET | Refills: 0 | Status: SHIPPED | OUTPATIENT
Start: 2018-08-17 | End: 2018-08-24

## 2018-09-24 NOTE — TELEPHONE ENCOUNTER
From: Livan Jacobo  Sent: 9/24/2018 9:25 AM EDT  Subject: Medication Renewal Request    Aníbal Grissom would like a refill of the following medications:     levonorgestrel-ethinyl estradiol (NORDETTE) 0.15-30 MG-MCG per tablet Belle Hudson MD]   Patient Comment: my pharmacy has been messing my refills up the past two months. not sure if i should get a different pill or a different pharmacy since it is messing with my cycle. they say they wont refill until the 27th and i only have 1 pill left.  last month they were a week late and completely switched the brand of pill on me and i bled for 2 weeks because of it    Preferred pharmacy: [facility] , [address] [phone] -  [phone]

## 2018-09-25 ENCOUNTER — PATIENT MESSAGE (OUTPATIENT)
Dept: OBGYN CLINIC | Age: 28
End: 2018-09-25

## 2018-09-25 RX ORDER — LEVONORGESTREL AND ETHINYL ESTRADIOL 0.15-0.03
1 KIT ORAL DAILY
Qty: 1 PACKET | Refills: 11 | Status: SHIPPED | OUTPATIENT
Start: 2018-09-25 | End: 2019-08-21

## 2018-09-28 RX ORDER — LEVONORGESTREL AND ETHINYL ESTRADIOL 0.15-0.03
1 KIT ORAL DAILY
Qty: 1 PACKET | Refills: 11 | Status: SHIPPED | OUTPATIENT
Start: 2018-09-28 | End: 2018-09-28 | Stop reason: SDUPTHER

## 2018-10-02 RX ORDER — LEVONORGESTREL AND ETHINYL ESTRADIOL 0.15-0.03
1 KIT ORAL DAILY
Qty: 1 PACKET | Refills: 11 | Status: SHIPPED | OUTPATIENT
Start: 2018-10-02 | End: 2019-06-11

## 2019-05-09 ENCOUNTER — OFFICE VISIT (OUTPATIENT)
Dept: FAMILY MEDICINE CLINIC | Age: 29
End: 2019-05-09
Payer: COMMERCIAL

## 2019-05-09 VITALS
RESPIRATION RATE: 12 BRPM | HEIGHT: 66 IN | HEART RATE: 90 BPM | DIASTOLIC BLOOD PRESSURE: 76 MMHG | TEMPERATURE: 98 F | OXYGEN SATURATION: 98 % | WEIGHT: 200.2 LBS | SYSTOLIC BLOOD PRESSURE: 112 MMHG | BODY MASS INDEX: 32.17 KG/M2

## 2019-05-09 DIAGNOSIS — M25.50 ARTHRALGIA, UNSPECIFIED JOINT: Primary | ICD-10-CM

## 2019-05-09 DIAGNOSIS — E66.09 CLASS 1 OBESITY DUE TO EXCESS CALORIES WITHOUT SERIOUS COMORBIDITY WITH BODY MASS INDEX (BMI) OF 32.0 TO 32.9 IN ADULT: ICD-10-CM

## 2019-05-09 DIAGNOSIS — M25.50 ARTHRALGIA, UNSPECIFIED JOINT: ICD-10-CM

## 2019-05-09 DIAGNOSIS — M79.10 MUSCLE PAIN: ICD-10-CM

## 2019-05-09 LAB
ALBUMIN SERPL-MCNC: 4.2 G/DL (ref 3.5–4.6)
ALP BLD-CCNC: 79 U/L (ref 40–130)
ALT SERPL-CCNC: 25 U/L (ref 0–33)
ANION GAP SERPL CALCULATED.3IONS-SCNC: 17 MEQ/L (ref 9–15)
AST SERPL-CCNC: 17 U/L (ref 0–35)
BASOPHILS ABSOLUTE: 0 K/UL (ref 0–0.2)
BASOPHILS RELATIVE PERCENT: 0.4 %
BILIRUB SERPL-MCNC: <0.2 MG/DL (ref 0.2–0.7)
BUN BLDV-MCNC: 13 MG/DL (ref 6–20)
CALCIUM SERPL-MCNC: 9 MG/DL (ref 8.5–9.9)
CHLORIDE BLD-SCNC: 101 MEQ/L (ref 95–107)
CHOLESTEROL, TOTAL: 169 MG/DL (ref 0–199)
CO2: 21 MEQ/L (ref 20–31)
CREAT SERPL-MCNC: 0.59 MG/DL (ref 0.5–0.9)
EOSINOPHILS ABSOLUTE: 0.1 K/UL (ref 0–0.7)
EOSINOPHILS RELATIVE PERCENT: 1.2 %
GFR AFRICAN AMERICAN: >60
GFR NON-AFRICAN AMERICAN: >60
GLOBULIN: 3.1 G/DL (ref 2.3–3.5)
GLUCOSE BLD-MCNC: 90 MG/DL (ref 70–99)
HBA1C MFR BLD: 5.1 % (ref 4.8–5.9)
HCG QUALITATIVE: NEGATIVE
HCT VFR BLD CALC: 38.7 % (ref 37–47)
HDLC SERPL-MCNC: 43 MG/DL (ref 40–59)
HEMOGLOBIN: 13.2 G/DL (ref 12–16)
LDL CHOLESTEROL CALCULATED: 107 MG/DL (ref 0–129)
LYMPHOCYTES ABSOLUTE: 2.3 K/UL (ref 1–4.8)
LYMPHOCYTES RELATIVE PERCENT: 20.2 %
MCH RBC QN AUTO: 31.1 PG (ref 27–31.3)
MCHC RBC AUTO-ENTMCNC: 34 % (ref 33–37)
MCV RBC AUTO: 91.4 FL (ref 82–100)
MONOCYTES ABSOLUTE: 0.5 K/UL (ref 0.2–0.8)
MONOCYTES RELATIVE PERCENT: 4.4 %
NEUTROPHILS ABSOLUTE: 8.3 K/UL (ref 1.4–6.5)
NEUTROPHILS RELATIVE PERCENT: 73.8 %
PDW BLD-RTO: 12.8 % (ref 11.5–14.5)
PLATELET # BLD: 255 K/UL (ref 130–400)
POTASSIUM SERPL-SCNC: 3.9 MEQ/L (ref 3.4–4.9)
RBC # BLD: 4.24 M/UL (ref 4.2–5.4)
RHEUMATOID FACTOR: <10 IU/ML (ref 0–14)
SODIUM BLD-SCNC: 139 MEQ/L (ref 135–144)
TOTAL CK: 54 U/L (ref 0–170)
TOTAL PROTEIN: 7.3 G/DL (ref 6.3–8)
TRIGL SERPL-MCNC: 94 MG/DL (ref 0–150)
TSH SERPL DL<=0.05 MIU/L-ACNC: 1.08 UIU/ML (ref 0.44–3.86)
VITAMIN D 25-HYDROXY: 19.5 NG/ML (ref 30–100)
WBC # BLD: 11.3 K/UL (ref 4.8–10.8)

## 2019-05-09 PROCEDURE — 1036F TOBACCO NON-USER: CPT | Performed by: INTERNAL MEDICINE

## 2019-05-09 PROCEDURE — 99214 OFFICE O/P EST MOD 30 MIN: CPT | Performed by: INTERNAL MEDICINE

## 2019-05-09 PROCEDURE — G8417 CALC BMI ABV UP PARAM F/U: HCPCS | Performed by: INTERNAL MEDICINE

## 2019-05-09 PROCEDURE — G8427 DOCREV CUR MEDS BY ELIG CLIN: HCPCS | Performed by: INTERNAL MEDICINE

## 2019-05-09 ASSESSMENT — ENCOUNTER SYMPTOMS
EYE PAIN: 0
SHORTNESS OF BREATH: 0
ABDOMINAL PAIN: 0
BACK PAIN: 0

## 2019-05-09 NOTE — PROGRESS NOTES
Subjective:      Patient ID: Clnit Conway is a 33 y.o. female who presents today with:  Chief Complaint   Patient presents with   [address] pcp- Cyn Conrad.  Pain     Pt complaining of joint pain all over body. HPI    She is here for new to doctor. She gets shoulder and neck pain, finger pain. Pain is not worse in the morning. Worse after working. Multiple family members have osteoporosis and arthritis. No known FH of RA. No recent skin rashes, skin pustules, etc. Multiple sites with pain. No morning stiffness. This started during pregnancy. (2017). Waxes and wanes since then. Was worse during pregnancy. Hands also hurt (but more recent). The worst areas are her neck, left shoulder, and both knees. But no trauma to those areas. No history of gout. Known depression but no SI/HI.  Failed effexor   Past Medical History:   Diagnosis Date    Asthma     Depression     Postpartum depression 2017     Past Surgical History:   Procedure Laterality Date    APPENDECTOMY  2016     SECTION N/A 2017     SE[facility]    LAPAROSCOPIC APPENDECTOMY  2016    Dr Jonel Almanza History     Socioeconomic History    Marital status: Single     Spouse name: Not on file    Number of children: Not on file    Years of education: Not on file    Highest education level: Not on file   Occupational History    Not on file   Social Needs    Financial resource strain: Not on file    Food insecurity:     Worry: Not on file     Inability: Not on file    Transportation needs:     Medical: Not on file     Non-medical: Not on file   Tobacco Use    Smoking status: Never Smoker    Smokeless tobacco: Never Used   Substance and Sexual Activity    Alcohol use: No     Alcohol/week: 0.0 oz    Drug use: No    Sexual activity: Yes     Partners: Male   Lifestyle    Physical activity:     Days per week: Not on file     Minutes per session: Not on file    Stress: Not on file   Relationships    Social connections:     Talks on phone: Not on file     Gets together: Not on file     Attends Anabaptist service: Not on file     Active member of club or organization: Not on file     Attends meetings of clubs or organizations: Not on file     Relationship status: Not on file    Intimate partner violence:     Fear of current or ex partner: Not on file     Emotionally abused: Not on file     Physically abused: Not on file     Forced sexual activity: Not on file   Other Topics Concern    Not on file   Social History Narrative    Not on file     Allergies   Allergen Reactions    Geodon [Ziprasidone Hcl]     Onion     Seasonal Other (See Comments)     Current Outpatient Medications on File Prior to Visit   Medication Sig Dispense Refill    levonorgestrel-ethinyl estradiol (NORDETTE) 0.15-30 MG-MCG per tablet Take 1 tablet by mouth daily Skip placebo pills 1 packet 11    levonorgestrel-ethinyl estradiol (NORDETTE) 0.15-30 MG-MCG per tablet Take 1 tablet by mouth daily 1 packet 11    DULoxetine (CYMBALTA) 30 MG extended release capsule Take 1 capsule by mouth daily 30 capsule 3    PATADAY 0.2 % SOLN ophthalmic solution   0    Azelastine HCl 137 MCG/SPRAY SOLN instill 2 sprays into each nostril twice a day  0    fluticasone (FLONASE) 50 MCG/ACT nasal spray instill 2 spray into each nostril twice a day  0    propranolol (INDERAL LA) 80 MG extended release capsule Take 1 capsule by mouth daily 30 capsule 5    VENTOLIN  (90 Base) MCG/ACT inhaler inhale 2 puffs by mouth four times a day if needed  0     No current facility-administered medications on file prior to visit. I have personally reviewed the ROS, PMH, PFH, and social history     Review of Systems   Constitutional: Negative for chills and fever. HENT: Negative for congestion. Eyes: Negative for pain.    Respiratory: Negative for shortness of breath. Cardiovascular: Negative for chest pain. Gastrointestinal: Negative for abdominal pain. Genitourinary: Negative for hematuria. Musculoskeletal: Positive for arthralgias, joint swelling and myalgias. Negative for back pain. Allergic/Immunologic: Negative for immunocompromised state. Neurological: Negative for headaches. Psychiatric/Behavioral: Negative for hallucinations. Objective:   /76 (Site: Left Upper Arm, Position: Sitting, Cuff Size: Large Adult)   Pulse 114   Temp 98 °F (36.7 °C) (Oral)   Resp 12   Ht 5' 6\" (1.676 m)   Wt 200 lb 3.2 oz (90.8 kg)   SpO2 98%   BMI 32.31 kg/m²     Physical Exam   Constitutional: She is oriented to person, place, and time. She appears well-developed and well-nourished. HENT:   Head: Normocephalic. Eyes: Pupils are equal, round, and reactive to light. Neck: No tracheal deviation present. Cardiovascular: Normal rate, regular rhythm and normal heart sounds. Exam reveals no gallop and no friction rub. No murmur heard. Pulmonary/Chest: No respiratory distress. Abdominal: Soft. Bowel sounds are normal. She exhibits no distension. There is no rebound. Musculoskeletal: She exhibits no edema. Left GH joint without warmth or erythema  Negative drop arm test  NO pain with little or neers test    No erythema, ballottement, warmth, or swelling over patella. No pain with palpitation of MCL or LCL  Negative anterior and posterior lachman test  Negative mccmury test   Negative patellar grind test      Negative spurling sign    Neurological: She is oriented to person, place, and time. She displays normal reflexes. Skin: Skin is warm and dry. Assessment:       Diagnosis Orders   1.  Arthralgia, unspecified joint  Ambulatory referral to Physical Therapy    CBC Auto Differential    Comprehensive Metabolic Panel    Hemoglobin A1C    Lipid Panel    TSH without Reflex    Vitamin D 25 Hydroxy    HCG Qualitative, Serum    CK Expiration Date:   5/9/2020    Comprehensive Metabolic Panel     Standing Status:   Future     Standing Expiration Date:   5/8/2020    Hemoglobin A1C     Standing Status:   Future     Standing Expiration Date:   5/8/2020    Lipid Panel     Standing Status:   Future     Standing Expiration Date:   5/8/2020     Order Specific Question:   Is Patient Fasting?/# of Hours     Answer:   8    TSH without Reflex     Standing Status:   Future     Standing Expiration Date:   5/8/2020    Vitamin D 25 Hydroxy     Standing Status:   Future     Standing Expiration Date:   5/8/2020    HCG Qualitative, Serum     Standing Status:   Future     Standing Expiration Date:   5/8/2020    CK     Standing Status:   Future     Standing Expiration Date:   0/8/5892    Cyclic Citrul Peptide Antibody, IgG     Standing Status:   Future     Standing Expiration Date:   5/9/2020    Rheumatoid Factor     Standing Status:   Future     Standing Expiration Date:   5/9/2020    VIVIEN     Standing Status:   Future     Standing Expiration Date:   5/9/2020    Ambulatory referral to Physical Therapy     Referral Priority:   Routine     Referral Type:   Eval and Treat     Referral Reason:   Specialty Services Required     Requested Specialty:   Physical Therapy     Number of Visits Requested:   1     No orders of the defined types were placed in this encounter. Return in about 1 month (around 6/6/2019) for regularly scheduled appointment with PCP, worsening symptoms, call ASAP for appointment. Claudeen Fleming, MD    If anything should change or worsen call ASAP, don't wait for next scheduled appointment.

## 2019-05-12 LAB
ANTINUCLEAR AB INTERPRETIVE COMMENT: NORMAL
ANTINUCLEAR ANTIBODY, HEP-2, IGG: NORMAL
CCP IGG ANTIBODIES: 2 UNITS (ref 0–19)

## 2019-05-16 ENCOUNTER — HOSPITAL ENCOUNTER (OUTPATIENT)
Dept: PHYSICAL THERAPY | Age: 29
Setting detail: THERAPIES SERIES
Discharge: HOME | End: 2019-05-16
Payer: COMMERCIAL

## 2019-05-16 PROCEDURE — 97110 THERAPEUTIC EXERCISES: CPT

## 2019-05-16 PROCEDURE — 97162 PT EVAL MOD COMPLEX 30 MIN: CPT

## 2019-05-16 ASSESSMENT — PAIN DESCRIPTION - LOCATION: LOCATION_2: NECK

## 2019-05-16 ASSESSMENT — PAIN DESCRIPTION - INTENSITY: RATING_2: 5

## 2019-05-16 ASSESSMENT — PAIN DESCRIPTION - PAIN TYPE: TYPE: CHRONIC PAIN

## 2019-05-16 ASSESSMENT — PAIN DESCRIPTION - PROGRESSION
CLINICAL_PROGRESSION_2: GRADUALLY WORSENING
CLINICAL_PROGRESSION: GRADUALLY WORSENING

## 2019-05-16 ASSESSMENT — PAIN - FUNCTIONAL ASSESSMENT: PAIN_FUNCTIONAL_ASSESSMENT: PREVENTS OR INTERFERES WITH ALL ACTIVE AND SOME PASSIVE ACTIVITIES

## 2019-05-16 ASSESSMENT — PAIN SCALES - GENERAL: PAINLEVEL_OUTOF10: 5

## 2019-05-16 ASSESSMENT — PAIN DESCRIPTION - DESCRIPTORS
DESCRIPTORS_2: DULL;STABBING
DESCRIPTORS: CONSTANT;ACHING

## 2019-05-16 ASSESSMENT — PAIN DESCRIPTION - ONSET
ONSET_2: PROGRESSIVE
ONSET: PROGRESSIVE

## 2019-05-16 ASSESSMENT — PAIN DESCRIPTION - DURATION: DURATION_2: CONTINUOUS

## 2019-05-16 ASSESSMENT — PAIN DESCRIPTION - FREQUENCY: FREQUENCY: CONTINUOUS

## 2019-05-16 ASSESSMENT — PAIN DESCRIPTION - ORIENTATION
ORIENTATION_2: LEFT;RIGHT;POSTERIOR;LOWER
ORIENTATION: LEFT;RIGHT

## 2019-05-16 NOTE — PROGRESS NOTES
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 Slight improvement post partum however with significant contd increased pain with standing/wbing </=1 hour. Denies any N/T into B LE's, saddle anesthesia, changes in bowel or bladder function, or unexplained weight loss. Pt reports having pain at night that wakes her up intermittently. Xrays of B knees/shoulder (-) for fx/dislocation, cspine straightening of the spine may reflect spasm, narrowing @C6-C7 and C7-T1. Neck pain >/=4 years that has been intermittent in nature has seen chiropractor with short term pain relief. Reports having frequent HA's, occasional dizziness and nausea. Denies changes in vision, speech or swallowing, or numbness in UE's. Has had blood work taken.   Comments: RTD 6/11/19     Objective:   Strength RLE  Comment: 5/5 Except Hip flex, abd 4+/5 hip ext 4/5   Strength LLE  Comment: 5/5 Except Hip flex, abd 4+/5 hiip ext 4/5   Strength RUE  Comment: 4+/5 except elbow flex, rhomboid, MT, LT, Lats 4/5  Strength LUE  Comment: 4+/5 except elbow flex, MT, LT, 4/5 Lats 4-/5  Strength Other  Other: poor abdominal strength with low abdominal march and upper abdominal strength with inability to perform sit up with UE's extended     PROM RLE (degrees)  RLE General PROM: 90/90 HS 43   AROM RLE (degrees)  RLE AROM: WFL  RLE General AROM: Hip ER 30, IR 44  PROM LLE (degrees)  LLE General PROM: 90/90 HS 44   AROM LLE (degrees)  LLE AROM : WFL  LLE General AROM: Hip ER 25, IR 45     AROM RUE (degrees)  RUE AROM : WFL  AROM LUE (degrees)  LUE AROM : WFL    Spine  Cervical: flex 15 ext 33 SB L 40 R 50 Rot L 54 R 72(pain with all mvmt )  Lumbar: flex 75%, B SB and ext WFL's Observation/Palpation  Posture: Fair  Palpation: mod tightness with TTP B UT, LS, SCM, sub-occipitals, cervical paraspinals   Observation: joint hypermobility of elbows and knees; rounded shoudlers, forward head, B genuvalgus, genurecervatum, flattened lumbar lordosis, squinting patella      Exercises:   Exercises  Exercise 1: TA elio 5\"x10  Exercise 2: bridges 5\"x10  Exercise 3: HS stretch*  Exercise 4: SF/NS*  Exercise 5: SLR with TA active*  Exercise 6: S/L hip abd*  Exercise 7: prone hip ext*  Exercise 8: Abd walkouts*  Exercise 12: supine chin nods 5\"x10  Exercise 13: scap retract 5\"x10  Exercise 14: supine chest pulls*  Exercise 15: prone horiz abd IR/ER*  Exercise 16: tband B ER*   Exercise 17: rows/lats*   Exercise 20: HEP: bridges, TA elio, chin nods, scap retract   Modalities:  Modalities  Moist heat: PRN*  Cryotherapy (Minutes\Location): PRN*  E-stim (parameters): PRN*  *Indicates exercise,modality, or manual techniques to be initiated when appropriate  Assessment: Body structures, Functions, Activity limitations: Decreased functional mobility , Decreased ROM, Decreased strength, Increased Pain  Assessment: Pt presents with a chronic hx of generalized pain in neck and B LE's primarily in knees which has progressively worsened over the last 2 years. She currently demos decreased cervical, lumbar flex, and B Hip ER arom, decreased postural awareness, decreased B periscpaular, hip, and core strength. These impairments currently limit her fucntional abilities to lift, carry, walk, stand, sleep, perform work related ADL's, or household duties without increased pain or limitations.     Prognosis: Good  Discharge Recommendations: Continue to assess pending progress  Activity Tolerance: Patient Tolerated treatment well     Decision Making: Medium Complexity  History: med  Exam: high; LEFS 51/80  Clinical Presentation: med      Plan  Frequency/Duration:  Plan  Times per week: 2  Plan weeks: 4-6  Current Treatment Recommendations: Strengthening, ROM, Aquatics, Patient/Caregiver Education & Training, Manual Therapy - Joint Manipulation, Pain Management, Modalities, Home Exercise Program, Integrated Dry Needling, Manual Therapy - Soft Tissue Mobilization  Plan Comment: consider aquatics if land not well tolerated     Patient Education  New Education Provided: Pt eductaed on POC, evaluative findings, and provided written handout of HEP    POST-PAIN     Pain Rating (0-10 pain scale): same  Location and pain description same as pre-treatment unless indicated. Action: [] NA  [] Call Physician  [x] Perform HEP  [] Meds as prescribed    Evaluation and patient rights have been reviewed and patient agrees with plan of care. Yes  [x]  No  []   Explain:     [place] Fall Risk Assessment  Risk Factor Scale  Score   History of Falls [] Yes  [x] No 25  0    Secondary Diagnosis [] Yes  [x] No 15  0    Ambulatory Aid [] Furniture  [] Crutches/cane/walker  [x] None/bedrest/wheelchair/nurse 30  15  0    IV/Heparin Lock [] Yes  [x] No 20  0    Gait/Transferring [] Impaired  [] Weak  [x] Normal/bedrest/immobile 20  10  0    Mental Status [] Forgets limitations  [x] Oriented to own ability 15  0       Total: 0     Based on the Assessment score: check the appropriate box.   [x]  No intervention needed   Low =   Score of 0-24  []  Use standard prevention interventions Moderate =  Score of 24-44   [] Discuss fall prevention strategies   [] Indicate moderate falls risk on eval  []  Use high risk prevention interventions High = Score of 45 and higher   [] Discuss fall prevention strategies   [] Provide supervision during treatment time    Goals  Short term goals  Time Frame for Short term goals: 2-3 weeks   Short term goal 1: The pt will demonstrate improved postural awareness requiring <25% VC's with exercises  Long term goals  Long term goal 1: The pt will demo improved B hip strength 5/5 and core strength to fair in order to safely ambulate

## 2019-05-16 NOTE — PROGRESS NOTES
will have a increase in LEFS score >/=10 points in order to increase functional activity tolerance 51/80 [] yes  [x] no   Long term goal 4: The pt will be indep/compliant with HEP in order to self manage symptoms upon D/C ongoing [] yes  [x] no   Long term goal 5: The pt will demonstrate improved B periscapular strength >/=4+/5 in order to lift/carry with decreased pain  Strength RUE  Comment:rhomboid, MT, LT, Lats 4/5  Strength LUE  Comment:  MT, LT, 4/5 Lats 4-/5 [] yes  [x] no      Body structures, Functions, Activity limitations: Decreased functional mobility , Decreased ROM, Decreased strength, Increased Pain  Assessment: Pt presents with a chronic hx of generalized pain in neck and B LE's primarily in knees which has progressively worsened over the last 2 years. She currently demos decreased cervical, lumbar flex, and B Hip ER arom, decreased postural awareness, decreased B periscpaular, hip, and core strength. These impairments currently limit her fucntional abilities to lift, carry, walk, stand, sleep, perform work related ADL's, or household duties without increased pain or limitations. Prognosis: Good  Discharge Recommendations: Continue to assess pending progress    New Education Provided: Pt eductaed on POC, evaluative findings, and provided written handout of HEP    PLAN: [x] Evaluate and Treat  Frequency/Duration:  Plan  Times per week: 2  Plan weeks: 4-6  Current Treatment Recommendations: Strengthening, ROM, Aquatics, Patient/Caregiver Education & Training, Manual Therapy - Joint Manipulation, Pain Management, Modalities, Home Exercise Program, Integrated Dry Needling, Manual Therapy - Soft Tissue Mobilization  Plan Comment: consider aquatics if land not well tolerated              ? Patient Status:[x] Continue/ Initiate plan of Care    [] Discharge PT. Recommend pt continue with HEP.      [] Additional visits requested, Please re-certify for additional visits:       Signature: Electronically

## 2019-05-20 ENCOUNTER — HOSPITAL ENCOUNTER (OUTPATIENT)
Dept: PHYSICAL THERAPY | Age: 29
Setting detail: THERAPIES SERIES
Discharge: HOME | End: 2019-05-20
Payer: COMMERCIAL

## 2019-05-20 NOTE — PROGRESS NOTES
[address]       Physical Therapy  Cancellation/No-show Note  Patient Name:  Louie Boudreaux  :  1990   Date:  2019  Referring Practitioner: Alverto Campos  Diagnosis: Arthralgia Unspecified joint, muscle pain    Visit Information:  PT Visit Information  PT Insurance Information: [employer]  Total # of Visits Approved: 30  Total # of Visits to Date: 1  No Show: 0  Canceled Appointment: 1  Progress Note Counter:  (cx on )    For today's appointment patient:  [x]  Cancelled  []  Rescheduled appointment  []  No-show   []  Called pt to remind of next appointment     Reason given by patient:  []  Patient ill  []  Conflicting appointment  []  No transportation    []  Conflict with work  []  No reason given  [x]  Other:   Son is sick     Comments:       Signature: Electronically signed by Reny Valle PTA on 19 at 11:55 AM

## 2019-05-22 ENCOUNTER — HOSPITAL ENCOUNTER (OUTPATIENT)
Dept: PHYSICAL THERAPY | Age: 29
Setting detail: THERAPIES SERIES
Discharge: HOME | End: 2019-05-22
Payer: COMMERCIAL

## 2019-05-22 PROCEDURE — 97140 MANUAL THERAPY 1/> REGIONS: CPT

## 2019-05-22 PROCEDURE — 97110 THERAPEUTIC EXERCISES: CPT

## 2019-05-22 ASSESSMENT — PAIN SCALES - GENERAL: PAINLEVEL_OUTOF10: 7

## 2019-05-22 ASSESSMENT — PAIN DESCRIPTION - LOCATION: LOCATION: HEAD;NECK

## 2019-05-22 ASSESSMENT — PAIN DESCRIPTION - PAIN TYPE: TYPE: CHRONIC PAIN

## 2019-05-22 NOTE — PROGRESS NOTES
[id number] [address]  Outpatient Physical Therapy    Treatment Note        Date: 2019  Patient: Yaneli Dill  : 1990  ACCT #: [de-identified]  Referring Practitioner: Scott Garrett  Diagnosis: Arthralgia Unspecified joint, muscle pain    Visit Information:  PT Visit Information  PT Insurance Information: [employer]  Total # of Visits Approved: 30  Total # of Visits to Date: 2  No Show: 0  Canceled Appointment: 1  Progress Note Counter: -     Subjective: Completing HEP. Has had a HA for 2 days. Comments: RTD 19   HEP Compliance:  [x] Good [] Fair [] Poor [] Reports not doing due to:    Vital Signs  Patient Currently in Pain: Yes   Pain Screening  Patient Currently in Pain: Yes  Pain Assessment  Pain Assessment: 0-10  Pain Level: 7  Pain Type: Chronic pain  Pain Location: Head;Neck    OBJECTIVE:   Exercises  Exercise 1: TA elio 5\"x10  Exercise 2: bridges 5\"x10  Exercise 3: HS stretch*  Exercise 4: SF/NS*  Exercise 5: SLR with TA active X 10  Exercise 6: S/L hip abd X 10   Exercise 7: prone hip ext X 10  Exercise 8: Abd walkouts X 5  Exercise 9: Seated TA elio with hands on knees X 10 5\" hold  Exercise 12: supine chin nods 5\"x10  Exercise 13: scap retract 5\"x10(focus on elongating the clavicals vs intense scap retraction for pain.)  Exercise 14: supine chest pulls X 10 YTB  Exercise 15: prone horiz abd IR/ER*  Exercise 16: tband B ER*   Exercise 17: rows/lats*   Exercise 20: HEP: bridges, TA elio, chin nods, scap retract      Strength: [x] NT  [] MMT completed:     ROM: [x] NT  [] ROM measurements:      Manual:   Manual therapy  Soft Tissue Mobalization: cervical spine - STM and gentle distraction followed by sub occiptal release X 8 minutes  Other:  Total time: 8 minutes    Modalities:  Modalities  Moist heat: HP to neck X 10' to address pain  Cryotherapy (Minutes\Location): PRN*  E-stim (parameters): PRN*     *Indicates exercise, modality, or manual techniques to be initiated when appropriate    Assessment: Body structures, Functions, Activity limitations: Decreased functional mobility , Decreased ROM, Decreased strength, Increased Pain  Assessment: Reports decrease to intensity of HA after manual therapy. VC's during MILENA for safe and appropriate technique and intensity of muscle contraction. Modified TA elio to seated position for sensory recalibration to allow patient to perform correctly. Treatment Diagnosis: neck pain, B Le pain, knee pain, decreased cervical, lumbar flex, and B Hip ER arom, decreased postural awareness, decreased B periscpaular, hip, and core strength  Prognosis: Good  Patient Education:  Educated on posture, how it can affect the body and increase pain. Educated on safe and appropriate correction. States she understands. Goals:  Short term goals  Time Frame for Short term goals: 2-3 weeks   Short term goal 1: The pt will demonstrate improved postural awareness requiring <25% VC's with exercises    Long term goals  Long term goal 1: The pt will demo improved B hip strength 5/5 and core strength to fair in order to safely ambulate with decreased pain/limitations   Long term goal 2: The pt will demo improved lumbar flex, B hip ER, and cervical flex/ext and L Rot AROM to WFL's in order to increase ease with ADL's  Long term goal 3: The pt will have a increase in LEFS score >/=10 points in order to increase functional activity tolerance  Long term goal 4: The pt will be indep/compliant with HEP in order to self manage symptoms upon D/C  Long term goal 5: The pt will demonstrate improved B periscapular strength >/=4+/5 in order to lift/carry with decreased pain   Progress toward goals: Initiated manual therapy to dec pain and inc functional ROM and posture. POST-PAIN       Pain Rating (0-10 pain scale):  6  /10   Location and pain description same as pre-treatment unless indicated.    Action: [] NA   [x] Perform HEP  [] Meds as prescribed  [] Modalities as

## 2019-05-24 ENCOUNTER — HOSPITAL ENCOUNTER (OUTPATIENT)
Dept: PHYSICAL THERAPY | Age: 29
Setting detail: THERAPIES SERIES
Discharge: HOME | End: 2019-05-24
Payer: COMMERCIAL

## 2019-05-24 PROCEDURE — 97110 THERAPEUTIC EXERCISES: CPT

## 2019-05-24 PROCEDURE — 97140 MANUAL THERAPY 1/> REGIONS: CPT

## 2019-05-24 ASSESSMENT — PAIN SCALES - GENERAL: PAINLEVEL_OUTOF10: 4

## 2019-05-24 ASSESSMENT — PAIN DESCRIPTION - LOCATION: LOCATION: NECK;LEG;FOOT

## 2019-05-24 ASSESSMENT — PAIN DESCRIPTION - ORIENTATION: ORIENTATION: RIGHT;LEFT

## 2019-05-24 ASSESSMENT — PAIN DESCRIPTION - PAIN TYPE: TYPE: CHRONIC PAIN

## 2019-05-24 ASSESSMENT — PAIN DESCRIPTION - DESCRIPTORS: DESCRIPTORS: CONSTANT;ACHING

## 2019-05-24 NOTE — PROGRESS NOTES
needed. Mulitple compensations throughout  Treatment Diagnosis: neck pain, B Le pain, knee pain, decreased cervical, lumbar flex, and B Hip ER arom, decreased postural awareness, decreased B periscpaular, hip, and core strength  Prognosis: Good     Goals:  Short term goals  Time Frame for Short term goals: 2-3 weeks   Short term goal 1: The pt will demonstrate improved postural awareness requiring <25% VC's with exercises  Long term goals  Long term goal 1: The pt will demo improved B hip strength 5/5 and core strength to fair in order to safely ambulate with decreased pain/limitations   Long term goal 2: The pt will demo improved lumbar flex, B hip ER, and cervical flex/ext and L Rot AROM to WFL's in order to increase ease with ADL's  Long term goal 3: The pt will have a increase in LEFS score >/=10 points in order to increase functional activity tolerance  Long term goal 4: The pt will be indep/compliant with HEP in order to self manage symptoms upon D/C  Long term goal 5: The pt will demonstrate improved B periscapular strength >/=4+/5 in order to lift/carry with decreased pain   Progress toward goals: inc strength, rom, dec pain    POST-PAIN       Pain Rating (0-10 pain scale):   4-5/10   Location and pain description same as pre-treatment unless indicated. Action: [] NA   [x] Perform HEP  [] Meds as prescribed  [] Modalities as prescribed   [] Call Physician     Frequency/Duration:  Plan  Times per week: 2  Plan weeks: 4-6  Current Treatment Recommendations: Strengthening, ROM, Aquatics, Patient/Caregiver Education & Training, Manual Therapy - Joint Manipulation, Pain Management, Modalities, Home Exercise Program, Integrated Dry Needling, Manual Therapy - Soft Tissue Mobilization  Plan Comment: consider aquatics if land not well tolerated      Pt to continue current HEP. See objective section for any therapeutic exercise changes, additions or modifications this date.      PT Individual Minutes  Time In:

## 2019-05-28 ENCOUNTER — HOSPITAL ENCOUNTER (OUTPATIENT)
Dept: PHYSICAL THERAPY | Age: 29
Setting detail: THERAPIES SERIES
Discharge: HOME | End: 2019-05-28
Payer: COMMERCIAL

## 2019-05-28 PROCEDURE — 97110 THERAPEUTIC EXERCISES: CPT

## 2019-05-28 PROCEDURE — 97140 MANUAL THERAPY 1/> REGIONS: CPT

## 2019-05-28 NOTE — PROGRESS NOTES
[address] [address]  Outpatient Physical Therapy    Treatment Note        Date: 2019  Patient: Abner Perez  : 1990  ACCT #: [de-identified]  Referring Practitioner: Jaden Carpenter  Diagnosis: Arthralgia Unspecified joint, muscle pain    Visit Information:  PT Visit Information  PT Insurance Information: [employer]  Total # of Visits Approved: 30  Total # of Visits to Date: 4  No Show: 0  Canceled Appointment: 1  Progress Note Counter:     Subjective: Pt reports coming down with a cold today, denies pain currently stating \"it gets worse as the day goes on. \"  Comments: RTD 19   HEP Compliance:  [x] Good [] Fair [] Poor [] Reports not doing due to:    OBJECTIVE:   Exercises  Exercise 1: TA elio w/ breath 5\"x15  Exercise 2: bridges 5\"x10  Exercise 4: SF L1.0 x5min  Exercise 5: SLR with TA active X 10  Exercise 6: S/L hip abd X 10   Exercise 8: Abd walkouts X 10  Exercise 12: supine chin nods 5\"x15  Exercise 14: supine chest pulls IR/ER X 10 YTB  Exercise 17: rows/lats YTB x10 ea  Exercise 18: UT b/l & LS stretch L only 3x30'' ea  Exercise 19: corner stretch 3x20''  Exercise 20: HEP: HS, UT, and LS stretches     Strength: [x] NT  [] MMT completed:    ROM: [x] NT  [] ROM measurements:     Manual:   Manual therapy  Soft Tissue Mobalization: cervical spine STM L>R focus on UT/LS x6min    Modalities:  Modalities  Moist heat: HP to neck X 10' to address pain     *Indicates exercise, modality, or manual techniques to be initiated when appropriate    Assessment: Body structures, Functions, Activity limitations: Decreased functional mobility , Decreased ROM, Decreased strength, Increased Pain  Assessment: Pt reports increased L sided neck pain with performing R LS stretch therefore held this date. Contd increased mm tightness noted throughout cervical spine L>R. Pt reports feeling \"a little better\" post exertion despite providing pain number upon entrance to clinic.    Treatment Diagnosis: neck pain, B 10    Signature:  Electronically signed by Kale Lee PT on 5/28/19 at 3:44 PM

## 2019-06-03 ENCOUNTER — HOSPITAL ENCOUNTER (OUTPATIENT)
Dept: PHYSICAL THERAPY | Age: 29
Setting detail: THERAPIES SERIES
Discharge: HOME | End: 2019-06-03
Payer: COMMERCIAL

## 2019-06-03 PROCEDURE — 97140 MANUAL THERAPY 1/> REGIONS: CPT

## 2019-06-03 PROCEDURE — 97110 THERAPEUTIC EXERCISES: CPT

## 2019-06-03 ASSESSMENT — PAIN DESCRIPTION - LOCATION: LOCATION: NECK;ARM

## 2019-06-03 ASSESSMENT — PAIN DESCRIPTION - PAIN TYPE: TYPE: CHRONIC PAIN

## 2019-06-03 ASSESSMENT — PAIN DESCRIPTION - FREQUENCY: FREQUENCY: CONTINUOUS

## 2019-06-03 ASSESSMENT — PAIN DESCRIPTION - ORIENTATION: ORIENTATION: LEFT

## 2019-06-03 ASSESSMENT — PAIN SCALES - GENERAL: PAINLEVEL_OUTOF10: 5

## 2019-06-03 NOTE — PROGRESS NOTES
[address] [address]  Outpatient Physical Therapy    Treatment Note        Date: 6/3/2019  Patient: Clint Conway  : 1990  ACCT #: [de-identified]  Referring Practitioner: Chrsitopher Case  Diagnosis: Arthralgia Unspecified joint, muscle pain    Visit Information:  PT Visit Information  PT Insurance Information: [employer]  Total # of Visits Approved: 30  Total # of Visits to Date: 5  No Show: 0  Canceled Appointment: 1  Progress Note Counter: -    Subjective: Pt reporting 5-6/10 pain in Lt side of neck and into UE. Pt reports \"Usually the pain comes on at night around 7 or 8 but today it started ~1 hr ago. \"   Comments: RTD 19   HEP Compliance:  [x] Good [] Fair [] Poor [] Reports not doing due to:    Vital Signs  Patient Currently in Pain: Yes   Pain Screening  Patient Currently in Pain: Yes  Pain Assessment  Pain Assessment: 0-10  Pain Level: 5(5-6)  Pain Type: Chronic pain  Pain Location: Neck;Arm  Pain Orientation: Left  Pain Descriptors: Radiating;Sore;Aching  Pain Frequency: Continuous    OBJECTIVE:   Exercises  Exercise 1: TA elio w/ breath 5\"x15  Exercise 2: bridges 5\"x10  Exercise 4: SF L1.0 x5min  Exercise 5: SLR with TA active X 10  Exercise 6: S/L hip abd X 10   Exercise 8: Abd walkouts X 10  Exercise 12: supine chin nods 5\"x15  Exercise 14: supine chest pulls IR and neutal  X 10 YTB (inc pain in ER)   Exercise 15: prone horiz abd IR/ER x10 ea   Exercise 17: rows/lats YTB x10 ea  Exercise 18: UT b/l & LS stretch L only 3x30'' ea  Exercise 19: corner stretch 3x20''  Exercise 20: HEP: lats/rows w/ YTB     Strength: [x] NT  [] MMT completed:     ROM: [x] NT  [] ROM measurements:      Manual:   Manual therapy  PROM: gentle Rt rot/UT stretching   Manual traction: Gentle w/ some relief  Soft Tissue Mobalization: cervical spine STM L>R focus on UT/LS, sub occ release x6min  Other:  Total time: 10 minutes    Modalities:  Modalities  Moist heat: HP to neck X 10' to address pain     *Indicates exercise, modality, or manual techniques to be initiated when appropriate    Assessment: Body structures, Functions, Activity limitations: Decreased functional mobility , Decreased ROM, Decreased strength, Increased Pain  Assessment: Initiated prone scap series w/ modification to ER D/T pain, otherwise good julia. Held further progression of reps w/ most exs D/T pt demo'ing  inc fatigue/ gradual loss of form w/ exertion. Some relief of pain/mm tightness during/post manual though no change in LT UE pain reported. Treatment Diagnosis: neck pain, B Le pain, knee pain, decreased cervical, lumbar flex, and B Hip ER arom, decreased postural awareness, decreased B periscpaular, hip, and core strength  Prognosis: Good     Goals:  Short term goals  Time Frame for Short term goals: 2-3 weeks   Short term goal 1: The pt will demonstrate improved postural awareness requiring <25% VC's with exercises  Long term goals  Long term goal 1: The pt will demo improved B hip strength 5/5 and core strength to fair in order to safely ambulate with decreased pain/limitations   Long term goal 2: The pt will demo improved lumbar flex, B hip ER, and cervical flex/ext and L Rot AROM to WFL's in order to increase ease with ADL's  Long term goal 3: The pt will have a increase in LEFS score >/=10 points in order to increase functional activity tolerance  Long term goal 4: The pt will be indep/compliant with HEP in order to self manage symptoms upon D/C  Long term goal 5: The pt will demonstrate improved B periscapular strength >/=4+/5 in order to lift/carry with decreased pain   Progress toward goals: Lumbar ROM, postural/LE strength     POST-PAIN       Pain Rating (0-10 pain scale):   3/10   Location and pain description same as pre-treatment unless indicated.    Action: [] NA   [x] Perform HEP  [] Meds as prescribed  [] Modalities as prescribed   [] Call Physician     Frequency/Duration:  Plan  Times per week: 2  Plan weeks: 4-6  Current Treatment Recommendations: Strengthening, ROM, Aquatics, Patient/Caregiver Education & Training, Manual Therapy - Joint Manipulation, Pain Management, Modalities, Home Exercise Program, Integrated Dry Needling, Manual Therapy - Soft Tissue Mobilization  Plan Comment: consider aquatics if land not well tolerated      Pt to continue current HEP. See objective section for any therapeutic exercise changes, additions or modifications this date.      PT Individual Minutes  Time In: 5710  Time Out: 0184  Minutes: 51  Timed Code Treatment Minutes: 41 Minutes  Procedure Minutes: 10 min     Signature:  Electronically signed by Joyce Mckeon PTA on 6/3/19 at 3:08 PM

## 2019-06-05 ENCOUNTER — HOSPITAL ENCOUNTER (OUTPATIENT)
Dept: PHYSICAL THERAPY | Age: 29
Setting detail: THERAPIES SERIES
Discharge: HOME | End: 2019-06-05
Payer: COMMERCIAL

## 2019-06-05 PROCEDURE — 97110 THERAPEUTIC EXERCISES: CPT

## 2019-06-05 PROCEDURE — 97140 MANUAL THERAPY 1/> REGIONS: CPT

## 2019-06-05 ASSESSMENT — PAIN DESCRIPTION - LOCATION: LOCATION: BACK;LEG;NECK

## 2019-06-05 ASSESSMENT — PAIN SCALES - GENERAL: PAINLEVEL_OUTOF10: 5

## 2019-06-05 ASSESSMENT — PAIN DESCRIPTION - ORIENTATION: ORIENTATION: RIGHT;LEFT

## 2019-06-05 ASSESSMENT — PAIN DESCRIPTION - PAIN TYPE: TYPE: CHRONIC PAIN

## 2019-06-05 ASSESSMENT — PAIN DESCRIPTION - DESCRIPTORS: DESCRIPTORS: ACHING;SORE

## 2019-06-05 NOTE — PROGRESS NOTES
min    Modalities:  Modalities  Moist heat: HP to neck X 10' to address pain     *Indicates exercise, modality, or manual techniques to be initiated when appropriate    Assessment: Body structures, Functions, Activity limitations: Decreased functional mobility , Decreased ROM, Decreased strength, Increased Pain  Assessment: Initiated IDN to cervical spine HP's this date to further decrease mm tightness and improve NM mechanics with good tolerance. Pt demos improved cervical and B hip ER arom this date. contd limitations at end range lumbar flexion with no sig change in rom, added HS stretchhes to further address lumbopelvic tightness. Treatment Diagnosis: neck pain, B Le pain, knee pain, decreased cervical, lumbar flex, and B Hip ER arom, decreased postural awareness, decreased B periscpaular, hip, and core strength  Prognosis: Good  Patient Education: IDN with consent form signed and placed in paper chart, all questions answered and explanation of DN procedure given prior to needling     Goals:  Short term goals  Time Frame for Short term goals: 2-3 weeks   Short term goal 1: The pt will demonstrate improved postural awareness requiring <25% VC's with exercises    Long term goals  Long term goal 1: The pt will demo improved B hip strength 5/5 and core strength to fair in order to safely ambulate with decreased pain/limitations   Long term goal 2: The pt will demo improved lumbar flex, B hip ER, and cervical flex/ext and L Rot AROM to WFL's in order to increase ease with ADL's  Long term goal 3: The pt will have a increase in LEFS score >/=10 points in order to increase functional activity tolerance  Long term goal 4: The pt will be indep/compliant with HEP in order to self manage symptoms upon D/C  Long term goal 5:  The pt will demonstrate improved B periscapular strength >/=4+/5 in order to lift/carry with decreased pain   Progress toward goals: good towards cervical and B hip ER arom     POST-PAIN       Pain

## 2019-06-10 ENCOUNTER — HOSPITAL ENCOUNTER (OUTPATIENT)
Dept: PHYSICAL THERAPY | Age: 29
Setting detail: THERAPIES SERIES
Discharge: HOME | End: 2019-06-10
Payer: COMMERCIAL

## 2019-06-10 PROCEDURE — 97110 THERAPEUTIC EXERCISES: CPT

## 2019-06-10 ASSESSMENT — PAIN DESCRIPTION - DESCRIPTORS: DESCRIPTORS: ACHING

## 2019-06-10 ASSESSMENT — PAIN DESCRIPTION - LOCATION: LOCATION: NECK

## 2019-06-10 ASSESSMENT — PAIN DESCRIPTION - ORIENTATION: ORIENTATION: RIGHT;LEFT

## 2019-06-10 ASSESSMENT — PAIN SCALES - GENERAL: PAINLEVEL_OUTOF10: 4

## 2019-06-10 ASSESSMENT — PAIN DESCRIPTION - PAIN TYPE: TYPE: CHRONIC PAIN

## 2019-06-10 NOTE — PROGRESS NOTES
[address] [address]  Outpatient Physical Therapy    Treatment Note        Date: 6/10/2019  Patient: Clint Conway  : 1990  ACCT #: [de-identified]  Referring Practitioner: Christopher Case  Diagnosis: Arthralgia Unspecified joint, muscle pain    Visit Information:  PT Visit Information  PT Insurance Information: [employer]  Total # of Visits Approved: 30  Total # of Visits to Date: 7  No Show: 0  Canceled Appointment: 1  Progress Note Counter:     Subjective: Pt reports no significant change in pain levels with initiation of PT, neck conts to have more pain than in back   Comments: RTD 19   HEP Compliance:  [x] Good [] Fair [] Poor [] Reports not doing due to:    Vital Signs  Patient Currently in Pain: Yes   Pain Screening  Patient Currently in Pain: Yes  Pain Assessment  Pain Assessment: 0-10  Pain Level: 4  Pain Type: Chronic pain  Pain Location: Neck  Pain Orientation: Right;Left  Pain Descriptors: Aching    OBJECTIVE:   Exercises  Exercise 1: TA elio w/ breath 5\"x20  Exercise 2: bridges 5\"x15  Exercise 3: HS stretch 30\"x3 B   Exercise 4: SF L1.5 x5min  Exercise 5: SLR with TA active X 15  Exercise 6: S/L hip abd X 15  Exercise 12: supine chin nods 5\"x20  Exercise 14: supine chest pulls IR and neutal  X 10 RTB   Exercise 15: prone horiz abd IR/ER x10 ea   Exercise 16: prone LT x10   Exercise 17: prone rows/lats 1#x10; Rows/lats with YTB x15   Exercise 19: corner stretch 3x30''  Exercise 20: HEP: cont current     Strength: [] NT  [x] MMT completed:  Strength RLE  Comment: hip flex, ext 5/5 hip abd 4+/5   Strength LLE  Comment: hip flex/ext 5/5 hip abd   Strength RUE  Comment: Rhomboid 4+/5, MT, LT, Lats 4/5   Strength LUE  Comment: MT/Rhomboids 4/5, LT, Lats 4-/5   Strength Other  Other: poor abdominal strength    ROM: [] NT  [x] ROM measurements:     AROM RLE (degrees)  RLE General AROM: Hip ER 36 (omega taken 19)     AROM LLE (degrees)  LLE General AROM: Hip ER 32(omega taken 19) >/=4+/5 in order to lift/carry with decreased pain   Progress toward goals: poor, see PN    POST-PAIN       Pain Rating (0-10 pain scale):  same /10   Location and pain description same as pre-treatment unless indicated. Action: [] NA   [x] Perform HEP  [] Meds as prescribed  [] Modalities as prescribed   [] Call Physician     Frequency/Duration:  Plan  Times per week: 2  Plan weeks: 4-6  Specific instructions for Next Treatment: Hold to see MD 6/11/19  Current Treatment Recommendations: Strengthening, ROM, Aquatics, Patient/Caregiver Education & Training, Manual Therapy - Joint Manipulation, Pain Management, Modalities, Home Exercise Program, Integrated Dry Needling, Manual Therapy - Soft Tissue Mobilization     Pt to continue current HEP. See objective section for any therapeutic exercise changes, additions or modifications this date.     PT Individual Minutes  Time In: 8627  Time Out: Joe Murillo  Minutes: 44  Timed Code Treatment Minutes: 34 Minutes  Procedure Minutes: 10    Signature:  Electronically signed by Kevin Serrano PT on 6/10/19 at 11:17 AM

## 2019-06-10 NOTE — PROGRESS NOTES
points in order to increase functional activity tolerance 40/80 [] yes  [x] no   Long term goal 4: The pt will be indep/compliant with HEP in order to self manage symptoms upon D/C Indep/compliant with hep  [x] yes  [] no   Long term goal 5: The pt will demonstrate improved B periscapular strength >/=4+/5 in order to lift/carry with decreased pain  Strength RUE  Comment: Rhomboid 4+/5, MT, LT, Lats 4/5   Strength LUE  Comment: MT/Rhomboids 4/5, LT, Lats 4-/5  [] yes  [x] no        Body structures, Functions, Activity limitations: Decreased functional mobility , Decreased ROM, Decreased strength, Increased Pain  Assessment: pt with no signifciant change in pain levels or functional activity tolerance since beginning PT. Pt conts to report increased neck >LB pain that progresses as the day goes on. Pt with min change in B LE/core and periscpaular strength as well as cervical and lumbopelvic arom. Pt also with decrease in functional activity per LEFS. It is further recommended pt be placed on hold to F/U with MD for further assessment d/t lack of progress and no signifcant change in pain level or function. Specific instructions for Next Treatment: Hold to see MD 6/11/19  Prognosis: Good    PLAN:   Frequency/Duration:  Plan  Times per week: 2  Plan weeks: 4-6  Specific instructions for Next Treatment: Hold to see MD 6/11/19  Current Treatment Recommendations: Strengthening, ROM, Aquatics, Patient/Caregiver Education & Training, Manual Therapy - Joint Manipulation, Pain Management, Modalities, Home Exercise Program, Integrated Dry Needling, Manual Therapy - Soft Tissue Mobilization             ? Patient Status:Hold pending f/u with MD         Signature: Electronically signed by Mariano Matta PT on 6/10/19 at 10:48 AM    If you have any questions or concerns, please don't hesitate to call.   Thank you for your referral.    I have reviewed this plan of care and certify a need for medically necessary rehabilitation

## 2019-06-11 ENCOUNTER — OFFICE VISIT (OUTPATIENT)
Dept: FAMILY MEDICINE CLINIC | Age: 29
End: 2019-06-11
Payer: COMMERCIAL

## 2019-06-11 VITALS
HEART RATE: 98 BPM | DIASTOLIC BLOOD PRESSURE: 78 MMHG | RESPIRATION RATE: 15 BRPM | HEIGHT: 66 IN | BODY MASS INDEX: 32.3 KG/M2 | SYSTOLIC BLOOD PRESSURE: 126 MMHG | WEIGHT: 201 LBS | TEMPERATURE: 97.6 F | OXYGEN SATURATION: 98 %

## 2019-06-11 DIAGNOSIS — F32.A DEPRESSION, UNSPECIFIED DEPRESSION TYPE: ICD-10-CM

## 2019-06-11 DIAGNOSIS — M54.2 NECK PAIN: Primary | ICD-10-CM

## 2019-06-11 PROCEDURE — 1036F TOBACCO NON-USER: CPT | Performed by: INTERNAL MEDICINE

## 2019-06-11 PROCEDURE — 99214 OFFICE O/P EST MOD 30 MIN: CPT | Performed by: INTERNAL MEDICINE

## 2019-06-11 PROCEDURE — G8427 DOCREV CUR MEDS BY ELIG CLIN: HCPCS | Performed by: INTERNAL MEDICINE

## 2019-06-11 PROCEDURE — G8417 CALC BMI ABV UP PARAM F/U: HCPCS | Performed by: INTERNAL MEDICINE

## 2019-06-11 ASSESSMENT — ENCOUNTER SYMPTOMS
EYE PAIN: 0
ABDOMINAL PAIN: 0
BACK PAIN: 0
SHORTNESS OF BREATH: 0

## 2019-06-11 NOTE — PROGRESS NOTES
Subjective:      Patient ID: Abner Perez is a 33 y.o. female who presents today with:  Chief Complaint   Patient presents with    Follow-up   [address]     BW comp 19    Medication Problem     due to insurance reasons, patient had to stop taking all of her medications       HPI    Neck pain Since  early . Slowly getting worse. Plain films done in May showed Narrowing at c6-c7 and c7-T1. PT hasn't helped much. Ongoing pain that shoots to left shoulder. Intermittent radiation down her left arm. No new symptoms. No night sweats or weight loss. Depression-Chronic, has failed multiple meds. Mild, no SI/HI.    Past Medical History:   Diagnosis Date    Asthma     Depression     Postpartum depression 2017     Past Surgical History:   Procedure Laterality Date    APPENDECTOMY  2016     SECTION N/A 2017     SECTION performed by Andrea Malin MD at [address] [facility] OR    LAPAROSCOPIC APPENDECTOMY  2016    Dr Lorain Phoenix History     Socioeconomic History    Marital status: Single     Spouse name: Not on file    Number of children: Not on file    Years of education: Not on file    Highest education level: Not on file   Occupational History    Not on file   Social Needs    Financial resource strain: Not on file    Food insecurity:     Worry: Not on file     Inability: Not on file    Transportation needs:     Medical: Not on file     Non-medical: Not on file   Tobacco Use    Smoking status: Never Smoker    Smokeless tobacco: Never Used   Substance and Sexual Activity    Alcohol use: No     Alcohol/week: 0.0 oz    Drug use: No    Sexual activity: Yes     Partners: Male   Lifestyle    Physical activity:     Days per week: Not on file     Minutes per session: Not on file    Stress: Not on file   Relationships    Social connections:     Talks on phone: Not on file     Gets together: Not on file     Attends Mandaen service: Not on file     Active member of club or organization: Not on file     Attends meetings of clubs or organizations: Not on file     Relationship status: Not on file    Intimate partner violence:     Fear of current or ex partner: Not on file     Emotionally abused: Not on file     Physically abused: Not on file     Forced sexual activity: Not on file   Other Topics Concern    Not on file   Social History Narrative    Not on file     Allergies   Allergen Reactions    Geodon [Ziprasidone Hcl]     Onion     Seasonal Other (See Comments)     Current Outpatient Medications on File Prior to Visit   Medication Sig Dispense Refill    levonorgestrel-ethinyl estradiol (NORDETTE) 0.15-30 MG-MCG per tablet Take 1 tablet by mouth daily 1 packet 11    DULoxetine (CYMBALTA) 30 MG extended release capsule Take 1 capsule by mouth daily 30 capsule 3    PATADAY 0.2 % SOLN ophthalmic solution   0    Azelastine HCl 137 MCG/SPRAY SOLN instill 2 sprays into each nostril twice a day  0    fluticasone (FLONASE) 50 MCG/ACT nasal spray instill 2 spray into each nostril twice a day  0    propranolol (INDERAL LA) 80 MG extended release capsule Take 1 capsule by mouth daily 30 capsule 5    VENTOLIN  (90 Base) MCG/ACT inhaler inhale 2 puffs by mouth four times a day if needed  0     No current facility-administered medications on file prior to visit. I have personally reviewed the ROS, PMH, PFH, and social history     Review of Systems   Constitutional: Negative for chills and fever. HENT: Negative for congestion. Eyes: Negative for pain. Respiratory: Negative for shortness of breath. Cardiovascular: Negative for chest pain. Gastrointestinal: Negative for abdominal pain. Genitourinary: Negative for hematuria. Musculoskeletal: Positive for neck pain. Negative for back pain. Allergic/Immunologic: Negative for immunocompromised state. Neurological: Negative for headaches.    Psychiatric/Behavioral: Negative for hallucinations, self-injury and suicidal ideas. The patient is not nervous/anxious. Objective:   /78 (Site: Right Upper Arm, Position: Sitting, Cuff Size: Large Adult)   Pulse 98   Temp 97.6 °F (36.4 °C) (Tympanic)   Resp 15   Ht 5' 6\" (1.676 m)   Wt 201 lb (91.2 kg)   LMP 05/15/2019 (Approximate)   SpO2 98%   BMI 32.44 kg/m²     Physical Exam   Constitutional: She is oriented to person, place, and time. She appears well-developed and well-nourished. HENT:   Head: Normocephalic. Eyes: Pupils are equal, round, and reactive to light. Neck: No tracheal deviation present. Cardiovascular: Normal rate, regular rhythm and normal heart sounds. Exam reveals no gallop and no friction rub. No murmur heard. Pulmonary/Chest: No respiratory distress. Abdominal: Soft. Bowel sounds are normal. She exhibits no distension. There is no rebound. Musculoskeletal: She exhibits no edema. Lefts GH without erythema, warmth, or swelling  Negative drop arm test.   Diffuse 5/5 left upper extremity strength. Self reported pain with flexion/rotation/extension. No erythema, warmth, or fluctuance over cervical spine. Neurological: She is oriented to person, place, and time. She displays normal reflexes. No sensory deficit. She exhibits normal muscle tone. Skin: Skin is warm and dry. Assessment:       Diagnosis Orders   1. Neck pain  MRI CERVICAL SPINE W WO CONTRAST   2. Depression, unspecified depression type           Plan:    NO si/hi   Wants to wait on treatment until genesight test  Reflexes and sensation intact in left upper extremity will hold on EMG unless she calls and complains of new or worsening symptoms or exam changes. Agrees to plan. Follow up in 4 to 6 weeks  MRI cervical spine  Call asap if anything new or worsens.    Gene sight today   Orders Placed This Encounter   Procedures    MRI CERVICAL SPINE W WO CONTRAST     Standing Status:   Future     Standing Expiration Date:   6/11/2020     Order Specific Question:   Reason for exam:     Answer:   neck pain with movement. No orders of the defined types were placed in this encounter. Return in about 1 month (around 7/9/2019) for regularly scheduled appointment with PCP, worsening symptoms, call ASAP for appointment. Yasmine Marks MD    If anything should change or worsen call ASAP, don't wait for next scheduled appointment.

## 2019-06-11 NOTE — PROGRESS NOTES
[address]       Physical Therapy  Cancellation/No-show Note  Patient Name:  Macho Pruett  :  1990   Date:  2019  Referring Practitioner: Tonya Gallego  Diagnosis: Arthralgia Unspecified joint, muscle pain    Visit Information:  PT Visit Information  [facility] [place] [facility] [facility]  Total # of Visits Approved: 30  Total # of Visits to Date: 7  No Show: 0  Canceled Appointment: 2  Progress Note Counter: - pt called today to cx. 19 appt. wants to be on hold fo 30 days.     For today's appointment patient:  [x]  Cancelled  []  Rescheduled appointment  []  No-show   []  Called pt to remind of next appointment     Reason given by patient:  []  Patient ill  []  Conflicting appointment  []  No transportation    []  Conflict with work  []  No reason given  [x]  Other:       Comments:       Signature: Electronically signed by Irving Sen PTA on 19 at 2:02 PM

## 2019-06-11 NOTE — PATIENT INSTRUCTIONS
· Your neck pain is getting worse.     · You are not getting better after 1 week.     · You do not get better as expected. Where can you learn more? Go to [email or web address] and sign in to your [employer] account. Enter [phone] in the [facility] box to learn more about \"Neck Pain: Care Instructions. \"     If you do not have an account, please click on the \"Sign Up Now\" link. Current as of: September 20, 2018  Content Version: 12.0  © 2105-9752 Healthwise, Incorporated. Care instructions adapted under license by [facility] ([facility]). If you have questions about a medical condition or this instruction, always ask your healthcare professional. [address] any warranty or liability for your use of this information.

## 2019-06-12 ENCOUNTER — HOSPITAL ENCOUNTER (OUTPATIENT)
Dept: PHYSICAL THERAPY | Age: 29
Setting detail: THERAPIES SERIES
Discharge: HOME | End: 2019-06-12
Payer: COMMERCIAL

## 2019-06-19 ENCOUNTER — HOSPITAL ENCOUNTER (OUTPATIENT)
Dept: MRI IMAGING | Age: 29
Discharge: HOME | End: 2019-06-21
Payer: COMMERCIAL

## 2019-06-19 ENCOUNTER — TELEPHONE (OUTPATIENT)
Dept: FAMILY MEDICINE CLINIC | Age: 29
End: 2019-06-19

## 2019-06-19 DIAGNOSIS — M54.2 NECK PAIN: Primary | ICD-10-CM

## 2019-06-19 DIAGNOSIS — M54.2 NECK PAIN: ICD-10-CM

## 2019-06-19 PROCEDURE — 72141 MRI NECK SPINE W/O DYE: CPT

## 2019-06-19 NOTE — TELEPHONE ENCOUNTER
I'm sorry, I did not finish. I was interrupted and didn't realize I routed.   The MRI cervical spine needs updated to w/o contrast.

## 2019-06-20 DIAGNOSIS — M54.2 NECK PAIN: Primary | ICD-10-CM

## 2019-06-26 NOTE — PROGRESS NOTES
Deanna harrison [address]     Ph: [phone]  Fax: [phone]    [] Certification  [] Recertification []  Plan of Care  [] Progress Note [x] Discharge      To:  Referring Practitioner: Brittany Sarmiento      From:  Scottie Bletran, PT, DPT   Patient: Bola Dickinson     : 1990  Diagnosis: Arthralgia Unspecified joint, muscle pain     Date: 2019  Treatment Diagnosis: neck pain, B Le pain, knee pain, decreased cervical, lumbar flex, and B Hip ER arom, decreased postural awareness, decreased B periscpaular, hip, and core strength     Progress Report Period from: 2019  to 6/10/2019    Total # of Visits to Date: 7   No Show: 0    Canceled Appointment: 1     OBJECTIVE: (measurements taken 6/10/19)  Short Term Goals - Time Frame for Short term goals: 2-3 weeks     Goals Current/Discharge status  Met   Short term goal 1: The pt will demonstrate improved postural awareness requiring <25% VC's with exercises  Improved with contd min deviations and VC's required <25%  [x] yes  [] no     Long Term Goals -    Goals Current/ Discharge status Met   Long term goal 1: The pt will demo improved B hip strength 5/5 and core strength to fair in order to safely ambulate with decreased pain/limitations  Strength RLE  Comment: hip flex, ext 5/5 hip abd 4+/5   Strength LLE  Comment: hip flex/ext 5/5 hip abd   Strength Other  Other: poor abdominal strength   [x] yes  [x] no   Long term goal 2: The pt will demo improved lumbar flex, B hip ER, and cervical flex/ext and L Rot AROM to WFL's in order to increase ease with ADL's AROM RLE (degrees)  RLE General AROM: Hip ER 36 (omega taken 19)     AROM LLE (degrees)  LLE General AROM: Hip ER 32(omega taken 19)     Spine  Cervical: flex 37 ext 34 Rot L 64 (pain at end range ext; omega taken 19)  Lumbar: flex 75% (omega taken 19) [x] yes  [x] no   Long term goal 3: The pt will have a increase in LEFS score >/=10 points in order to increase functional activity tolerance 40/80 [] yes  [x] no   Long term goal 4: The pt will be indep/compliant with HEP in order to self manage symptoms upon D/C Indep/compliant with hep  [x] yes  [] no   Long term goal 5: The pt will demonstrate improved B periscapular strength >/=4+/5 in order to lift/carry with decreased pain  Strength RUE  Comment: Rhomboid 4+/5, MT, LT, Lats 4/5   Strength LUE  Comment: MT/Rhomboids 4/5, LT, Lats 4-/5  [] yes  [x] no      Body structures, Functions, Activity limitations: Decreased functional mobility , Decreased ROM, Decreased strength, Increased Pain  Assessment: Please refer to PN dated 6/10/19 for formal assessment. Pt reports seeing neurosurgeon and has f/u with MD 7/9/19. D/C skilled PT at this time. PLAN: D/C         Signature: Electronically signed by Chelle Corral PT on 6/26/2019 at 12:44 PM    If you have any questions or concerns, please don't hesitate to call. Thank you for your referral.    I have reviewed this plan of care and certify a need for medically necessary rehabilitation services.     Physician Signature:__________________________________________________________  Date:  Please sign and return

## 2019-07-09 ENCOUNTER — TELEPHONE (OUTPATIENT)
Dept: FAMILY MEDICINE CLINIC | Age: 29
End: 2019-07-09

## 2019-07-09 ENCOUNTER — NURSE ONLY (OUTPATIENT)
Dept: FAMILY MEDICINE CLINIC | Age: 29
End: 2019-07-09
Payer: COMMERCIAL

## 2019-07-09 ENCOUNTER — OFFICE VISIT (OUTPATIENT)
Dept: FAMILY MEDICINE CLINIC | Age: 29
End: 2019-07-09
Payer: COMMERCIAL

## 2019-07-09 VITALS
TEMPERATURE: 98.3 F | HEIGHT: 66 IN | RESPIRATION RATE: 15 BRPM | HEART RATE: 95 BPM | WEIGHT: 200 LBS | BODY MASS INDEX: 32.14 KG/M2 | DIASTOLIC BLOOD PRESSURE: 80 MMHG | SYSTOLIC BLOOD PRESSURE: 138 MMHG | OXYGEN SATURATION: 98 %

## 2019-07-09 DIAGNOSIS — G89.29 CHRONIC LOW BACK PAIN WITH RIGHT-SIDED SCIATICA, UNSPECIFIED BACK PAIN LATERALITY: ICD-10-CM

## 2019-07-09 DIAGNOSIS — M54.41 CHRONIC LOW BACK PAIN WITH RIGHT-SIDED SCIATICA, UNSPECIFIED BACK PAIN LATERALITY: ICD-10-CM

## 2019-07-09 DIAGNOSIS — M54.12 CERVICAL RADICULOPATHY: Primary | ICD-10-CM

## 2019-07-09 DIAGNOSIS — M48.02 FORAMINAL STENOSIS OF CERVICAL REGION: ICD-10-CM

## 2019-07-09 LAB
BILIRUBIN URINE: NEGATIVE
BLOOD, URINE: NEGATIVE
CLARITY: CLEAR
COLOR: YELLOW
CONTROL: NORMAL
GLUCOSE URINE: NEGATIVE MG/DL
KETONES, URINE: NEGATIVE MG/DL
LEUKOCYTE ESTERASE, URINE: NEGATIVE
NITRITE, URINE: NEGATIVE
PH UA: 6 (ref 5–9)
PREGNANCY TEST URINE, POC: NEGATIVE
PROTEIN UA: NEGATIVE MG/DL
SPECIFIC GRAVITY UA: 1.02 (ref 1–1.03)
UROBILINOGEN, URINE: 0.2 E.U./DL

## 2019-07-09 PROCEDURE — G8427 DOCREV CUR MEDS BY ELIG CLIN: HCPCS | Performed by: INTERNAL MEDICINE

## 2019-07-09 PROCEDURE — G8417 CALC BMI ABV UP PARAM F/U: HCPCS | Performed by: INTERNAL MEDICINE

## 2019-07-09 PROCEDURE — 1036F TOBACCO NON-USER: CPT | Performed by: INTERNAL MEDICINE

## 2019-07-09 PROCEDURE — 72110 X-RAY EXAM L-2 SPINE 4/>VWS: CPT | Performed by: INTERNAL MEDICINE

## 2019-07-09 PROCEDURE — 81025 URINE PREGNANCY TEST: CPT | Performed by: INTERNAL MEDICINE

## 2019-07-09 PROCEDURE — 99214 OFFICE O/P EST MOD 30 MIN: CPT | Performed by: INTERNAL MEDICINE

## 2019-07-09 RX ORDER — ACETAMINOPHEN 160 MG
1 TABLET,DISINTEGRATING ORAL DAILY
Qty: 90 CAPSULE | Refills: 0 | Status: SHIPPED | OUTPATIENT
Start: 2019-07-09 | End: 2019-11-06 | Stop reason: ALTCHOICE

## 2019-07-09 ASSESSMENT — ENCOUNTER SYMPTOMS
BACK PAIN: 1
SHORTNESS OF BREATH: 0
ABDOMINAL PAIN: 0
EYE PAIN: 0

## 2019-07-11 ENCOUNTER — NURSE ONLY (OUTPATIENT)
Dept: FAMILY MEDICINE CLINIC | Age: 29
End: 2019-07-11

## 2019-07-11 ENCOUNTER — TELEPHONE (OUTPATIENT)
Dept: FAMILY MEDICINE CLINIC | Age: 29
End: 2019-07-11

## 2019-07-11 VITALS — SYSTOLIC BLOOD PRESSURE: 138 MMHG | DIASTOLIC BLOOD PRESSURE: 60 MMHG

## 2019-07-12 DIAGNOSIS — M54.5 CHRONIC LOW BACK PAIN, UNSPECIFIED BACK PAIN LATERALITY, WITH SCIATICA PRESENCE UNSPECIFIED: Primary | ICD-10-CM

## 2019-07-12 DIAGNOSIS — G89.29 CHRONIC LOW BACK PAIN, UNSPECIFIED BACK PAIN LATERALITY, WITH SCIATICA PRESENCE UNSPECIFIED: Primary | ICD-10-CM

## 2019-07-24 ENCOUNTER — HOSPITAL ENCOUNTER (OUTPATIENT)
Dept: MRI IMAGING | Age: 29
Discharge: HOME | End: 2019-07-26
Payer: COMMERCIAL

## 2019-07-24 DIAGNOSIS — M54.5 CHRONIC LOW BACK PAIN, UNSPECIFIED BACK PAIN LATERALITY, WITH SCIATICA PRESENCE UNSPECIFIED: ICD-10-CM

## 2019-07-24 DIAGNOSIS — G89.29 CHRONIC LOW BACK PAIN, UNSPECIFIED BACK PAIN LATERALITY, WITH SCIATICA PRESENCE UNSPECIFIED: ICD-10-CM

## 2019-07-24 PROCEDURE — 72148 MRI LUMBAR SPINE W/O DYE: CPT

## 2019-08-21 ENCOUNTER — OFFICE VISIT (OUTPATIENT)
Dept: FAMILY MEDICINE CLINIC | Age: 29
End: 2019-08-21
Payer: COMMERCIAL

## 2019-08-21 VITALS
BODY MASS INDEX: 31.66 KG/M2 | DIASTOLIC BLOOD PRESSURE: 82 MMHG | OXYGEN SATURATION: 99 % | HEART RATE: 91 BPM | SYSTOLIC BLOOD PRESSURE: 120 MMHG | TEMPERATURE: 97.3 F | WEIGHT: 197 LBS | HEIGHT: 66 IN

## 2019-08-21 DIAGNOSIS — M54.12 CERVICAL RADICULOPATHY AT C5: Primary | ICD-10-CM

## 2019-08-21 DIAGNOSIS — R09.89 ABNORMAL PULSE: ICD-10-CM

## 2019-08-21 DIAGNOSIS — M54.42 CHRONIC BILATERAL LOW BACK PAIN WITH BILATERAL SCIATICA: ICD-10-CM

## 2019-08-21 DIAGNOSIS — G89.29 CHRONIC BILATERAL LOW BACK PAIN WITH BILATERAL SCIATICA: ICD-10-CM

## 2019-08-21 DIAGNOSIS — M54.41 CHRONIC BILATERAL LOW BACK PAIN WITH BILATERAL SCIATICA: ICD-10-CM

## 2019-08-21 PROCEDURE — G8417 CALC BMI ABV UP PARAM F/U: HCPCS | Performed by: INTERNAL MEDICINE

## 2019-08-21 PROCEDURE — 1036F TOBACCO NON-USER: CPT | Performed by: INTERNAL MEDICINE

## 2019-08-21 PROCEDURE — 99214 OFFICE O/P EST MOD 30 MIN: CPT | Performed by: INTERNAL MEDICINE

## 2019-08-21 PROCEDURE — G8427 DOCREV CUR MEDS BY ELIG CLIN: HCPCS | Performed by: INTERNAL MEDICINE

## 2019-08-21 ASSESSMENT — ENCOUNTER SYMPTOMS
SHORTNESS OF BREATH: 0
EYE PAIN: 0
BACK PAIN: 1
ABDOMINAL PAIN: 0

## 2019-08-21 NOTE — PROGRESS NOTES
Male   Lifestyle    Physical activity:     Days per week: Not on file     Minutes per session: Not on file    Stress: Not on file   Relationships    Social connections:     Talks on phone: Not on file     Gets together: Not on file     Attends Alevism service: Not on file     Active member of club or organization: Not on file     Attends meetings of clubs or organizations: Not on file     Relationship status: Not on file    Intimate partner violence:     Fear of current or ex partner: Not on file     Emotionally abused: Not on file     Physically abused: Not on file     Forced sexual activity: Not on file   Other Topics Concern    Not on file   Social History Narrative    Not on file     Allergies   Allergen Reactions    Geodon [Ziprasidone Hcl]     Onion     Seasonal Other (See Comments)     Current Outpatient Medications on File Prior to Visit   Medication Sig Dispense Refill    Cholecalciferol (VITAMIN D3) 2000 units CAPS Take 1 capsule by mouth daily 90 capsule 0    VENTOLIN  (90 Base) MCG/ACT inhaler inhale 2 puffs by mouth four times a day if needed  0     No current facility-administered medications on file prior to visit. I have personally reviewed the ROS, PMH, PFH, and social history     Review of Systems   Constitutional: Negative for chills and fever. HENT: Negative for congestion. Eyes: Negative for pain. Respiratory: Negative for shortness of breath. Cardiovascular: Negative for chest pain. Gastrointestinal: Negative for abdominal pain. Genitourinary: Negative for hematuria. Musculoskeletal: Positive for back pain. +leg pain   Pain shoots down left arm    Allergic/Immunologic: Negative for immunocompromised state. Neurological: Negative for headaches. Psychiatric/Behavioral: Negative for hallucinations.        Objective:   /82 (Site: Left Upper Arm, Position: Sitting, Cuff Size: Medium Adult)   Pulse 91   Temp 97.3 °F (36.3 °C) (Tympanic) Ht 5' 6\" (1.676 m)   Wt 197 lb (89.4 kg)   SpO2 99%   BMI 31.80 kg/m²     Physical Exam   Constitutional: She is oriented to person, place, and time. She appears well-developed and well-nourished. HENT:   Head: Normocephalic. Eyes: Pupils are equal, round, and reactive to light. Neck: No tracheal deviation present. Cardiovascular: Normal rate, regular rhythm and normal heart sounds. Exam reveals no gallop and no friction rub. No murmur heard. 1+ dp on right  2+ dp on left and bl 2+ pt    Pulmonary/Chest: No respiratory distress. Abdominal: Soft. Bowel sounds are normal. She exhibits no distension. There is no rebound. Musculoskeletal: She exhibits no edema. Feet without ulcers    Neurological: She is oriented to person, place, and time. She displays normal reflexes. No sensory deficit. She exhibits normal muscle tone. No saddle anesthesia  5/5 muscle strength in bl hip ext/flexors, knee flex/extensors, and plantar and dorsiflexion  Negative Straight leg test  Sensation intact to soft touch and prick   No point tenderness over spinous processes. 2+ patellar and  And absent achilles reflexes bl   No palpable step off   Slightly tender of right paraspinal    Skin: Skin is warm and dry. Assessment:       Diagnosis Orders   1. Cervical radiculopathy at C5     2. Chronic bilateral low back pain with bilateral sciatica  EMG    US RETROPERITONEAL COMPLETE   3. Abnormal pulse  US ROSEANNA DEBRA REST AND POST TREAD COMPLETE         Plan:   C5-6 left foraminal stenosis with nerve root compression  Plans to see Dr. Tao Santana 8/28/19  Might end up having foraminotomy   Pain is no better  She failed dexamethasone injection, per patient made pain worse. , now it's back to baseline.    MRI was more or less unremarkable, you can also discuss with Dr. Dillard Ok  Will add Bl lower extremity EMG  Please follow up with pain management if no clear cause might have her seen neurology after this and or rheumatology  Renal us

## 2019-09-03 DIAGNOSIS — M25.50 ARTHRALGIA, UNSPECIFIED JOINT: Primary | ICD-10-CM

## 2019-09-06 ENCOUNTER — HOSPITAL ENCOUNTER (OUTPATIENT)
Dept: ULTRASOUND IMAGING | Age: 29
Discharge: HOME | End: 2019-09-08
Payer: COMMERCIAL

## 2019-09-06 DIAGNOSIS — G89.29 CHRONIC BILATERAL LOW BACK PAIN WITH BILATERAL SCIATICA: ICD-10-CM

## 2019-09-06 DIAGNOSIS — M54.42 CHRONIC BILATERAL LOW BACK PAIN WITH BILATERAL SCIATICA: ICD-10-CM

## 2019-09-06 DIAGNOSIS — M54.41 CHRONIC BILATERAL LOW BACK PAIN WITH BILATERAL SCIATICA: ICD-10-CM

## 2019-09-06 PROCEDURE — 76775 US EXAM ABDO BACK WALL LIM: CPT

## 2019-09-11 ENCOUNTER — HOSPITAL ENCOUNTER (OUTPATIENT)
Dept: NEUROLOGY | Age: 29
Discharge: HOME | End: 2019-09-11
Payer: COMMERCIAL

## 2019-09-11 DIAGNOSIS — M54.42 CHRONIC BILATERAL LOW BACK PAIN WITH BILATERAL SCIATICA: ICD-10-CM

## 2019-09-11 DIAGNOSIS — M54.41 CHRONIC BILATERAL LOW BACK PAIN WITH BILATERAL SCIATICA: ICD-10-CM

## 2019-09-11 DIAGNOSIS — G89.29 CHRONIC BILATERAL LOW BACK PAIN WITH BILATERAL SCIATICA: ICD-10-CM

## 2019-09-11 PROCEDURE — 95886 MUSC TEST DONE W/N TEST COMP: CPT

## 2019-09-11 PROCEDURE — 95910 NRV CNDJ TEST 7-8 STUDIES: CPT

## 2019-09-13 ENCOUNTER — HOSPITAL ENCOUNTER (OUTPATIENT)
Dept: ULTRASOUND IMAGING | Age: 29
Discharge: HOME | End: 2019-09-15
Payer: COMMERCIAL

## 2019-09-13 DIAGNOSIS — R09.89 ABNORMAL PULSE: ICD-10-CM

## 2019-09-13 PROCEDURE — 93924 LWR XTR VASC STDY BILAT: CPT

## 2019-09-13 PROCEDURE — 93924 LWR XTR VASC STDY BILAT: CPT | Performed by: INTERNAL MEDICINE

## 2019-09-18 ENCOUNTER — HOSPITAL ENCOUNTER (OUTPATIENT)
Dept: ULTRASOUND IMAGING | Age: 29
Discharge: HOME | End: 2019-09-20
Payer: COMMERCIAL

## 2019-09-18 ENCOUNTER — OFFICE VISIT (OUTPATIENT)
Dept: FAMILY MEDICINE CLINIC | Age: 29
End: 2019-09-18
Payer: COMMERCIAL

## 2019-09-18 VITALS
SYSTOLIC BLOOD PRESSURE: 130 MMHG | RESPIRATION RATE: 15 BRPM | OXYGEN SATURATION: 99 % | HEART RATE: 78 BPM | TEMPERATURE: 97.6 F | HEIGHT: 66 IN | WEIGHT: 196 LBS | BODY MASS INDEX: 31.5 KG/M2 | DIASTOLIC BLOOD PRESSURE: 78 MMHG

## 2019-09-18 DIAGNOSIS — M79.605 CHRONIC PAIN OF BOTH LOWER EXTREMITIES: ICD-10-CM

## 2019-09-18 DIAGNOSIS — M79.662 BILATERAL CALF PAIN: ICD-10-CM

## 2019-09-18 DIAGNOSIS — M79.661 BILATERAL CALF PAIN: ICD-10-CM

## 2019-09-18 DIAGNOSIS — M25.562 CHRONIC PAIN OF BOTH KNEES: ICD-10-CM

## 2019-09-18 DIAGNOSIS — Z11.4 ENCOUNTER FOR SCREENING FOR HIV: ICD-10-CM

## 2019-09-18 DIAGNOSIS — M25.561 CHRONIC PAIN OF BOTH KNEES: ICD-10-CM

## 2019-09-18 DIAGNOSIS — M79.604 CHRONIC PAIN OF BOTH LOWER EXTREMITIES: ICD-10-CM

## 2019-09-18 DIAGNOSIS — M54.12 CERVICAL RADICULOPATHY: ICD-10-CM

## 2019-09-18 DIAGNOSIS — G89.29 CHRONIC LOW BACK PAIN WITHOUT SCIATICA, UNSPECIFIED BACK PAIN LATERALITY: ICD-10-CM

## 2019-09-18 DIAGNOSIS — E55.9 VITAMIN D DEFICIENCY: ICD-10-CM

## 2019-09-18 DIAGNOSIS — G89.29 CHRONIC PAIN OF BOTH LOWER EXTREMITIES: ICD-10-CM

## 2019-09-18 DIAGNOSIS — G89.29 CHRONIC PAIN OF BOTH KNEES: ICD-10-CM

## 2019-09-18 DIAGNOSIS — M54.50 CHRONIC LOW BACK PAIN WITHOUT SCIATICA, UNSPECIFIED BACK PAIN LATERALITY: ICD-10-CM

## 2019-09-18 DIAGNOSIS — M54.12 CERVICAL RADICULOPATHY: Primary | ICD-10-CM

## 2019-09-18 LAB — HEPATITIS C ANTIBODY INTERPRETATION: NORMAL

## 2019-09-18 PROCEDURE — 1036F TOBACCO NON-USER: CPT | Performed by: INTERNAL MEDICINE

## 2019-09-18 PROCEDURE — G8427 DOCREV CUR MEDS BY ELIG CLIN: HCPCS | Performed by: INTERNAL MEDICINE

## 2019-09-18 PROCEDURE — 99214 OFFICE O/P EST MOD 30 MIN: CPT | Performed by: INTERNAL MEDICINE

## 2019-09-18 PROCEDURE — G8417 CALC BMI ABV UP PARAM F/U: HCPCS | Performed by: INTERNAL MEDICINE

## 2019-09-18 PROCEDURE — 93970 EXTREMITY STUDY: CPT

## 2019-09-18 ASSESSMENT — ENCOUNTER SYMPTOMS
BACK PAIN: 1
SHORTNESS OF BREATH: 0
ABDOMINAL PAIN: 0
EYE PAIN: 0

## 2019-09-20 LAB — HIV 1,2 COMBO ANTIGEN/ANTIBODY: NEGATIVE

## 2019-09-23 ENCOUNTER — OFFICE VISIT (OUTPATIENT)
Dept: OBGYN CLINIC | Age: 29
End: 2019-09-23
Payer: COMMERCIAL

## 2019-09-23 VITALS
HEIGHT: 66 IN | DIASTOLIC BLOOD PRESSURE: 80 MMHG | BODY MASS INDEX: 30.7 KG/M2 | HEART RATE: 84 BPM | SYSTOLIC BLOOD PRESSURE: 122 MMHG | WEIGHT: 191 LBS

## 2019-09-23 DIAGNOSIS — Z01.419 VISIT FOR GYNECOLOGIC EXAMINATION: Primary | ICD-10-CM

## 2019-09-23 DIAGNOSIS — Z11.51 SCREENING FOR HPV (HUMAN PAPILLOMAVIRUS): ICD-10-CM

## 2019-09-23 DIAGNOSIS — N89.8 VAGINAL ITCHING: ICD-10-CM

## 2019-09-23 PROCEDURE — 1036F TOBACCO NON-USER: CPT | Performed by: OBSTETRICS & GYNECOLOGY

## 2019-09-23 PROCEDURE — G8417 CALC BMI ABV UP PARAM F/U: HCPCS | Performed by: OBSTETRICS & GYNECOLOGY

## 2019-09-23 PROCEDURE — 99395 PREV VISIT EST AGE 18-39: CPT | Performed by: OBSTETRICS & GYNECOLOGY

## 2019-09-23 PROCEDURE — G8427 DOCREV CUR MEDS BY ELIG CLIN: HCPCS | Performed by: OBSTETRICS & GYNECOLOGY

## 2019-09-23 RX ORDER — METRONIDAZOLE 500 MG/1
500 TABLET ORAL 2 TIMES DAILY
Qty: 14 TABLET | Refills: 0 | Status: SHIPPED | OUTPATIENT
Start: 2019-09-23 | End: 2019-09-30

## 2019-09-23 RX ORDER — FLUCONAZOLE 150 MG/1
TABLET ORAL
Qty: 3 TABLET | Refills: 0 | Status: SHIPPED | OUTPATIENT
Start: 2019-09-23 | End: 2019-11-05

## 2019-09-23 NOTE — PROGRESS NOTES
file     Non-medical: Not on file   Tobacco Use    Smoking status: Never Smoker    Smokeless tobacco: Never Used   Substance and Sexual Activity    Alcohol use: No     Alcohol/week: 0.0 standard drinks    Drug use: No    Sexual activity: Yes     Partners: Male   Lifestyle    Physical activity:     Days per week: Not on file     Minutes per session: Not on file    Stress: Not on file   Relationships    Social connections:     Talks on phone: Not on file     Gets together: Not on file     Attends Mormonism service: Not on file     Active member of club or organization: Not on file     Attends meetings of clubs or organizations: Not on file     Relationship status: Not on file    Intimate partner violence:     Fear of current or ex partner: Not on file     Emotionally abused: Not on file     Physically abused: Not on file     Forced sexual activity: Not on file   Other Topics Concern    Not on file   Social History Narrative    Not on file       Gynecologic History  Patient's last menstrual period was 2019. Last Pap: 2 years  Results: normal  Last Mammogram: Not Indicated Results: N/A  FH breast cancer : negative ,     OB History        3    Para   1    Term   1       0    AB   2    Living   1       SAB   2    TAB   0    Ectopic   0    Molar        Multiple   0    Live Births   1              Patient's medications, allergies, past medical, surgical, social and family histories were reviewed and updated as appropriate. Review of Systems  Review of Systems  Review of Systems   Constitutional: Negative for chills and fever. Respiratory: Negative for cough and shortnessof breath. Cardiovascular: Negative for chest pain and palpitations. Gastrointestinal: Negative for nausea and vomiting. Genitourinary: Negative for dysuria,frequency and urgency. Musculoskeletal: Negative for myalgias. Neurological: Negative for dizziness, seizures and headaches.    Psychiatric/Behavioral:

## 2019-09-25 ENCOUNTER — TELEPHONE (OUTPATIENT)
Dept: OBGYN CLINIC | Age: 29
End: 2019-09-25

## 2019-09-25 ENCOUNTER — OFFICE VISIT (OUTPATIENT)
Dept: FAMILY MEDICINE CLINIC | Age: 29
End: 2019-09-25
Payer: COMMERCIAL

## 2019-09-25 VITALS
SYSTOLIC BLOOD PRESSURE: 132 MMHG | RESPIRATION RATE: 15 BRPM | BODY MASS INDEX: 30.7 KG/M2 | WEIGHT: 191 LBS | OXYGEN SATURATION: 98 % | DIASTOLIC BLOOD PRESSURE: 84 MMHG | HEIGHT: 66 IN | HEART RATE: 80 BPM | TEMPERATURE: 97.8 F

## 2019-09-25 DIAGNOSIS — Z78.9 MEDICATION INTOLERANCE: ICD-10-CM

## 2019-09-25 DIAGNOSIS — R11.0 NAUSEA: Primary | ICD-10-CM

## 2019-09-25 PROCEDURE — G8417 CALC BMI ABV UP PARAM F/U: HCPCS | Performed by: NURSE PRACTITIONER

## 2019-09-25 PROCEDURE — G8427 DOCREV CUR MEDS BY ELIG CLIN: HCPCS | Performed by: NURSE PRACTITIONER

## 2019-09-25 PROCEDURE — 1036F TOBACCO NON-USER: CPT | Performed by: NURSE PRACTITIONER

## 2019-09-25 PROCEDURE — 99213 OFFICE O/P EST LOW 20 MIN: CPT | Performed by: NURSE PRACTITIONER

## 2019-09-25 RX ORDER — METRONIDAZOLE 250 MG/1
TABLET ORAL
Refills: 0 | COMMUNITY
Start: 2019-09-23 | End: 2019-11-05

## 2019-09-25 RX ORDER — ONDANSETRON 4 MG/1
4 TABLET, ORALLY DISINTEGRATING ORAL EVERY 8 HOURS PRN
Qty: 21 TABLET | Refills: 0 | Status: SHIPPED | OUTPATIENT
Start: 2019-09-25 | End: 2019-10-02

## 2019-09-25 ASSESSMENT — ENCOUNTER SYMPTOMS
BLOOD IN STOOL: 0
DIARRHEA: 1
ABDOMINAL PAIN: 1
VOMITING: 0
SHORTNESS OF BREATH: 0

## 2019-09-25 NOTE — TELEPHONE ENCOUNTER
Pt was put on flagyl, she stated that it is making her sick. Her s/s nausea, vomitting, dizziness, diarrhea , and cant sleep. Please advise.

## 2019-09-28 ENCOUNTER — PATIENT MESSAGE (OUTPATIENT)
Dept: FAMILY MEDICINE CLINIC | Age: 29
End: 2019-09-28

## 2019-09-28 LAB
CHLAMYDIA TRACHOMATIS AMPLIFIED DET: NORMAL
N GONORRHOEAE AMPLIFIED DET: NORMAL
PAP SMEAR: NORMAL

## 2019-09-28 RX ORDER — FLUCONAZOLE 150 MG/1
TABLET ORAL
Qty: 3 TABLET | Refills: 0 | Status: SHIPPED | OUTPATIENT
Start: 2019-09-28 | End: 2019-10-05

## 2019-09-28 RX ORDER — CLINDAMYCIN HYDROCHLORIDE 300 MG/1
300 CAPSULE ORAL 2 TIMES DAILY
Qty: 14 CAPSULE | Refills: 0 | Status: SHIPPED | OUTPATIENT
Start: 2019-09-28 | End: 2019-10-05

## 2019-09-28 NOTE — TELEPHONE ENCOUNTER
From: Jett Angulo  To: MARIAMA Garrett - CNP  Sent: 9/28/2019 2:09 PM EDT  Subject: Prescription Question    I do believe I need more doses of the yeast infection stuff since I took the last pill yesterday and am still itchy.  I have not heard back from Dr Adry Fleming yet whether I have bv or not

## 2019-09-30 RX ORDER — METRONIDAZOLE 7.5 MG/G
GEL VAGINAL
Qty: 1 TUBE | Refills: 0 | Status: SHIPPED | OUTPATIENT
Start: 2019-09-30 | End: 2019-10-07

## 2019-10-03 RX ORDER — METRONIDAZOLE 500 MG/1
500 TABLET ORAL 2 TIMES DAILY
Qty: 14 TABLET | Refills: 0 | Status: SHIPPED | OUTPATIENT
Start: 2019-10-03 | End: 2019-10-10

## 2019-10-10 ASSESSMENT — ENCOUNTER SYMPTOMS
ABDOMINAL DISTENTION: 0
TROUBLE SWALLOWING: 0
WHEEZING: 0
FACIAL SWELLING: 0

## 2019-11-05 ENCOUNTER — HOSPITAL ENCOUNTER (OUTPATIENT)
Dept: PREADMISSION TESTING | Age: 29
Discharge: HOME | End: 2019-11-09
Payer: COMMERCIAL

## 2019-11-05 VITALS
DIASTOLIC BLOOD PRESSURE: 61 MMHG | HEIGHT: 67 IN | OXYGEN SATURATION: 98 % | TEMPERATURE: 97.2 F | SYSTOLIC BLOOD PRESSURE: 123 MMHG | BODY MASS INDEX: 30.39 KG/M2 | WEIGHT: 193.6 LBS | RESPIRATION RATE: 16 BRPM | HEART RATE: 87 BPM

## 2019-11-05 DIAGNOSIS — M48.02 FORAMINAL STENOSIS OF CERVICAL REGION: ICD-10-CM

## 2019-11-05 DIAGNOSIS — R56.9 SEIZURES (HCC): ICD-10-CM

## 2019-11-05 DIAGNOSIS — M47.22 CERVICAL SPONDYLOSIS WITH RADICULOPATHY: ICD-10-CM

## 2019-11-05 LAB
ANION GAP SERPL CALCULATED.3IONS-SCNC: 12 MEQ/L (ref 9–15)
BUN BLDV-MCNC: 14 MG/DL (ref 6–20)
CALCIUM SERPL-MCNC: 9.3 MG/DL (ref 8.5–9.9)
CHLORIDE BLD-SCNC: 103 MEQ/L (ref 95–107)
CO2: 25 MEQ/L (ref 20–31)
CREAT SERPL-MCNC: 0.77 MG/DL (ref 0.5–0.9)
GFR AFRICAN AMERICAN: >60
GFR NON-AFRICAN AMERICAN: >60
GLUCOSE BLD-MCNC: 79 MG/DL (ref 70–99)
HCT VFR BLD CALC: 41.2 % (ref 37–47)
HEMOGLOBIN: 13.7 G/DL (ref 12–16)
INR BLD: 0.9
MCH RBC QN AUTO: 31.1 PG (ref 27–31.3)
MCHC RBC AUTO-ENTMCNC: 33.3 % (ref 33–37)
MCV RBC AUTO: 93.3 FL (ref 82–100)
PDW BLD-RTO: 12.8 % (ref 11.5–14.5)
PLATELET # BLD: 271 K/UL (ref 130–400)
POTASSIUM SERPL-SCNC: 3.8 MEQ/L (ref 3.4–4.9)
PROTHROMBIN TIME: 12.7 SEC (ref 12.3–14.9)
RBC # BLD: 4.42 M/UL (ref 4.2–5.4)
SODIUM BLD-SCNC: 140 MEQ/L (ref 135–144)
WBC # BLD: 8.7 K/UL (ref 4.8–10.8)

## 2019-11-05 PROCEDURE — 86900 BLOOD TYPING SEROLOGIC ABO: CPT

## 2019-11-05 PROCEDURE — 85610 PROTHROMBIN TIME: CPT

## 2019-11-05 PROCEDURE — 86901 BLOOD TYPING SEROLOGIC RH(D): CPT

## 2019-11-05 PROCEDURE — 85027 COMPLETE CBC AUTOMATED: CPT

## 2019-11-05 PROCEDURE — 80048 BASIC METABOLIC PNL TOTAL CA: CPT

## 2019-11-05 PROCEDURE — 86850 RBC ANTIBODY SCREEN: CPT

## 2019-11-05 RX ORDER — SODIUM CHLORIDE, SODIUM LACTATE, POTASSIUM CHLORIDE, CALCIUM CHLORIDE 600; 310; 30; 20 MG/100ML; MG/100ML; MG/100ML; MG/100ML
INJECTION, SOLUTION INTRAVENOUS CONTINUOUS
Status: CANCELLED | OUTPATIENT
Start: 2019-11-12

## 2019-11-05 RX ORDER — SODIUM CHLORIDE 0.9 % (FLUSH) 0.9 %
10 SYRINGE (ML) INJECTION PRN
Status: CANCELLED | OUTPATIENT
Start: 2019-11-12

## 2019-11-05 RX ORDER — SODIUM CHLORIDE 0.9 % (FLUSH) 0.9 %
10 SYRINGE (ML) INJECTION EVERY 12 HOURS SCHEDULED
Status: CANCELLED | OUTPATIENT
Start: 2019-11-12

## 2019-11-05 RX ORDER — AMITRIPTYLINE HYDROCHLORIDE 10 MG/1
10 TABLET, FILM COATED ORAL NIGHTLY
COMMUNITY
End: 2020-04-07 | Stop reason: CLARIF

## 2019-11-05 RX ORDER — LIDOCAINE HYDROCHLORIDE 10 MG/ML
1 INJECTION, SOLUTION EPIDURAL; INFILTRATION; INTRACAUDAL; PERINEURAL
Status: CANCELLED | OUTPATIENT
Start: 2019-11-12 | End: 2019-11-12

## 2019-11-05 ASSESSMENT — ENCOUNTER SYMPTOMS
DIARRHEA: 0
BACK PAIN: 0
ALLERGIC/IMMUNOLOGIC NEGATIVE: 1
CONSTIPATION: 0
SHORTNESS OF BREATH: 0
ABDOMINAL PAIN: 0
STRIDOR: 0
CHEST TIGHTNESS: 0
COUGH: 0
EYES NEGATIVE: 1
NAUSEA: 0
WHEEZING: 0
VOMITING: 0
TROUBLE SWALLOWING: 0
SORE THROAT: 0

## 2019-11-06 ENCOUNTER — OFFICE VISIT (OUTPATIENT)
Dept: FAMILY MEDICINE CLINIC | Age: 29
End: 2019-11-06
Payer: COMMERCIAL

## 2019-11-06 VITALS
DIASTOLIC BLOOD PRESSURE: 92 MMHG | RESPIRATION RATE: 15 BRPM | WEIGHT: 192 LBS | BODY MASS INDEX: 30.13 KG/M2 | HEART RATE: 98 BPM | SYSTOLIC BLOOD PRESSURE: 132 MMHG | TEMPERATURE: 98.3 F | OXYGEN SATURATION: 99 % | HEIGHT: 67 IN

## 2019-11-06 DIAGNOSIS — M79.7 FIBROMYALGIA: ICD-10-CM

## 2019-11-06 DIAGNOSIS — M79.605 PAIN IN BOTH LOWER EXTREMITIES: ICD-10-CM

## 2019-11-06 DIAGNOSIS — M79.604 PAIN IN BOTH LOWER EXTREMITIES: ICD-10-CM

## 2019-11-06 DIAGNOSIS — M54.12 CERVICAL RADICULOPATHY: Primary | ICD-10-CM

## 2019-11-06 LAB
ABO/RH: NORMAL
ANTIBODY SCREEN: NORMAL

## 2019-11-06 PROCEDURE — G8417 CALC BMI ABV UP PARAM F/U: HCPCS | Performed by: INTERNAL MEDICINE

## 2019-11-06 PROCEDURE — 99214 OFFICE O/P EST MOD 30 MIN: CPT | Performed by: INTERNAL MEDICINE

## 2019-11-06 PROCEDURE — G8484 FLU IMMUNIZE NO ADMIN: HCPCS | Performed by: INTERNAL MEDICINE

## 2019-11-06 PROCEDURE — 93000 ELECTROCARDIOGRAM COMPLETE: CPT | Performed by: INTERNAL MEDICINE

## 2019-11-06 PROCEDURE — 1036F TOBACCO NON-USER: CPT | Performed by: INTERNAL MEDICINE

## 2019-11-06 PROCEDURE — G8427 DOCREV CUR MEDS BY ELIG CLIN: HCPCS | Performed by: INTERNAL MEDICINE

## 2019-11-06 ASSESSMENT — ENCOUNTER SYMPTOMS
SHORTNESS OF BREATH: 0
BACK PAIN: 0
ABDOMINAL PAIN: 0
EYE PAIN: 0

## 2019-11-12 ENCOUNTER — HOSPITAL ENCOUNTER (OUTPATIENT)
Dept: GENERAL RADIOLOGY | Age: 29
Setting detail: OUTPATIENT SURGERY
Discharge: HOME | End: 2019-11-14
Attending: NEUROLOGICAL SURGERY
Payer: COMMERCIAL

## 2019-11-12 ENCOUNTER — ANESTHESIA (OUTPATIENT)
Dept: OPERATING ROOM | Age: 29
End: 2019-11-12
Payer: COMMERCIAL

## 2019-11-12 ENCOUNTER — ANESTHESIA EVENT (OUTPATIENT)
Dept: OPERATING ROOM | Age: 29
End: 2019-11-12
Payer: COMMERCIAL

## 2019-11-12 ENCOUNTER — HOSPITAL ENCOUNTER (OUTPATIENT)
Age: 29
Discharge: HOME | End: 2019-11-13
Attending: NEUROLOGICAL SURGERY | Admitting: NEUROLOGICAL SURGERY
Payer: COMMERCIAL

## 2019-11-12 VITALS — DIASTOLIC BLOOD PRESSURE: 58 MMHG | SYSTOLIC BLOOD PRESSURE: 100 MMHG | OXYGEN SATURATION: 100 %

## 2019-11-12 DIAGNOSIS — R52 PAIN: ICD-10-CM

## 2019-11-12 DIAGNOSIS — M47.22 CERVICAL SPONDYLOSIS WITH RADICULOPATHY: Primary | ICD-10-CM

## 2019-11-12 DIAGNOSIS — M54.12 CERVICAL RADICULOPATHY: ICD-10-CM

## 2019-11-12 PROCEDURE — 2580000003 HC RX 258: Performed by: NEUROLOGICAL SURGERY

## 2019-11-12 PROCEDURE — 6360000002 HC RX W HCPCS: Performed by: NEUROLOGICAL SURGERY

## 2019-11-12 PROCEDURE — 7100000000 HC PACU RECOVERY - FIRST 15 MIN: Performed by: NEUROLOGICAL SURGERY

## 2019-11-12 PROCEDURE — 2580000003 HC RX 258: Performed by: NURSE PRACTITIONER

## 2019-11-12 PROCEDURE — 7100000001 HC PACU RECOVERY - ADDTL 15 MIN: Performed by: NEUROLOGICAL SURGERY

## 2019-11-12 PROCEDURE — 2500000003 HC RX 250 WO HCPCS: Performed by: NEUROLOGICAL SURGERY

## 2019-11-12 PROCEDURE — 3700000001 HC ADD 15 MINUTES (ANESTHESIA): Performed by: NEUROLOGICAL SURGERY

## 2019-11-12 PROCEDURE — 2720000010 HC SURG SUPPLY STERILE: Performed by: NEUROLOGICAL SURGERY

## 2019-11-12 PROCEDURE — 6360000002 HC RX W HCPCS: Performed by: NURSE PRACTITIONER

## 2019-11-12 PROCEDURE — 6360000002 HC RX W HCPCS: Performed by: NURSE ANESTHETIST, CERTIFIED REGISTERED

## 2019-11-12 PROCEDURE — 6360000002 HC RX W HCPCS: Performed by: ANESTHESIOLOGY

## 2019-11-12 PROCEDURE — 3600000004 HC SURGERY LEVEL 4 BASE: Performed by: NEUROLOGICAL SURGERY

## 2019-11-12 PROCEDURE — 3209999900 FLUORO FOR SURGICAL PROCEDURES

## 2019-11-12 PROCEDURE — 2709999900 HC NON-CHARGEABLE SUPPLY: Performed by: NEUROLOGICAL SURGERY

## 2019-11-12 PROCEDURE — 3700000000 HC ANESTHESIA ATTENDED CARE: Performed by: NEUROLOGICAL SURGERY

## 2019-11-12 PROCEDURE — L0120 CERV FLEX N/ADJ FOAM PRE OTS: HCPCS | Performed by: NEUROLOGICAL SURGERY

## 2019-11-12 PROCEDURE — 6370000000 HC RX 637 (ALT 250 FOR IP): Performed by: NEUROLOGICAL SURGERY

## 2019-11-12 PROCEDURE — 3600000014 HC SURGERY LEVEL 4 ADDTL 15MIN: Performed by: NEUROLOGICAL SURGERY

## 2019-11-12 PROCEDURE — 94664 DEMO&/EVAL PT USE INHALER: CPT

## 2019-11-12 PROCEDURE — 2500000003 HC RX 250 WO HCPCS: Performed by: ANESTHESIOLOGY

## 2019-11-12 PROCEDURE — 2500000003 HC RX 250 WO HCPCS: Performed by: NURSE ANESTHETIST, CERTIFIED REGISTERED

## 2019-11-12 RX ORDER — FAMOTIDINE 20 MG/1
20 TABLET, FILM COATED ORAL 2 TIMES DAILY
Status: DISCONTINUED | OUTPATIENT
Start: 2019-11-12 | End: 2019-11-13 | Stop reason: HOSPADM

## 2019-11-12 RX ORDER — DEXTROSE AND SODIUM CHLORIDE 5; .45 G/100ML; G/100ML
INJECTION, SOLUTION INTRAVENOUS CONTINUOUS
Status: DISCONTINUED | OUTPATIENT
Start: 2019-11-12 | End: 2019-11-13 | Stop reason: HOSPADM

## 2019-11-12 RX ORDER — DEXAMETHASONE SODIUM PHOSPHATE 10 MG/ML
INJECTION INTRAMUSCULAR; INTRAVENOUS PRN
Status: DISCONTINUED | OUTPATIENT
Start: 2019-11-12 | End: 2019-11-12 | Stop reason: SDUPTHER

## 2019-11-12 RX ORDER — LIDOCAINE HYDROCHLORIDE 20 MG/ML
INJECTION, SOLUTION INTRAVENOUS PRN
Status: DISCONTINUED | OUTPATIENT
Start: 2019-11-12 | End: 2019-11-12 | Stop reason: SDUPTHER

## 2019-11-12 RX ORDER — ALBUTEROL SULFATE 90 UG/1
2 AEROSOL, METERED RESPIRATORY (INHALATION) EVERY 4 HOURS PRN
Status: DISCONTINUED | OUTPATIENT
Start: 2019-11-12 | End: 2019-11-12

## 2019-11-12 RX ORDER — GINSENG 100 MG
CAPSULE ORAL PRN
Status: DISCONTINUED | OUTPATIENT
Start: 2019-11-12 | End: 2019-11-12 | Stop reason: ALTCHOICE

## 2019-11-12 RX ORDER — AMITRIPTYLINE HYDROCHLORIDE 10 MG/1
10 TABLET, FILM COATED ORAL NIGHTLY
Status: DISCONTINUED | OUTPATIENT
Start: 2019-11-12 | End: 2019-11-13 | Stop reason: HOSPADM

## 2019-11-12 RX ORDER — CEPHALEXIN 500 MG/1
500 CAPSULE ORAL EVERY 8 HOURS
Status: DISCONTINUED | OUTPATIENT
Start: 2019-11-13 | End: 2019-11-13 | Stop reason: HOSPADM

## 2019-11-12 RX ORDER — FENTANYL CITRATE 50 UG/ML
50 INJECTION, SOLUTION INTRAMUSCULAR; INTRAVENOUS EVERY 10 MIN PRN
Status: DISCONTINUED | OUTPATIENT
Start: 2019-11-12 | End: 2019-11-12 | Stop reason: HOSPADM

## 2019-11-12 RX ORDER — MEPERIDINE HYDROCHLORIDE 25 MG/ML
12.5 INJECTION INTRAMUSCULAR; INTRAVENOUS; SUBCUTANEOUS EVERY 5 MIN PRN
Status: DISCONTINUED | OUTPATIENT
Start: 2019-11-12 | End: 2019-11-12 | Stop reason: HOSPADM

## 2019-11-12 RX ORDER — MIDAZOLAM HYDROCHLORIDE 1 MG/ML
INJECTION INTRAMUSCULAR; INTRAVENOUS PRN
Status: DISCONTINUED | OUTPATIENT
Start: 2019-11-12 | End: 2019-11-12 | Stop reason: SDUPTHER

## 2019-11-12 RX ORDER — ROCURONIUM BROMIDE 10 MG/ML
INJECTION, SOLUTION INTRAVENOUS PRN
Status: DISCONTINUED | OUTPATIENT
Start: 2019-11-12 | End: 2019-11-12 | Stop reason: SDUPTHER

## 2019-11-12 RX ORDER — ALBUTEROL SULFATE 90 UG/1
2 AEROSOL, METERED RESPIRATORY (INHALATION) 3 TIMES DAILY
Status: DISCONTINUED | OUTPATIENT
Start: 2019-11-12 | End: 2019-11-13 | Stop reason: HOSPADM

## 2019-11-12 RX ORDER — HYDROCODONE BITARTRATE AND ACETAMINOPHEN 5; 325 MG/1; MG/1
1 TABLET ORAL PRN
Status: DISCONTINUED | OUTPATIENT
Start: 2019-11-12 | End: 2019-11-12 | Stop reason: HOSPADM

## 2019-11-12 RX ORDER — ONDANSETRON 2 MG/ML
INJECTION INTRAMUSCULAR; INTRAVENOUS PRN
Status: DISCONTINUED | OUTPATIENT
Start: 2019-11-12 | End: 2019-11-12 | Stop reason: SDUPTHER

## 2019-11-12 RX ORDER — DIAZEPAM 5 MG/1
5 TABLET ORAL EVERY 6 HOURS PRN
Status: DISCONTINUED | OUTPATIENT
Start: 2019-11-12 | End: 2019-11-13 | Stop reason: HOSPADM

## 2019-11-12 RX ORDER — ONDANSETRON 2 MG/ML
4 INJECTION INTRAMUSCULAR; INTRAVENOUS EVERY 6 HOURS PRN
Status: DISCONTINUED | OUTPATIENT
Start: 2019-11-12 | End: 2019-11-13 | Stop reason: HOSPADM

## 2019-11-12 RX ORDER — SODIUM CHLORIDE 9 MG/ML
INJECTION, SOLUTION INTRAVENOUS CONTINUOUS PRN
Status: DISCONTINUED | OUTPATIENT
Start: 2019-11-12 | End: 2019-11-12 | Stop reason: ALTCHOICE

## 2019-11-12 RX ORDER — DOCUSATE SODIUM 100 MG/1
100 CAPSULE, LIQUID FILLED ORAL 2 TIMES DAILY
Status: DISCONTINUED | OUTPATIENT
Start: 2019-11-12 | End: 2019-11-13 | Stop reason: HOSPADM

## 2019-11-12 RX ORDER — SODIUM CHLORIDE 0.9 % (FLUSH) 0.9 %
10 SYRINGE (ML) INJECTION EVERY 12 HOURS SCHEDULED
Status: DISCONTINUED | OUTPATIENT
Start: 2019-11-12 | End: 2019-11-12 | Stop reason: HOSPADM

## 2019-11-12 RX ORDER — METOCLOPRAMIDE HYDROCHLORIDE 5 MG/ML
10 INJECTION INTRAMUSCULAR; INTRAVENOUS
Status: DISCONTINUED | OUTPATIENT
Start: 2019-11-12 | End: 2019-11-12 | Stop reason: HOSPADM

## 2019-11-12 RX ORDER — ONDANSETRON 2 MG/ML
4 INJECTION INTRAMUSCULAR; INTRAVENOUS
Status: DISCONTINUED | OUTPATIENT
Start: 2019-11-12 | End: 2019-11-12 | Stop reason: HOSPADM

## 2019-11-12 RX ORDER — SODIUM CHLORIDE 0.9 % (FLUSH) 0.9 %
10 SYRINGE (ML) INJECTION PRN
Status: DISCONTINUED | OUTPATIENT
Start: 2019-11-12 | End: 2019-11-12 | Stop reason: ALTCHOICE

## 2019-11-12 RX ORDER — LIDOCAINE HYDROCHLORIDE 10 MG/ML
1 INJECTION, SOLUTION EPIDURAL; INFILTRATION; INTRACAUDAL; PERINEURAL
Status: COMPLETED | OUTPATIENT
Start: 2019-11-12 | End: 2019-11-12

## 2019-11-12 RX ORDER — MORPHINE SULFATE 2 MG/ML
2 INJECTION, SOLUTION INTRAMUSCULAR; INTRAVENOUS
Status: DISCONTINUED | OUTPATIENT
Start: 2019-11-12 | End: 2019-11-13 | Stop reason: HOSPADM

## 2019-11-12 RX ORDER — SODIUM CHLORIDE, SODIUM LACTATE, POTASSIUM CHLORIDE, CALCIUM CHLORIDE 600; 310; 30; 20 MG/100ML; MG/100ML; MG/100ML; MG/100ML
INJECTION, SOLUTION INTRAVENOUS CONTINUOUS
Status: DISCONTINUED | OUTPATIENT
Start: 2019-11-12 | End: 2019-11-12 | Stop reason: ALTCHOICE

## 2019-11-12 RX ORDER — PHENYLEPHRINE HYDROCHLORIDE 10 MG/ML
INJECTION INTRAVENOUS PRN
Status: DISCONTINUED | OUTPATIENT
Start: 2019-11-12 | End: 2019-11-12 | Stop reason: SDUPTHER

## 2019-11-12 RX ORDER — HYDROCODONE BITARTRATE AND ACETAMINOPHEN 5; 325 MG/1; MG/1
2 TABLET ORAL PRN
Status: DISCONTINUED | OUTPATIENT
Start: 2019-11-12 | End: 2019-11-12 | Stop reason: HOSPADM

## 2019-11-12 RX ORDER — SODIUM CHLORIDE 0.9 % (FLUSH) 0.9 %
10 SYRINGE (ML) INJECTION PRN
Status: DISCONTINUED | OUTPATIENT
Start: 2019-11-12 | End: 2019-11-12 | Stop reason: HOSPADM

## 2019-11-12 RX ORDER — MORPHINE SULFATE 4 MG/ML
4 INJECTION, SOLUTION INTRAMUSCULAR; INTRAVENOUS
Status: DISCONTINUED | OUTPATIENT
Start: 2019-11-12 | End: 2019-11-13 | Stop reason: HOSPADM

## 2019-11-12 RX ORDER — SODIUM CHLORIDE 0.9 % (FLUSH) 0.9 %
10 SYRINGE (ML) INJECTION EVERY 12 HOURS SCHEDULED
Status: DISCONTINUED | OUTPATIENT
Start: 2019-11-12 | End: 2019-11-13 | Stop reason: HOSPADM

## 2019-11-12 RX ORDER — SODIUM CHLORIDE, SODIUM LACTATE, POTASSIUM CHLORIDE, CALCIUM CHLORIDE 600; 310; 30; 20 MG/100ML; MG/100ML; MG/100ML; MG/100ML
INJECTION, SOLUTION INTRAVENOUS CONTINUOUS
Status: DISCONTINUED | OUTPATIENT
Start: 2019-11-12 | End: 2019-11-12

## 2019-11-12 RX ORDER — GABAPENTIN 100 MG/1
100 CAPSULE ORAL 3 TIMES DAILY
Status: DISCONTINUED | OUTPATIENT
Start: 2019-11-12 | End: 2019-11-13 | Stop reason: HOSPADM

## 2019-11-12 RX ORDER — FENTANYL CITRATE 50 UG/ML
INJECTION, SOLUTION INTRAMUSCULAR; INTRAVENOUS PRN
Status: DISCONTINUED | OUTPATIENT
Start: 2019-11-12 | End: 2019-11-12 | Stop reason: SDUPTHER

## 2019-11-12 RX ORDER — LIDOCAINE HYDROCHLORIDE 10 MG/ML
1 INJECTION, SOLUTION EPIDURAL; INFILTRATION; INTRACAUDAL; PERINEURAL
Status: DISCONTINUED | OUTPATIENT
Start: 2019-11-12 | End: 2019-11-12 | Stop reason: HOSPADM

## 2019-11-12 RX ORDER — SODIUM CHLORIDE 0.9 % (FLUSH) 0.9 %
10 SYRINGE (ML) INJECTION PRN
Status: DISCONTINUED | OUTPATIENT
Start: 2019-11-12 | End: 2019-11-13 | Stop reason: HOSPADM

## 2019-11-12 RX ORDER — OXYCODONE HYDROCHLORIDE AND ACETAMINOPHEN 5; 325 MG/1; MG/1
1 TABLET ORAL EVERY 6 HOURS PRN
Status: DISCONTINUED | OUTPATIENT
Start: 2019-11-12 | End: 2019-11-13 | Stop reason: HOSPADM

## 2019-11-12 RX ORDER — PROPOFOL 10 MG/ML
INJECTION, EMULSION INTRAVENOUS PRN
Status: DISCONTINUED | OUTPATIENT
Start: 2019-11-12 | End: 2019-11-12 | Stop reason: SDUPTHER

## 2019-11-12 RX ORDER — MAGNESIUM HYDROXIDE 1200 MG/15ML
LIQUID ORAL CONTINUOUS PRN
Status: COMPLETED | OUTPATIENT
Start: 2019-11-12 | End: 2019-11-12

## 2019-11-12 RX ORDER — DIPHENHYDRAMINE HYDROCHLORIDE 50 MG/ML
12.5 INJECTION INTRAMUSCULAR; INTRAVENOUS
Status: DISCONTINUED | OUTPATIENT
Start: 2019-11-12 | End: 2019-11-12 | Stop reason: HOSPADM

## 2019-11-12 RX ORDER — ALBUTEROL SULFATE 90 UG/1
2 AEROSOL, METERED RESPIRATORY (INHALATION)
Status: DISCONTINUED | OUTPATIENT
Start: 2019-11-12 | End: 2019-11-13 | Stop reason: HOSPADM

## 2019-11-12 RX ORDER — SODIUM CHLORIDE 0.9 % (FLUSH) 0.9 %
10 SYRINGE (ML) INJECTION EVERY 12 HOURS SCHEDULED
Status: DISCONTINUED | OUTPATIENT
Start: 2019-11-12 | End: 2019-11-12 | Stop reason: ALTCHOICE

## 2019-11-12 RX ADMIN — Medication 2 G: at 13:23

## 2019-11-12 RX ADMIN — HYDROMORPHONE HYDROCHLORIDE 0.5 MG: 1 INJECTION, SOLUTION INTRAMUSCULAR; INTRAVENOUS; SUBCUTANEOUS at 15:04

## 2019-11-12 RX ADMIN — MORPHINE SULFATE 2 MG: 2 INJECTION, SOLUTION INTRAMUSCULAR; INTRAVENOUS at 20:05

## 2019-11-12 RX ADMIN — PHENYLEPHRINE HYDROCHLORIDE 100 MCG: 10 INJECTION INTRAVENOUS at 13:51

## 2019-11-12 RX ADMIN — GABAPENTIN 100 MG: 100 CAPSULE ORAL at 20:04

## 2019-11-12 RX ADMIN — PHENYLEPHRINE HYDROCHLORIDE 200 MCG: 10 INJECTION INTRAVENOUS at 13:28

## 2019-11-12 RX ADMIN — FENTANYL CITRATE 50 MCG: 50 INJECTION, SOLUTION INTRAMUSCULAR; INTRAVENOUS at 15:40

## 2019-11-12 RX ADMIN — LIDOCAINE HYDROCHLORIDE 0.1 ML: 10 INJECTION, SOLUTION EPIDURAL; INFILTRATION; INTRACAUDAL; PERINEURAL at 11:08

## 2019-11-12 RX ADMIN — SODIUM CHLORIDE, POTASSIUM CHLORIDE, SODIUM LACTATE AND CALCIUM CHLORIDE: 600; 310; 30; 20 INJECTION, SOLUTION INTRAVENOUS at 14:12

## 2019-11-12 RX ADMIN — MORPHINE SULFATE 4 MG: 4 INJECTION INTRAVENOUS at 22:15

## 2019-11-12 RX ADMIN — FENTANYL CITRATE 50 MCG: 50 INJECTION, SOLUTION INTRAMUSCULAR; INTRAVENOUS at 15:22

## 2019-11-12 RX ADMIN — FENTANYL CITRATE 100 MCG: 50 INJECTION, SOLUTION INTRAMUSCULAR; INTRAVENOUS at 13:16

## 2019-11-12 RX ADMIN — AMITRIPTYLINE HYDROCHLORIDE 10 MG: 10 TABLET, FILM COATED ORAL at 20:04

## 2019-11-12 RX ADMIN — HYDROMORPHONE HYDROCHLORIDE 0.5 MG: 1 INJECTION, SOLUTION INTRAMUSCULAR; INTRAVENOUS; SUBCUTANEOUS at 14:54

## 2019-11-12 RX ADMIN — SODIUM CHLORIDE, POTASSIUM CHLORIDE, SODIUM LACTATE AND CALCIUM CHLORIDE: 600; 310; 30; 20 INJECTION, SOLUTION INTRAVENOUS at 11:09

## 2019-11-12 RX ADMIN — DOCUSATE SODIUM 100 MG: 100 CAPSULE, LIQUID FILLED ORAL at 20:05

## 2019-11-12 RX ADMIN — PROPOFOL 200 MG: 10 INJECTION, EMULSION INTRAVENOUS at 13:16

## 2019-11-12 RX ADMIN — ONDANSETRON 4 MG: 2 INJECTION INTRAMUSCULAR; INTRAVENOUS at 14:27

## 2019-11-12 RX ADMIN — PHENYLEPHRINE HYDROCHLORIDE 200 MCG: 10 INJECTION INTRAVENOUS at 14:03

## 2019-11-12 RX ADMIN — DEXAMETHASONE SODIUM PHOSPHATE 10 MG: 10 INJECTION INTRAMUSCULAR; INTRAVENOUS at 13:23

## 2019-11-12 RX ADMIN — ROCURONIUM BROMIDE 50 MG: 10 INJECTION INTRAVENOUS at 13:16

## 2019-11-12 RX ADMIN — Medication 10 ML: at 20:05

## 2019-11-12 RX ADMIN — HYDROMORPHONE HYDROCHLORIDE 0.5 MG: 1 INJECTION, SOLUTION INTRAMUSCULAR; INTRAVENOUS; SUBCUTANEOUS at 15:57

## 2019-11-12 RX ADMIN — OXYCODONE HYDROCHLORIDE AND ACETAMINOPHEN 1 TABLET: 5; 325 TABLET ORAL at 18:17

## 2019-11-12 RX ADMIN — CEFAZOLIN SODIUM 2 G: 1 INJECTION, SOLUTION INTRAVENOUS at 22:11

## 2019-11-12 RX ADMIN — FAMOTIDINE 20 MG: 20 TABLET, FILM COATED ORAL at 20:05

## 2019-11-12 RX ADMIN — ROCURONIUM BROMIDE 10 MG: 10 INJECTION INTRAVENOUS at 13:54

## 2019-11-12 RX ADMIN — PHENYLEPHRINE HYDROCHLORIDE 200 MCG: 10 INJECTION INTRAVENOUS at 13:44

## 2019-11-12 RX ADMIN — PHENYLEPHRINE HYDROCHLORIDE 200 MCG: 10 INJECTION INTRAVENOUS at 13:36

## 2019-11-12 RX ADMIN — DEXTROSE AND SODIUM CHLORIDE: 5; 450 INJECTION, SOLUTION INTRAVENOUS at 20:05

## 2019-11-12 RX ADMIN — PHENYLEPHRINE HYDROCHLORIDE 200 MCG: 10 INJECTION INTRAVENOUS at 13:30

## 2019-11-12 RX ADMIN — ROCURONIUM BROMIDE 20 MG: 10 INJECTION INTRAVENOUS at 13:38

## 2019-11-12 RX ADMIN — SUGAMMADEX 200 MG: 100 INJECTION, SOLUTION INTRAVENOUS at 14:27

## 2019-11-12 RX ADMIN — LIDOCAINE HYDROCHLORIDE 80 MG: 20 INJECTION, SOLUTION INTRAVENOUS at 13:16

## 2019-11-12 RX ADMIN — MIDAZOLAM HYDROCHLORIDE 2 MG: 2 INJECTION, SOLUTION INTRAMUSCULAR; INTRAVENOUS at 13:08

## 2019-11-12 RX ADMIN — PHENYLEPHRINE HYDROCHLORIDE 200 MCG: 10 INJECTION INTRAVENOUS at 13:58

## 2019-11-12 ASSESSMENT — PULMONARY FUNCTION TESTS
PIF_VALUE: 3
PIF_VALUE: 21
PIF_VALUE: 0
PIF_VALUE: 2
PIF_VALUE: 22
PIF_VALUE: 21
PIF_VALUE: 22
PIF_VALUE: 2
PIF_VALUE: 22
PIF_VALUE: 0
PIF_VALUE: 2
PIF_VALUE: 23
PIF_VALUE: 22
PIF_VALUE: 27
PIF_VALUE: 21
PIF_VALUE: 0
PIF_VALUE: 3
PIF_VALUE: 21
PIF_VALUE: 21
PIF_VALUE: 22
PIF_VALUE: 24
PIF_VALUE: 22
PIF_VALUE: 15
PIF_VALUE: 22
PIF_VALUE: 21
PIF_VALUE: 22
PIF_VALUE: 22
PIF_VALUE: 21
PIF_VALUE: 3
PIF_VALUE: 22
PIF_VALUE: 22
PIF_VALUE: 18
PIF_VALUE: 1
PIF_VALUE: 21
PIF_VALUE: 1
PIF_VALUE: 22
PIF_VALUE: 1
PIF_VALUE: 22
PIF_VALUE: 24
PIF_VALUE: 22
PIF_VALUE: 2
PIF_VALUE: 22
PIF_VALUE: 3
PIF_VALUE: 23
PIF_VALUE: 5
PIF_VALUE: 22
PIF_VALUE: 23
PIF_VALUE: 23
PIF_VALUE: 21
PIF_VALUE: 23
PIF_VALUE: 23
PIF_VALUE: 22
PIF_VALUE: 22
PIF_VALUE: 21
PIF_VALUE: 22
PIF_VALUE: 2
PIF_VALUE: 21
PIF_VALUE: 21
PIF_VALUE: 22
PIF_VALUE: 23
PIF_VALUE: 21
PIF_VALUE: 22
PIF_VALUE: 1
PIF_VALUE: 22
PIF_VALUE: 22
PIF_VALUE: 3
PIF_VALUE: 23
PIF_VALUE: 22
PIF_VALUE: 24
PIF_VALUE: 24
PIF_VALUE: 22
PIF_VALUE: 18
PIF_VALUE: 17
PIF_VALUE: 22
PIF_VALUE: 25
PIF_VALUE: 22
PIF_VALUE: 22
PIF_VALUE: 23

## 2019-11-12 ASSESSMENT — PAIN - FUNCTIONAL ASSESSMENT: PAIN_FUNCTIONAL_ASSESSMENT: 0-10

## 2019-11-12 ASSESSMENT — PAIN SCALES - GENERAL
PAINLEVEL_OUTOF10: 7
PAINLEVEL_OUTOF10: 6
PAINLEVEL_OUTOF10: 10
PAINLEVEL_OUTOF10: 8
PAINLEVEL_OUTOF10: 7
PAINLEVEL_OUTOF10: 9
PAINLEVEL_OUTOF10: 7
PAINLEVEL_OUTOF10: 10
PAINLEVEL_OUTOF10: 8
PAINLEVEL_OUTOF10: 10
PAINLEVEL_OUTOF10: 9

## 2019-11-13 ENCOUNTER — HOSPITAL ENCOUNTER (EMERGENCY)
Age: 29
Discharge: HOME | End: 2019-11-13
Payer: COMMERCIAL

## 2019-11-13 VITALS
HEART RATE: 106 BPM | TEMPERATURE: 98.1 F | OXYGEN SATURATION: 97 % | HEIGHT: 66 IN | RESPIRATION RATE: 16 BRPM | DIASTOLIC BLOOD PRESSURE: 53 MMHG | BODY MASS INDEX: 30.53 KG/M2 | WEIGHT: 190 LBS | SYSTOLIC BLOOD PRESSURE: 101 MMHG

## 2019-11-13 VITALS
TEMPERATURE: 98.1 F | BODY MASS INDEX: 30.53 KG/M2 | HEART RATE: 100 BPM | OXYGEN SATURATION: 99 % | WEIGHT: 190 LBS | HEIGHT: 66 IN | DIASTOLIC BLOOD PRESSURE: 74 MMHG | SYSTOLIC BLOOD PRESSURE: 122 MMHG | RESPIRATION RATE: 16 BRPM

## 2019-11-13 DIAGNOSIS — R52 PAIN: Primary | ICD-10-CM

## 2019-11-13 PROCEDURE — 6360000002 HC RX W HCPCS: Performed by: NEUROLOGICAL SURGERY

## 2019-11-13 PROCEDURE — 6370000000 HC RX 637 (ALT 250 FOR IP): Performed by: NEUROLOGICAL SURGERY

## 2019-11-13 PROCEDURE — 94761 N-INVAS EAR/PLS OXIMETRY MLT: CPT

## 2019-11-13 PROCEDURE — 94640 AIRWAY INHALATION TREATMENT: CPT

## 2019-11-13 PROCEDURE — 2580000003 HC RX 258: Performed by: NEUROLOGICAL SURGERY

## 2019-11-13 PROCEDURE — 96372 THER/PROPH/DIAG INJ SC/IM: CPT

## 2019-11-13 PROCEDURE — 6360000002 HC RX W HCPCS: Performed by: NURSE PRACTITIONER

## 2019-11-13 PROCEDURE — 99283 EMERGENCY DEPT VISIT LOW MDM: CPT

## 2019-11-13 RX ORDER — CEPHALEXIN 500 MG/1
500 CAPSULE ORAL EVERY 8 HOURS
Qty: 21 CAPSULE | Refills: 0 | Status: SHIPPED | OUTPATIENT
Start: 2019-11-13 | End: 2019-11-20

## 2019-11-13 RX ORDER — OXYCODONE HYDROCHLORIDE AND ACETAMINOPHEN 5; 325 MG/1; MG/1
1 TABLET ORAL EVERY 6 HOURS PRN
Qty: 28 TABLET | Refills: 0 | Status: SHIPPED | OUTPATIENT
Start: 2019-11-13 | End: 2019-11-20

## 2019-11-13 RX ORDER — DIAZEPAM 5 MG/1
5 TABLET ORAL EVERY 6 HOURS PRN
Qty: 28 TABLET | Refills: 0 | Status: SHIPPED | OUTPATIENT
Start: 2019-11-13 | End: 2019-11-20

## 2019-11-13 RX ORDER — PSEUDOEPHEDRINE HCL 30 MG
100 TABLET ORAL 2 TIMES DAILY
Qty: 60 CAPSULE | Refills: 0 | Status: SHIPPED | OUTPATIENT
Start: 2019-11-13 | End: 2019-12-13

## 2019-11-13 RX ADMIN — DEXTROSE AND SODIUM CHLORIDE: 5; 450 INJECTION, SOLUTION INTRAVENOUS at 06:54

## 2019-11-13 RX ADMIN — HYDROMORPHONE HYDROCHLORIDE 1 MG: 1 INJECTION, SOLUTION INTRAMUSCULAR; INTRAVENOUS; SUBCUTANEOUS at 21:41

## 2019-11-13 RX ADMIN — DOCUSATE SODIUM 100 MG: 100 CAPSULE, LIQUID FILLED ORAL at 08:34

## 2019-11-13 RX ADMIN — HYDROMORPHONE HYDROCHLORIDE 1 MG: 1 INJECTION, SOLUTION INTRAMUSCULAR; INTRAVENOUS; SUBCUTANEOUS at 20:05

## 2019-11-13 RX ADMIN — GABAPENTIN 100 MG: 100 CAPSULE ORAL at 08:34

## 2019-11-13 RX ADMIN — DIAZEPAM 5 MG: 5 TABLET ORAL at 00:06

## 2019-11-13 RX ADMIN — ALBUTEROL SULFATE 2 PUFF: 90 AEROSOL, METERED RESPIRATORY (INHALATION) at 07:43

## 2019-11-13 RX ADMIN — FAMOTIDINE 20 MG: 20 TABLET, FILM COATED ORAL at 08:34

## 2019-11-13 RX ADMIN — CEFAZOLIN SODIUM 2 G: 1 INJECTION, SOLUTION INTRAVENOUS at 05:38

## 2019-11-13 RX ADMIN — OXYCODONE HYDROCHLORIDE AND ACETAMINOPHEN 1 TABLET: 5; 325 TABLET ORAL at 08:34

## 2019-11-13 RX ADMIN — OXYCODONE HYDROCHLORIDE AND ACETAMINOPHEN 1 TABLET: 5; 325 TABLET ORAL at 02:19

## 2019-11-13 RX ADMIN — MORPHINE SULFATE 4 MG: 4 INJECTION INTRAVENOUS at 04:10

## 2019-11-13 ASSESSMENT — ENCOUNTER SYMPTOMS
COUGH: 0
EYE PAIN: 0
COLOR CHANGE: 0
DIARRHEA: 0
EYE DISCHARGE: 0
CONSTIPATION: 0
NAUSEA: 0
SORE THROAT: 0
ABDOMINAL PAIN: 0
WHEEZING: 0
TROUBLE SWALLOWING: 0
BACK PAIN: 1
EYE REDNESS: 0
SHORTNESS OF BREATH: 0
VOMITING: 0
RHINORRHEA: 0
BLOOD IN STOOL: 0

## 2019-11-13 ASSESSMENT — PAIN SCALES - GENERAL
PAINLEVEL_OUTOF10: 7
PAINLEVEL_OUTOF10: 10
PAINLEVEL_OUTOF10: 8
PAINLEVEL_OUTOF10: 7
PAINLEVEL_OUTOF10: 6
PAINLEVEL_OUTOF10: 6
PAINLEVEL_OUTOF10: 9
PAINLEVEL_OUTOF10: 10
PAINLEVEL_OUTOF10: 6
PAINLEVEL_OUTOF10: 7

## 2019-11-13 ASSESSMENT — PAIN DESCRIPTION - PAIN TYPE: TYPE: ACUTE PAIN

## 2019-11-13 ASSESSMENT — PAIN DESCRIPTION - ORIENTATION: ORIENTATION: POSTERIOR

## 2019-11-13 ASSESSMENT — PAIN DESCRIPTION - LOCATION
LOCATION: BACK
LOCATION: NECK

## 2019-11-20 ENCOUNTER — HOSPITAL ENCOUNTER (OUTPATIENT)
Dept: GENERAL RADIOLOGY | Age: 29
Discharge: HOME | End: 2019-11-22
Payer: COMMERCIAL

## 2019-11-20 ENCOUNTER — HOSPITAL ENCOUNTER (OUTPATIENT)
Dept: ULTRASOUND IMAGING | Age: 29
Discharge: HOME | End: 2019-11-22
Payer: COMMERCIAL

## 2019-11-20 ENCOUNTER — OFFICE VISIT (OUTPATIENT)
Dept: FAMILY MEDICINE CLINIC | Age: 29
End: 2019-11-20
Payer: COMMERCIAL

## 2019-11-20 ENCOUNTER — TELEPHONE (OUTPATIENT)
Dept: FAMILY MEDICINE CLINIC | Age: 29
End: 2019-11-20

## 2019-11-20 VITALS
DIASTOLIC BLOOD PRESSURE: 76 MMHG | WEIGHT: 190 LBS | TEMPERATURE: 98.5 F | OXYGEN SATURATION: 98 % | BODY MASS INDEX: 30.53 KG/M2 | RESPIRATION RATE: 15 BRPM | HEIGHT: 66 IN | HEART RATE: 98 BPM | SYSTOLIC BLOOD PRESSURE: 134 MMHG

## 2019-11-20 DIAGNOSIS — M79.602 LEFT ARM PAIN: ICD-10-CM

## 2019-11-20 DIAGNOSIS — G89.18 POST-OPERATIVE PAIN: ICD-10-CM

## 2019-11-20 DIAGNOSIS — G89.18 POST-OPERATIVE PAIN: Primary | ICD-10-CM

## 2019-11-20 DIAGNOSIS — M79.661 RIGHT CALF PAIN: ICD-10-CM

## 2019-11-20 PROCEDURE — G8417 CALC BMI ABV UP PARAM F/U: HCPCS | Performed by: INTERNAL MEDICINE

## 2019-11-20 PROCEDURE — 73060 X-RAY EXAM OF HUMERUS: CPT

## 2019-11-20 PROCEDURE — 99214 OFFICE O/P EST MOD 30 MIN: CPT | Performed by: INTERNAL MEDICINE

## 2019-11-20 PROCEDURE — 93971 EXTREMITY STUDY: CPT

## 2019-11-20 PROCEDURE — G8484 FLU IMMUNIZE NO ADMIN: HCPCS | Performed by: INTERNAL MEDICINE

## 2019-11-20 PROCEDURE — 73030 X-RAY EXAM OF SHOULDER: CPT

## 2019-11-20 PROCEDURE — 1036F TOBACCO NON-USER: CPT | Performed by: INTERNAL MEDICINE

## 2019-11-20 PROCEDURE — G8427 DOCREV CUR MEDS BY ELIG CLIN: HCPCS | Performed by: INTERNAL MEDICINE

## 2019-11-20 PROCEDURE — 72050 X-RAY EXAM NECK SPINE 4/5VWS: CPT

## 2019-11-20 RX ORDER — GABAPENTIN 100 MG/1
100 CAPSULE ORAL 3 TIMES DAILY PRN
Qty: 42 CAPSULE | Refills: 0 | Status: SHIPPED | OUTPATIENT
Start: 2019-11-20 | End: 2019-12-27

## 2019-11-20 ASSESSMENT — ENCOUNTER SYMPTOMS
SHORTNESS OF BREATH: 0
BACK PAIN: 0
EYE PAIN: 0
ABDOMINAL PAIN: 0

## 2019-11-25 RX ORDER — CYCLOBENZAPRINE HCL 10 MG
10 TABLET ORAL 3 TIMES DAILY
Qty: 40 TABLET | Refills: 0 | Status: SHIPPED | OUTPATIENT
Start: 2019-11-25 | End: 2019-12-04 | Stop reason: ALTCHOICE

## 2019-12-04 ENCOUNTER — OFFICE VISIT (OUTPATIENT)
Dept: FAMILY MEDICINE CLINIC | Age: 29
End: 2019-12-04
Payer: COMMERCIAL

## 2019-12-04 VITALS
SYSTOLIC BLOOD PRESSURE: 122 MMHG | HEART RATE: 96 BPM | TEMPERATURE: 98.2 F | WEIGHT: 190 LBS | RESPIRATION RATE: 15 BRPM | HEIGHT: 66 IN | BODY MASS INDEX: 30.53 KG/M2 | OXYGEN SATURATION: 98 % | DIASTOLIC BLOOD PRESSURE: 86 MMHG

## 2019-12-04 DIAGNOSIS — M54.12 CERVICAL RADICULOPATHY: Primary | ICD-10-CM

## 2019-12-04 DIAGNOSIS — F41.9 ANXIETY: ICD-10-CM

## 2019-12-04 DIAGNOSIS — M79.7 FIBROMYALGIA: ICD-10-CM

## 2019-12-04 DIAGNOSIS — R03.0 ELEVATED BLOOD-PRESSURE READING WITHOUT DIAGNOSIS OF HYPERTENSION: ICD-10-CM

## 2019-12-04 DIAGNOSIS — F32.A DEPRESSION, UNSPECIFIED DEPRESSION TYPE: ICD-10-CM

## 2019-12-04 PROCEDURE — G8427 DOCREV CUR MEDS BY ELIG CLIN: HCPCS | Performed by: INTERNAL MEDICINE

## 2019-12-04 PROCEDURE — 99214 OFFICE O/P EST MOD 30 MIN: CPT | Performed by: INTERNAL MEDICINE

## 2019-12-04 PROCEDURE — G8484 FLU IMMUNIZE NO ADMIN: HCPCS | Performed by: INTERNAL MEDICINE

## 2019-12-04 PROCEDURE — G8417 CALC BMI ABV UP PARAM F/U: HCPCS | Performed by: INTERNAL MEDICINE

## 2019-12-04 PROCEDURE — 1036F TOBACCO NON-USER: CPT | Performed by: INTERNAL MEDICINE

## 2019-12-04 ASSESSMENT — ENCOUNTER SYMPTOMS
SHORTNESS OF BREATH: 0
BACK PAIN: 0
EYE PAIN: 0
ABDOMINAL PAIN: 0

## 2019-12-27 ENCOUNTER — OFFICE VISIT (OUTPATIENT)
Dept: FAMILY MEDICINE CLINIC | Age: 29
End: 2019-12-27
Payer: COMMERCIAL

## 2019-12-27 VITALS
TEMPERATURE: 98.6 F | RESPIRATION RATE: 15 BRPM | OXYGEN SATURATION: 98 % | DIASTOLIC BLOOD PRESSURE: 74 MMHG | BODY MASS INDEX: 28.93 KG/M2 | SYSTOLIC BLOOD PRESSURE: 134 MMHG | WEIGHT: 180 LBS | HEIGHT: 66 IN | HEART RATE: 90 BPM

## 2019-12-27 DIAGNOSIS — E55.9 VITAMIN D DEFICIENCY: ICD-10-CM

## 2019-12-27 DIAGNOSIS — F32.A DEPRESSION, UNSPECIFIED DEPRESSION TYPE: Primary | ICD-10-CM

## 2019-12-27 PROCEDURE — 99213 OFFICE O/P EST LOW 20 MIN: CPT | Performed by: INTERNAL MEDICINE

## 2019-12-27 PROCEDURE — G8484 FLU IMMUNIZE NO ADMIN: HCPCS | Performed by: INTERNAL MEDICINE

## 2019-12-27 PROCEDURE — G8427 DOCREV CUR MEDS BY ELIG CLIN: HCPCS | Performed by: INTERNAL MEDICINE

## 2019-12-27 PROCEDURE — 1036F TOBACCO NON-USER: CPT | Performed by: INTERNAL MEDICINE

## 2019-12-27 PROCEDURE — G8417 CALC BMI ABV UP PARAM F/U: HCPCS | Performed by: INTERNAL MEDICINE

## 2019-12-27 ASSESSMENT — ENCOUNTER SYMPTOMS
EYE PAIN: 0
BACK PAIN: 0
ABDOMINAL PAIN: 0
SHORTNESS OF BREATH: 0

## 2019-12-30 ENCOUNTER — TELEPHONE (OUTPATIENT)
Dept: FAMILY MEDICINE CLINIC | Age: 29
End: 2019-12-30

## 2020-01-06 ENCOUNTER — OFFICE VISIT (OUTPATIENT)
Dept: FAMILY MEDICINE CLINIC | Age: 30
End: 2020-01-06
Payer: COMMERCIAL

## 2020-01-06 VITALS
SYSTOLIC BLOOD PRESSURE: 122 MMHG | HEIGHT: 66 IN | HEART RATE: 117 BPM | TEMPERATURE: 97.5 F | WEIGHT: 179.2 LBS | BODY MASS INDEX: 28.8 KG/M2 | DIASTOLIC BLOOD PRESSURE: 70 MMHG | OXYGEN SATURATION: 98 %

## 2020-01-06 PROCEDURE — G8417 CALC BMI ABV UP PARAM F/U: HCPCS | Performed by: NURSE PRACTITIONER

## 2020-01-06 PROCEDURE — 1036F TOBACCO NON-USER: CPT | Performed by: NURSE PRACTITIONER

## 2020-01-06 PROCEDURE — 99213 OFFICE O/P EST LOW 20 MIN: CPT | Performed by: NURSE PRACTITIONER

## 2020-01-06 PROCEDURE — G8427 DOCREV CUR MEDS BY ELIG CLIN: HCPCS | Performed by: NURSE PRACTITIONER

## 2020-01-06 PROCEDURE — G8484 FLU IMMUNIZE NO ADMIN: HCPCS | Performed by: NURSE PRACTITIONER

## 2020-01-06 RX ORDER — VILAZODONE HYDROCHLORIDE 20 MG/1
20 TABLET ORAL DAILY
Qty: 30 TABLET | Refills: 3 | Status: SHIPPED | OUTPATIENT
Start: 2020-01-06 | End: 2020-04-03

## 2020-01-06 ASSESSMENT — ENCOUNTER SYMPTOMS
SHORTNESS OF BREATH: 0
WHEEZING: 0
COUGH: 0

## 2020-01-06 NOTE — PATIENT INSTRUCTIONS
Patient Education        Recovering From Depression: Care Instructions  Your Care Instructions    Taking good care of yourself is important as you recover from depression. In time, your symptoms will fade as your treatment takes hold. Do not give up. Instead, focus your energy on getting better. Your mood will improve. It just takes some time. Focus on things that can help you feel better, such as being with friends and family, eating well, and getting enough rest. But take things slowly. Do not do too much too soon. You will begin to feel better gradually. Follow-up care is a key part of your treatment and safety. Be sure to make and go to all appointments, and call your doctor if you are having problems. It's also a good idea to know your test results and keep a list of the medicines you take. How can you care for yourself at home? Be realistic  · If you have a large task to do, break it up into smaller steps you can handle, and just do what you can. · You may want to put off important decisions until your depression has lifted. If you have plans that will have a major impact on your life, such as marriage, divorce, or a job change, try to wait a bit. Talk it over with friends and loved ones who can help you look at the overall picture first.  · Reaching out to people for help is important. Do not isolate yourself. Let your family and friends help you. Find someone you can trust and confide in, and talk to that person. · Be patient, and be kind to yourself. Remember that depression is not your fault and is not something you can overcome with willpower alone. Treatment is necessary for depression, just like for any other illness. Feeling better takes time, and your mood will improve little by little. Stay active  · Stay busy and get outside. Take a walk, or try some other light exercise. · Talk with your doctor about an exercise program. Exercise can help with mild depression. · Go to a movie or concert. Take part in a Yarsanism activity or other social gathering. Go to a [employer] game. · Ask a friend to have dinner with you. Take care of yourself  · Eat a balanced diet with plenty of fresh fruits and vegetables, whole grains, and lean protein. If you have lost your appetite, eat small snacks rather than large meals. · Avoid drinking alcohol or using illegal drugs. Do not take medicines that have not been prescribed for you. They may interfere with medicines you may be taking for depression, or they may make your depression worse. · Take your medicines exactly as they are prescribed. You may start to feel better within 1 to 3 weeks of taking antidepressant medicine. But it can take as many as 6 to 8 weeks to see more improvement. If you have questions or concerns about your medicines, or if you do not notice any improvement by 3 weeks, talk to your doctor. · If you have any side effects from your medicine, tell your doctor. Antidepressants can make you feel tired, dizzy, or nervous. Some people have dry mouth, constipation, headaches, sexual problems, or diarrhea. Many of these side effects are mild and will go away on their own after you have been taking the medicine for a few weeks. Some may last longer. Talk to your doctor if side effects are bothering you too much. You might be able to try a different medicine. · Get enough sleep. If you have problems sleeping:  ? Go to bed at the same time every night, and get up at the same time every morning. ? Keep your bedroom dark and quiet. ? Do not exercise after 5:00 p.m.  ? Avoid drinks with caffeine after 5:00 p.m. · Avoid sleeping pills unless they are prescribed by the doctor treating your depression. Sleeping pills may make you groggy during the day, and they may interact with other medicine you are taking. · If you have any other illnesses, such as diabetes, heart disease, or high blood pressure, make sure to continue with your treatment.  Tell your doctor about all of the medicines you take, including those with or without a prescription. · Keep the numbers for these national suicide hotlines: 9-290-516-TALK (3-199.199.3215) and 8-472-UMKDBFQ (6-675.437.1730). If you or someone you know talks about suicide or feeling hopeless, get help right away. When should you call for help? Call 911 anytime you think you may need emergency care. For example, call if:    · You feel like hurting yourself or someone else.     · Someone you know has depression and is about to attempt or is attempting suicide.   [facility] your doctor now or seek immediate medical care if:    · You hear voices.     · Someone you know has depression and:  ? Starts to give away his or her possessions. ? Uses illegal drugs or drinks alcohol heavily. ? Talks or writes about death, including writing suicide notes or talking about guns, knives, or pills. ? Starts to spend a lot of time alone. ? Acts very aggressively or suddenly appears calm.    Watch closely for changes in your health, and be sure to contact your doctor if:    · You do not get better as expected. Where can you learn more? Go to [email or web address]://[email or web address].[email or web address] org and sign in to your [employer] account. Enter Z460 in the [employer] box to learn more about \"Recovering From Depression: Care Instructions. \"     If you do not have an account, please click on the \"Sign Up Now\" link. Current as of: September 11, 2018  Content Version: 12.1  © 2692-7814 Healthwise, Incorporated. Care instructions adapted under license by [facility] ([facility]). If you have questions about a medical condition or this instruction, always ask your healthcare professional. [place] [address] any warranty or liability for your use of this information.

## 2020-01-06 NOTE — PROGRESS NOTES
status: Single     Spouse name: Not on file    Number of children: Not on file    Years of education: Not on file    Highest education level: Not on file   Occupational History    Not on file   Social Needs    Financial resource strain: Not on file    Food insecurity:     Worry: Not on file     Inability: Not on file    Transportation needs:     Medical: Not on file     Non-medical: Not on file   Tobacco Use    Smoking status: Never Smoker    Smokeless tobacco: Never Used   Substance and Sexual Activity    Alcohol use: Yes     Alcohol/week: 0.0 standard drinks     Comment: rare social    Drug use: Yes     Types: Marijuana     Comment: vape daily    Sexual activity: Yes     Partners: Male   Lifestyle    Physical activity:     Days per week: Not on file     Minutes per session: Not on file    Stress: Not on file   Relationships    Social connections:     Talks on phone: Not on file     Gets together: Not on file     Attends Ep[employer] service: Not on file     Active member of club or organization: Not on file     Attends meetings of clubs or organizations: Not on file     Relationship status: Not on file    Intimate partner violence:     Fear of current or ex partner: Not on file     Emotionally abused: Not on file     Physically abused: Not on file     Forced sexual activity: Not on file   Other Topics Concern    Not on file   Social History Narrative    Not on file     Current Outpatient Medications on File Prior to Visit   Medication Sig Dispense Refill    amitriptyline (ELAVIL) 10 MG tablet Take 10 mg by mouth nightly      VENTOLIN  (90 Base) MCG/ACT inhaler inhale 2 puffs by mouth four times a day if needed  0    gabapentin (NEURONTIN) 100 MG capsule Take 1 capsule by mouth 3 times daily for 30 days. 90 capsule 0     No current facility-administered medications on file prior to visit. Allergies:  Effexor [venlafaxine]; Geodon [ziprasidone hcl];  Metronidazole; Seasonal; Wellbutrin

## 2020-02-03 ENCOUNTER — OFFICE VISIT (OUTPATIENT)
Dept: FAMILY MEDICINE CLINIC | Age: 30
End: 2020-02-03
Payer: COMMERCIAL

## 2020-02-03 VITALS
HEART RATE: 92 BPM | BODY MASS INDEX: 28.77 KG/M2 | OXYGEN SATURATION: 98 % | DIASTOLIC BLOOD PRESSURE: 72 MMHG | WEIGHT: 179 LBS | HEIGHT: 66 IN | SYSTOLIC BLOOD PRESSURE: 118 MMHG | TEMPERATURE: 98.1 F | RESPIRATION RATE: 15 BRPM

## 2020-02-03 DIAGNOSIS — E55.9 VITAMIN D INSUFFICIENCY: ICD-10-CM

## 2020-02-03 DIAGNOSIS — F32.A DEPRESSION, UNSPECIFIED DEPRESSION TYPE: ICD-10-CM

## 2020-02-03 DIAGNOSIS — M79.7 FIBROMYALGIA: ICD-10-CM

## 2020-02-03 DIAGNOSIS — R53.83 OTHER FATIGUE: ICD-10-CM

## 2020-02-03 DIAGNOSIS — F41.9 ANXIETY: ICD-10-CM

## 2020-02-03 LAB
ALBUMIN SERPL-MCNC: 4.3 G/DL (ref 3.5–4.6)
ALP BLD-CCNC: 78 U/L (ref 40–130)
ALT SERPL-CCNC: 26 U/L (ref 0–33)
ANION GAP SERPL CALCULATED.3IONS-SCNC: 13 MEQ/L (ref 9–15)
AST SERPL-CCNC: 15 U/L (ref 0–35)
BASOPHILS ABSOLUTE: 0.1 K/UL (ref 0–0.2)
BASOPHILS RELATIVE PERCENT: 0.7 %
BILIRUB SERPL-MCNC: <0.2 MG/DL (ref 0.2–0.7)
BUN BLDV-MCNC: 17 MG/DL (ref 6–20)
CALCIUM SERPL-MCNC: 9.6 MG/DL (ref 8.5–9.9)
CHLORIDE BLD-SCNC: 106 MEQ/L (ref 95–107)
CHOLESTEROL, TOTAL: 181 MG/DL (ref 0–199)
CO2: 25 MEQ/L (ref 20–31)
CREAT SERPL-MCNC: 0.69 MG/DL (ref 0.5–0.9)
EOSINOPHILS ABSOLUTE: 0.2 K/UL (ref 0–0.7)
EOSINOPHILS RELATIVE PERCENT: 2 %
GFR AFRICAN AMERICAN: >60
GFR NON-AFRICAN AMERICAN: >60
GLOBULIN: 3.1 G/DL (ref 2.3–3.5)
GLUCOSE BLD-MCNC: 92 MG/DL (ref 70–99)
HCT VFR BLD CALC: 40.9 % (ref 37–47)
HDLC SERPL-MCNC: 45 MG/DL (ref 40–59)
HEMOGLOBIN: 13.6 G/DL (ref 12–16)
LDL CHOLESTEROL CALCULATED: 110 MG/DL (ref 0–129)
LYMPHOCYTES ABSOLUTE: 2.5 K/UL (ref 1–4.8)
LYMPHOCYTES RELATIVE PERCENT: 28.4 %
MCH RBC QN AUTO: 30.5 PG (ref 27–31.3)
MCHC RBC AUTO-ENTMCNC: 33.3 % (ref 33–37)
MCV RBC AUTO: 91.5 FL (ref 82–100)
MONOCYTES ABSOLUTE: 0.3 K/UL (ref 0.2–0.8)
MONOCYTES RELATIVE PERCENT: 3.9 %
NEUTROPHILS ABSOLUTE: 5.8 K/UL (ref 1.4–6.5)
NEUTROPHILS RELATIVE PERCENT: 65 %
PDW BLD-RTO: 13.3 % (ref 11.5–14.5)
PLATELET # BLD: 273 K/UL (ref 130–400)
POTASSIUM SERPL-SCNC: 4.6 MEQ/L (ref 3.4–4.9)
RBC # BLD: 4.46 M/UL (ref 4.2–5.4)
SODIUM BLD-SCNC: 144 MEQ/L (ref 135–144)
TOTAL PROTEIN: 7.4 G/DL (ref 6.3–8)
TRIGL SERPL-MCNC: 131 MG/DL (ref 0–150)
TSH REFLEX: 0.71 UIU/ML (ref 0.44–3.86)
VITAMIN D 25-HYDROXY: 21.5 NG/ML (ref 30–100)
WBC # BLD: 9 K/UL (ref 4.8–10.8)

## 2020-02-03 PROCEDURE — 99214 OFFICE O/P EST MOD 30 MIN: CPT | Performed by: INTERNAL MEDICINE

## 2020-02-03 PROCEDURE — G8427 DOCREV CUR MEDS BY ELIG CLIN: HCPCS | Performed by: INTERNAL MEDICINE

## 2020-02-03 PROCEDURE — G8484 FLU IMMUNIZE NO ADMIN: HCPCS | Performed by: INTERNAL MEDICINE

## 2020-02-03 PROCEDURE — 1036F TOBACCO NON-USER: CPT | Performed by: INTERNAL MEDICINE

## 2020-02-03 PROCEDURE — G8417 CALC BMI ABV UP PARAM F/U: HCPCS | Performed by: INTERNAL MEDICINE

## 2020-02-03 ASSESSMENT — ENCOUNTER SYMPTOMS
BACK PAIN: 0
ABDOMINAL PAIN: 0
SHORTNESS OF BREATH: 0
EYE PAIN: 0

## 2020-02-03 NOTE — PROGRESS NOTES
Subjective:      Patient ID: Meron Fonseca is a 26 y.o. female who presents today with:  Chief Complaint   Patient presents with    Follow-up    Discuss Medications       HPI    Depression-Chronic, has good days and bad days. On viibrid, was on trintellex and had nausea an headaches with it. No si/hi. Known fibromyalgia partially improved with amitriptyline. Compliant. Past Medical History:   Diagnosis Date    Arthritis     spine & fingers    Asthma     since childhood    Chronic back pain     Chronic headaches     Depression     Depression     PONV (postoperative nausea and vomiting)     Postpartum depression 2017    Seizures ([address]      Past Surgical History:   Procedure Laterality Date    APPENDECTOMY  2016    CERVICAL LAMINECTOMY Left 2019    POSTERIOR FORAMINOTOMIES C 5-6, C 6-7 LEFT performed by Alissa Gomez MD at [address] [place] [address] N/A 2017     SECTION performed [facility]i [facility] at [facility] L[facility]D [facility]    LAPAROSCOPIC APPENDECTOMY  2016    Dr S[facility] History     Socioeconomic History    Marital status: Single     Spouse name: Not on file    Number of children: Not on file    Years of education: Not on file    Highest education level: Not on file   Occupational History    Not on file   Social Needs    Financial resource strain: Not on file    Food insecurity:     Worry: Not on file     Inability: Not on file    Transportation needs:     Medical: Not on file     Non-medical: Not on file   Tobacco Use    Smoking status: Never Smoker    Smokeless tobacco: Never Used   Substance and Sexual Activity    Alcohol use:  Yes     Alcohol/week: 0.0 standard drinks     Comment: rare social    Drug use: Yes     Types: Marijuana     Comment: vape daily    Sexual activity: Yes     Partners: Male   Lifestyle    Physical activity:     Days per week: Not on file     Minutes per session: Not on file    Stress: Not on file Relationships    Social connections:     Talks on phone: Not on file     Gets together: Not on file     Attends Holiness service: Not on file     Active member of club or organization: Not on file     Attends meetings of clubs or organizations: Not on file     Relationship status: Not on file    Intimate partner violence:     Fear of current or ex partner: Not on file     Emotionally abused: Not on file     Physically abused: Not on file     Forced sexual activity: Not on file   Other Topics Concern    Not on file   Social History Narrative    Not on file     Allergies   Allergen Reactions    Effexor [Venlafaxine]      MADE HER SUICIDAL    Geodon [Ziprasidone Hcl] Other (See Comments)     tremors    Metronidazole Diarrhea and Nausea Only    Seasonal Other (See Comments)     Sinus sx    Trintellix [Vortioxetine]     Wellbutrin [Bupropion]      SEIZURE    Onion Rash     stomach ache     Current Outpatient Medications on File Prior to Visit   Medication Sig Dispense Refill    vilazodone HCl (VILAZODONE HCL) 20 MG TABS Take 1 tablet by mouth daily 30 tablet 3    amitriptyline (ELAVIL) 10 MG tablet Take 10 mg by mouth nightly      VENTOLIN  (90 Base) MCG/ACT inhaler inhale 2 puffs by mouth four times a day if needed  0     No current facility-administered medications on file prior to visit. I have personally reviewed the ROS, PMH, PFH, and social history     Review of Systems   Constitutional: Negative for chills and fever. HENT: Negative for congestion. Eyes: Negative for pain. Respiratory: Negative for shortness of breath. Cardiovascular: Negative for chest pain. Gastrointestinal: Negative for abdominal pain. Genitourinary: Negative for hematuria. Musculoskeletal: Negative for back pain. Allergic/Immunologic: Negative for immunocompromised state. Neurological: Negative for headaches. Psychiatric/Behavioral: Negative for hallucinations and suicidal ideas.  The patient is nervous/anxious. Objective:   /72 (Site: Left Upper Arm, Position: Sitting, Cuff Size: Large Adult)   Pulse 92   Temp 98.1 °F (36.7 °C) (Tympanic)   Resp 15   Ht 5' 6\" (1.676 m)   Wt 179 lb (81.2 kg)   SpO2 98%   BMI 28.89 kg/m²     Physical Exam  Constitutional:       Appearance: She is well-developed. HENT:      Head: Normocephalic. Eyes:      Pupils: Pupils are equal, round, and reactive to light. Neck:      Vascular: No carotid bruit. Trachea: No tracheal deviation. Cardiovascular:      Rate and Rhythm: Normal rate and regular rhythm. Pulses: Normal pulses. Heart sounds: Normal heart sounds. No murmur. No friction rub. No gallop. Pulmonary:      Effort: Pulmonary effort is normal. No respiratory distress. Breath sounds: No wheezing or rhonchi. Abdominal:      General: Abdomen is flat. Bowel sounds are normal. There is no distension. Palpations: Abdomen is soft. Tenderness: There is no abdominal tenderness. There is no guarding or rebound. Musculoskeletal:      Left lower leg: No edema. Comments: No fluctuance erythema or warmth or cervical spine  surgical incision intact   Skin:     General: Skin is warm and dry. Neurological:      Mental Status: She is oriented to person, place, and time. Motor: No weakness. Coordination: Coordination normal.      Deep Tendon Reflexes: Reflexes normal.           Assessment:       Diagnosis Orders   1. Irena Hooper MD, [facility]   2. Depression, unspecified depression type  [facility], Joanne Posada MD, [facility]   3. May Cabot MD, [address]:    [facility] she failed, on viibrid with possible success. Stay on viibrid until you see Dr. Geoff Carr.    No si/hi   She will increase amitriptyline soon (this was done by Dr. Kolby Montano at 25 mg daily, but still about the same in terms of symptoms)  Patient was offered pregnancy test, she

## 2020-02-14 RX ORDER — NORGESTIMATE AND ETHINYL ESTRADIOL 0.25-0.035
KIT ORAL
Qty: 1 PACKET | Refills: 14 | Status: SHIPPED | OUTPATIENT
Start: 2020-02-14 | End: 2020-10-27

## 2020-02-24 ENCOUNTER — OFFICE VISIT (OUTPATIENT)
Dept: FAMILY MEDICINE CLINIC | Age: 30
End: 2020-02-24
Payer: COMMERCIAL

## 2020-02-24 VITALS
WEIGHT: 184 LBS | OXYGEN SATURATION: 99 % | BODY MASS INDEX: 29.57 KG/M2 | TEMPERATURE: 98.2 F | HEART RATE: 101 BPM | DIASTOLIC BLOOD PRESSURE: 88 MMHG | RESPIRATION RATE: 14 BRPM | SYSTOLIC BLOOD PRESSURE: 132 MMHG | HEIGHT: 66 IN

## 2020-02-24 DIAGNOSIS — R35.0 FREQUENT URINATION: ICD-10-CM

## 2020-02-24 LAB
BILIRUBIN, POC: NEGATIVE
BLOOD URINE, POC: NEGATIVE
CLARITY, POC: CLEAR
COLOR, POC: YELLOW
CONTROL: NEGATIVE
GLUCOSE URINE, POC: NEGATIVE
KETONES, POC: NEGATIVE
LEUKOCYTE EST, POC: NEGATIVE
NITRITE, POC: NEGATIVE
PH, POC: 6.5
PREGNANCY TEST URINE, POC: NEGATIVE
PROTEIN, POC: NEGATIVE
SPECIFIC GRAVITY, POC: 1.01
UROBILINOGEN, POC: NORMAL

## 2020-02-24 PROCEDURE — G8484 FLU IMMUNIZE NO ADMIN: HCPCS | Performed by: NURSE PRACTITIONER

## 2020-02-24 PROCEDURE — G8427 DOCREV CUR MEDS BY ELIG CLIN: HCPCS | Performed by: NURSE PRACTITIONER

## 2020-02-24 PROCEDURE — 81003 URINALYSIS AUTO W/O SCOPE: CPT | Performed by: NURSE PRACTITIONER

## 2020-02-24 PROCEDURE — 1036F TOBACCO NON-USER: CPT | Performed by: NURSE PRACTITIONER

## 2020-02-24 PROCEDURE — 99213 OFFICE O/P EST LOW 20 MIN: CPT | Performed by: NURSE PRACTITIONER

## 2020-02-24 PROCEDURE — 81025 URINE PREGNANCY TEST: CPT | Performed by: NURSE PRACTITIONER

## 2020-02-24 PROCEDURE — G8417 CALC BMI ABV UP PARAM F/U: HCPCS | Performed by: NURSE PRACTITIONER

## 2020-02-24 RX ORDER — FLUCONAZOLE 200 MG/1
200 TABLET ORAL DAILY
Qty: 2 TABLET | Refills: 0 | Status: SHIPPED | OUTPATIENT
Start: 2020-02-24 | End: 2020-04-07 | Stop reason: CLARIF

## 2020-02-24 RX ORDER — AZITHROMYCIN 500 MG/1
1000 TABLET, FILM COATED ORAL ONCE
Qty: 2 TABLET | Refills: 0 | Status: SHIPPED | OUTPATIENT
Start: 2020-02-24 | End: 2020-02-24

## 2020-02-24 ASSESSMENT — ENCOUNTER SYMPTOMS
SORE THROAT: 0
CONSTIPATION: 0
COLOR CHANGE: 0
VOMITING: 0
ABDOMINAL PAIN: 1
VOICE CHANGE: 0
TROUBLE SWALLOWING: 0
COUGH: 0
BACK PAIN: 1
DIARRHEA: 0
NAUSEA: 0
SHORTNESS OF BREATH: 0

## 2020-02-24 NOTE — PROGRESS NOTES
Pulse: 101   Resp: 14   Temp: 98.2 °F (36.8 °C)   TempSrc: Oral   SpO2: 99%   Weight: 184 lb (83.5 kg)   Height: 5' 6\" (1.676 m)     BP Readings from Last 3 Encounters:   02/24/20 132/88   02/03/20 118/72   01/06/20 122/70     Wt Readings from Last 3 Encounters:   02/24/20 184 lb (83.5 kg)   02/03/20 179 lb (81.2 kg)   01/06/20 179 lb 3.2 oz (81.3 kg)     Physical Exam  Vitals signs and nursing note reviewed. Exam conducted with a chaperone present. Constitutional:       General: She is not in acute distress. Appearance: Normal appearance. She is well-developed. HENT:      Head: Normocephalic and atraumatic. Nose: Nose normal.      Mouth/Throat:      Mouth: Mucous membranes are moist.   Eyes:      General:         Right eye: No discharge. Left eye: No discharge. Conjunctiva/sclera: Conjunctivae normal.   Neck:      Musculoskeletal: Normal range of motion and neck supple. No neck rigidity or muscular tenderness. Vascular: No JVD. Trachea: No tracheal deviation. Cardiovascular:      Rate and Rhythm: Normal rate. Pulmonary:      Effort: Pulmonary effort is normal. No respiratory distress. Breath sounds: Normal breath sounds. No stridor. No wheezing, rhonchi or rales. Chest:      Chest wall: No tenderness. Abdominal:      General: Bowel sounds are normal. There is no distension. Palpations: Abdomen is soft. There is no mass. Tenderness: There is no abdominal tenderness. There is no right CVA tenderness, left CVA tenderness, guarding or rebound. Hernia: No hernia is present. There is no hernia in the right inguinal area or left inguinal area. Genitourinary:     General: Normal vulva. Exam position: Supine. Labia:         Right: No rash, tenderness, lesion or injury. Left: No rash, tenderness, lesion or injury. Urethra: No prolapse, urethral pain, urethral swelling or urethral lesion. Vagina: Vaginal discharge (white) present. (ZITHROMAX) 500 MG tablet     Sig: Take 2 tablets by mouth once for 1 dose     Dispense:  2 tablet     Refill:  0     There are no discontinued medications. Return if symptoms worsen or fail to improve. PROCEDURES:  Unless otherwise noted below, none     Procedures    Patient does not think that she has been exposed to any STI's however is sexually active with a partner who lives out of state. She would like to be screened for STI's. Pelvic exam was performed with chaprone and cultures and wet prep obtained to rule out gonorrhea, chlamydia, yeast and trichomonas. Patient advised to follow up with GYN for amenorrhea, pregnancy test was negative. She did mention that she just started a new birthcontrol pill last week. Patient presents to the [address] [place] [address] with the above noted complaint. Patient is non-toxic and vital signs are normal.     POCT UA obtained during visit showing  negative. Urine culture sent and is pending at this time. Will notify patient of results once they are resulted. Patient will be discharged home in stable condition. Side effects and adverse effects of any medication prescribed today, as well as treatment plan/rationale, follow-up care, and result expectations have been discussed with the patient. Discussed signs and symptoms which require immediate follow-up in ED/call to 911. Understanding verbalized. Close follow up to evaluate treatment results and for coordination of care. I have reviewed the patient's medical history in detail and updated the computerized patient record.       Jose Puckett, APRN - CNP

## 2020-02-24 NOTE — PATIENT INSTRUCTIONS
\"Sign Up Now\" link. Current as of: August 21, 2019  Content Version: 12.3  © 0738-5557 [employer]. Care instructions adapted under license by [facility] ([facility]). If you have questions about a medical condition or this instruction, always ask your healthcare professional. [address] any warranty or liability for your use of this information. Antibiotics: To prevent antibiotic resistances please take medication as ordered and for the full duration even if you start to feel better. If you are using contraception please use a backup method of contraception such as condoms during antibiotic use and for 7 days after completing treatment to prevent pregnancy. Females: Consider intake of yogurt or probiotic during antibiotic use and for a few days after to help reduce the risk of superinfection (yeast infection). Separate the yogurt and antibiotic by at least 1 hour. Avoid Alcohol. Patient Education        Secondary Amenorrhea: Care Instructions  Your Care Instructions    Amenorrhea means you do not have menstrual periods. There are two types. Primary amenorrhea means you never start your periods. Secondary amenorrhea means you have had periods, and then they stop, especially for more than 3 months. Even if you don't have periods, you could still get pregnant. You may not know what caused your periods to stop. Possible causes include pregnancy, hormonal changes, and losing or gaining a lot of weight quickly. Some medicines and stress could also cause it. Being active in endurance sports can also cause you to miss your period or stop menstruating. Female athletes may try to lose or maintain weight in harmful ways. These include dieting too much or binging and purging. But doing these things can lead to eating disorders, amenorrhea, and osteoporosis. If you exercise less or gain a little weight, your periods will probably start again.   Your doctor may order tests to find out why your periods have stopped. Your doctor may give you the hormone progestin. It can cause you to have a period. Talk to your doctor if you do not have a period for 3 months or more. Going for a long amount of time without a period can raise your chance of getting cancer of the lining of the uterus later in life. Follow-up care is a key part of your treatment and safety. Be sure to make and go to all appointments, and call your doctor if you are having problems. It's also a good idea to know your test results and keep a list of the medicines you take. How can you care for yourself at home? · Eat a healthy, balanced diet. This includes fruits, vegetables, whole grains, proteins, and low-fat dairy products. · Do light exercise, unless your doctor told you not to exercise. · Use birth control if you do not want to get pregnant. When should you call for help? Call your doctor now or seek immediate medical care if:    · You have severe vaginal bleeding.     · You have new or worse belly or pelvic pain.    Watch closely for changes in your health, and be sure to contact your doctor if:    · You have unusual vaginal bleeding.     · You think you might be pregnant.     · You do not get better as expected. Where can you learn more? Go to https://InnometricspePlayrific.[employer]. org and sign in to your [employer] account. Enter [id number] in the State [facility] health facility box to learn more about \"Secondary Amenorrhea: Care Instructions. \"     If you do not have an account, please click on the \"Sign Up Now\" link. Current as of: February 19, 2019  Content Version: 12.3  © 5909-7012 [employer]. Care instructions adapted under license by [facility] ([facility]). If you have questions about a medical condition or this instruction, always ask your healthcare professional. [address] any warranty or liability for your use of this information. Antibiotics:     To prevent antibiotic resistances

## 2020-02-25 DIAGNOSIS — N89.8 VAGINAL DISCHARGE: ICD-10-CM

## 2020-02-26 LAB
CLUE CELLS: NORMAL
TRICHOMONAS PREP: NORMAL
TRICHOMONAS VAGINALIS SCREEN: NEGATIVE
URINE CULTURE, ROUTINE: NORMAL
YEAST WET PREP: NORMAL

## 2020-03-02 LAB
C TRACH DNA GENITAL QL NAA+PROBE: NEGATIVE
N. GONORRHOEAE DNA: NEGATIVE

## 2020-04-07 ENCOUNTER — VIRTUAL VISIT (OUTPATIENT)
Dept: FAMILY MEDICINE CLINIC | Age: 30
End: 2020-04-07
Payer: COMMERCIAL

## 2020-04-07 PROCEDURE — 99213 OFFICE O/P EST LOW 20 MIN: CPT | Performed by: INTERNAL MEDICINE

## 2020-04-07 PROCEDURE — G8427 DOCREV CUR MEDS BY ELIG CLIN: HCPCS | Performed by: INTERNAL MEDICINE

## 2020-04-07 RX ORDER — FLUVOXAMINE MALEATE 25 MG
25 TABLET ORAL NIGHTLY
Qty: 30 TABLET | Refills: 1 | Status: SHIPPED | OUTPATIENT
Start: 2020-04-07 | End: 2020-06-01

## 2020-04-07 RX ORDER — AMITRIPTYLINE HYDROCHLORIDE 25 MG/1
25 TABLET, FILM COATED ORAL NIGHTLY
Qty: 30 TABLET | Refills: 5
Start: 2020-04-07 | End: 2020-09-30 | Stop reason: ALTCHOICE

## 2020-04-07 ASSESSMENT — ENCOUNTER SYMPTOMS
ABDOMINAL PAIN: 0
SHORTNESS OF BREATH: 0
BACK PAIN: 0
EYE PAIN: 0

## 2020-04-07 NOTE — PATIENT INSTRUCTIONS
fluvoxamine is safe for you, tell your doctor if you have ever had:  · liver or kidney disease;  · narrow-angle glaucoma;  · heart disease, high blood pressure, or a stroke;  · a bleeding or blood clotting disorder;  · seizures or epilepsy;  · bipolar disorder (manic depression); or  · low levels or sodium in your blood (an electrolyte imbalance). Some medicines can interact with fluvoxamine and cause a serious condition called serotonin syndrome. Be sure your doctor knows if you also take stimulant medicine, opioid medicine, herbal products, or medicine for depression, mental illness, Parkinson's disease, migraine headaches, serious infections, or prevention of nausea and vomiting. Ask your doctor before making any changes in how or when you take your medications. Some young people have thoughts about suicide when first taking an antidepressant. Your doctor should check your progress at regular visits. Your family or other caregivers should also be alert to changes in your mood or symptoms. Tell your doctor right away if you become pregnant while taking this medicine. Fluvoxamine may cause serious lung problems or other complications in a  if you take the medication during late pregnancy. However, you may have a relapse of OCD symptoms if you stop taking fluvoxamine. Do not start or stop taking this medicine during pregnancy without your doctor's advice. Fluvoxamine can pass into breast milk and may harm a nursing baby. You should not breast-feed while you are using fluvoxamine. Do not give this medicine to anyone under 25years old without medical advice. How should I take fluvoxamine? Fluvoxamine is usually taken at night. Follow all directions on your prescription label. Your doctor may occasionally change your dose. Do not take this medicine in larger or smaller amounts or for longer than recommended. You may take fluvoxamine with or without food.   Do not crush, chew, break, or open an

## 2020-05-22 ENCOUNTER — NURSE TRIAGE (OUTPATIENT)
Dept: OTHER | Facility: CLINIC | Age: 30
End: 2020-05-22

## 2020-05-22 ENCOUNTER — TELEPHONE (OUTPATIENT)
Dept: ADMINISTRATIVE | Age: 30
End: 2020-05-22

## 2020-05-22 ENCOUNTER — OFFICE VISIT (OUTPATIENT)
Dept: FAMILY MEDICINE CLINIC | Age: 30
End: 2020-05-22
Payer: COMMERCIAL

## 2020-05-22 VITALS
SYSTOLIC BLOOD PRESSURE: 124 MMHG | HEIGHT: 66 IN | HEART RATE: 95 BPM | TEMPERATURE: 97.3 F | RESPIRATION RATE: 12 BRPM | DIASTOLIC BLOOD PRESSURE: 76 MMHG | WEIGHT: 180 LBS | BODY MASS INDEX: 28.93 KG/M2 | OXYGEN SATURATION: 98 %

## 2020-05-22 PROCEDURE — 1036F TOBACCO NON-USER: CPT | Performed by: NURSE PRACTITIONER

## 2020-05-22 PROCEDURE — 99213 OFFICE O/P EST LOW 20 MIN: CPT | Performed by: NURSE PRACTITIONER

## 2020-05-22 PROCEDURE — G8417 CALC BMI ABV UP PARAM F/U: HCPCS | Performed by: NURSE PRACTITIONER

## 2020-05-22 PROCEDURE — G8427 DOCREV CUR MEDS BY ELIG CLIN: HCPCS | Performed by: NURSE PRACTITIONER

## 2020-05-22 NOTE — PATIENT INSTRUCTIONS
Patient Education        Learning About RICE (Rest, Ice, Compression, and Elevation)  What is RICE? RICE is a way to care for an injury. RICE helps relieve pain and swelling. It may also help with healing and flexibility. RICE stands for:  · R est and protect the injured or sore area. · I ce or a cold pack used as soon as possible. · C ompression, or wrapping the injured or sore area with an elastic bandage. · E levation (propping up) the injured or sore area. How do you do RICE? You can use RICE for home treatment when you have general aches and pains or after an injury or surgery. Rest  · Do not put weight on the injury for at least 24 to 48 hours. · Use crutches for a badly sprained knee or ankle. · Support a sprained wrist, elbow, or shoulder with a sling. Ice  · Put ice or a cold pack on the injury right away to reduce pain and swelling. Frozen vegetables will also work as an ice pack. Put a thin cloth between the ice or cold pack and your skin. The cloth protects the injured area from getting too cold. · Use ice for 10 to 15 minutes at a time for the first 48 to 72 hours. Compression  · Use compression for sprains, strains, and surgeries of the arms and legs. · Wrap the injured area with an elastic bandage or compression sleeve to reduce swelling. · Don't wrap it too tightly. If the area below it feels numb, tingles, or feels cool, loosen the wrap. Elevation  · Use elevation for areas of the body that can be propped up, such as arms and legs. · Prop up the injured area on pillows whenever you use ice. Keep it propped up anytime you sit or lie down. · Try to keep the injured area at or above the level of your heart. This will help reduce swelling and bruising. Where can you learn more? Go to https://Solxmac.[employer]. org and sign in to your [employer] account.  Enter F116 in the [employer] box to learn more about \"Learning About RICE (Rest, Ice, Compression, and

## 2020-05-22 NOTE — PROGRESS NOTES
Other Son         pyloric stenosis / OR at 10 weeks age       Review of Systems   Constitutional: Negative for activity change, fatigue and fever. Musculoskeletal: Positive for arthralgias. Negative for myalgias. Skin: Negative for color change, pallor and wound. PMH, Surgical Hx, Family Hx, and Social Hx reviewed and updated. Objective  Vitals:    05/22/20 1209   BP: 124/76   Pulse: 95   Resp: 12   Temp: 97.3 °F (36.3 °C)   SpO2: 98%   Weight: 180 lb (81.6 kg)   Height: 5' 6\" (1.676 m)     BP Readings from Last 3 Encounters:   05/22/20 124/76   02/24/20 132/88   02/03/20 118/72     Wt Readings from Last 3 Encounters:   05/22/20 180 lb (81.6 kg)   03/11/20 180 lb (81.6 kg)   02/24/20 184 lb (83.5 kg)         Physical Exam  Vitals signs reviewed. Constitutional:       Appearance: Normal appearance. Eyes:      Conjunctiva/sclera: Conjunctivae normal.   Cardiovascular:      Rate and Rhythm: Normal rate. Pulses: Normal pulses. Pulmonary:      Effort: Pulmonary effort is normal.   Musculoskeletal:      Left wrist: She exhibits normal range of motion, no tenderness, no bony tenderness, no swelling, no effusion, no crepitus, no deformity and no laceration. Arms:    Skin:     General: Skin is warm and dry. Capillary Refill: Capillary refill takes less than 2 seconds. Coloration: Skin is not pale. Neurological:      Mental Status: She is alert and oriented to person, place, and time. Psychiatric:         Behavior: Behavior normal. Behavior is cooperative. Assessment & Plan    Diagnosis Orders   1. Acute pain of left wrist  XR WRIST LEFT (MIN 3 VIEWS)    SPLINT VELCRO WRIST   2.  Hand pain, left       Orders Placed This Encounter   Procedures    XR WRIST LEFT (MIN 3 VIEWS)     Standing Status:   Future     Number of Occurrences:   1     Standing Expiration Date:   5/22/2021     Order Specific Question:   Reason for exam:     Answer:   wrist pain    SPLINT VELCRO WRIST

## 2020-05-24 ASSESSMENT — ENCOUNTER SYMPTOMS: COLOR CHANGE: 0

## 2020-06-01 ENCOUNTER — VIRTUAL VISIT (OUTPATIENT)
Dept: FAMILY MEDICINE CLINIC | Age: 30
End: 2020-06-01
Payer: COMMERCIAL

## 2020-06-01 PROCEDURE — G0438 PPPS, INITIAL VISIT: HCPCS | Performed by: NURSE PRACTITIONER

## 2020-06-01 RX ORDER — FLUVOXAMINE MALEATE 25 MG
25 TABLET ORAL NIGHTLY
Qty: 30 TABLET | Refills: 0 | Status: SHIPPED | OUTPATIENT
Start: 2020-06-01 | End: 2020-06-05 | Stop reason: ALTCHOICE

## 2020-06-01 ASSESSMENT — LIFESTYLE VARIABLES: HOW OFTEN DO YOU HAVE A DRINK CONTAINING ALCOHOL: 0

## 2020-06-01 ASSESSMENT — PATIENT HEALTH QUESTIONNAIRE - PHQ9: SUM OF ALL RESPONSES TO PHQ QUESTIONS 1-9: 17

## 2020-06-01 NOTE — PROGRESS NOTES
had an eye exam within the past year?: (!) No  Hearing/Vision Interventions:  · Vision concerns:  patient declines any further evaluation/treatment for this issue    Personalized Preventive Plan   Current Health Maintenance Status  Immunization History   Administered Date(s) Administered    DTP 02/07/1990, 05/02/1990, 07/11/1990, 08/11/1995    DTaP 08/07/1994    DTaP (Infanrix) 08/07/1994    HPV Quadrivalent (Gardasil) 06/02/2008, 08/04/2008, 12/30/2008    Hepatitis B 08/25/1993, 09/27/1993, 02/28/1994    Hepatitis B vaccine 08/25/1993, 09/27/1993, 02/28/1994    Influenza Vaccine, unspecified formulation 02/23/2018    Influenza, Quadv, IM, (6 mo and older Fluzone, Flulaval, Fluarix and 3 yrs and older Afluria) 02/23/2018    MMR 05/01/1991, 06/18/2001    Meningococcal ACWY Vaccine 09/29/2006    Meningococcal, MCV4, Unspecifd Conjugate (A,C,Y and W-135) 09/29/2006    PPD Test 06/18/2001, 12/12/2011, 01/23/2012, 01/23/2012, 04/17/2013    Polio OPV 02/07/1990, 05/02/1990, 08/07/1991, 08/11/1995    Tdap (Boostrix, Adacel) 09/29/2006        Health Maintenance   Topic Date Due    Varicella vaccine (1 of 2 - 2-dose childhood series) 12/15/1990    DTaP/Tdap/Td vaccine (6 - Td) 09/29/2016    Annual Wellness Visit (AWV)  05/29/2019    Flu vaccine (Season Ended) 09/01/2020    Cervical cancer screen  09/23/2022    Hepatitis B vaccine  Completed    Meningococcal (ACWY) vaccine  Completed    HIV screen  Completed    Hepatitis A vaccine  Aged Out    Hib vaccine  Aged Out    Pneumococcal 0-64 years Vaccine  Aged Out     Recommendations for Social Mo[employer] Due: see orders and patient instructions/AVS.  . Recommended screening schedule for the next 5-10 years is provided to the patient in written form: see Patient Instructions/AVS.    Giles Jordyn was seen today for medicare awv.     Diagnoses and all orders for this visit:    Routine general medical examination at a health care facility    Depression, unspecified depression type  -     Maricruz Li, PhD, Psychology, Amie Crowe is a 26 y.o. female being evaluated by a Virtual Visit (phone) encounter to address concerns as mentioned above. A caregiver was present when appropriate. Due to this being a TeleHealth encounter ([facility]), evaluation of the following organ systems was limited: Vitals/Constitutional/EENT/Resp/CV/GI//MS/Neuro/Skin/Heme-Lymph-Imm. Pursuant to the emergency declaration under the [address] [place] [address], [address] [place] [address] authority and the [employer] and [employer] Act, this Virtual Visit was conducted with patient's (and/or legal guardian's) consent, to reduce the patient's risk of exposure to COVID-19 and provide necessary medical care. The patient (and/or legal guardian) has also been advised to contact this office for worsening conditions or problems, and seek emergency medical treatment and/or call 911 if deemed necessary. Patient identification was verified at the start of the visit: Yes    Services were provided through phone to substitute for in-person clinic visit. Patient and provider were located at their individual homes. --Monico Puckett, MARIAMA - CNP on 6/1/2020 at 4:01 PM    An electronic signature was used to authenticate this note.

## 2020-06-05 ENCOUNTER — OFFICE VISIT (OUTPATIENT)
Dept: PRIMARY CARE CLINIC | Age: 30
End: 2020-06-05
Payer: COMMERCIAL

## 2020-06-05 VITALS
DIASTOLIC BLOOD PRESSURE: 88 MMHG | HEIGHT: 66 IN | RESPIRATION RATE: 16 BRPM | BODY MASS INDEX: 28.93 KG/M2 | OXYGEN SATURATION: 96 % | WEIGHT: 180 LBS | SYSTOLIC BLOOD PRESSURE: 138 MMHG | HEART RATE: 113 BPM | TEMPERATURE: 98 F

## 2020-06-05 DIAGNOSIS — Z20.822 ENCOUNTER FOR LABORATORY TESTING FOR COVID-19 VIRUS: ICD-10-CM

## 2020-06-05 PROCEDURE — 99213 OFFICE O/P EST LOW 20 MIN: CPT | Performed by: PHYSICIAN ASSISTANT

## 2020-06-05 PROCEDURE — 1036F TOBACCO NON-USER: CPT | Performed by: PHYSICIAN ASSISTANT

## 2020-06-05 PROCEDURE — G8427 DOCREV CUR MEDS BY ELIG CLIN: HCPCS | Performed by: PHYSICIAN ASSISTANT

## 2020-06-05 PROCEDURE — G8417 CALC BMI ABV UP PARAM F/U: HCPCS | Performed by: PHYSICIAN ASSISTANT

## 2020-06-05 ASSESSMENT — ENCOUNTER SYMPTOMS
DIARRHEA: 0
NAUSEA: 0
SORE THROAT: 1
RHINORRHEA: 1
COUGH: 0

## 2020-06-05 NOTE — PROGRESS NOTES
Subjective     Bridger Angeles 26 y.o. female presents 20 with   Chief Complaint   Patient presents with    Asthma     x 2 days    Shortness of Breath    Pharyngitis    Nasal Congestion       URI    This is a new problem. The current episode started in the past 7 days. The problem has been gradually worsening. There has been no fever. Associated symptoms include headaches, rhinorrhea, sneezing and a sore throat. Pertinent negatives include no chest pain, congestion, coughing, diarrhea or nausea. She has tried nothing for the symptoms. No sick contacts or travel      Reviewed the following history:    Past Medical History:   Diagnosis Date    Arthritis     spine & fingers    Asthma     since childhood    Chronic back pain     Chronic headaches     Depression     Depression     PONV (postoperative nausea and vomiting)     Postpartum depression 2017    Seizures ([facility] [address]      Past Surgical History:   Procedure Laterality Date    APPENDECTOMY  2016    CERVICAL LAMINECTOMY Left 2019    POSTERIOR FORAMINOTOMIES C 5-6, C 6-7 LEFT performed by Luigi Kang MD at [address] [place] [address] N/A 2017     SECTION performed by Panchito Camacho MD at [facility]    LAPAROSCOPIC APPENDECTOMY  2016    Dr Brittney Limon History   Problem Relation Age of Onset    Depression Mother     Arthritis Mother         TKR    High Blood Pressure Father     Diabetes Father     Arthritis Maternal Grandmother     Cancer Maternal Grandmother         Unsure    Diabetes Maternal Grandmother     Arthritis Paternal Grandmother     Diabetes Paternal Grandmother     Stroke Paternal Grandmother     Cancer Paternal Grandfather         Colon? ? She's not sure     Arthritis Sister     No Known Problems Brother     Other Son         pyloric stenosis / OR at 11 weeks age       Allergies   Allergen Reactions    Citalopram      Was ineffective     Effexor

## 2020-06-07 LAB
SARS-COV-2: NOT DETECTED
SOURCE: NORMAL

## 2020-06-18 ENCOUNTER — TELEPHONE (OUTPATIENT)
Dept: FAMILY MEDICINE CLINIC | Age: 30
End: 2020-06-18

## 2020-06-18 ENCOUNTER — TELEMEDICINE (OUTPATIENT)
Dept: FAMILY MEDICINE CLINIC | Age: 30
End: 2020-06-18
Payer: COMMERCIAL

## 2020-06-18 PROCEDURE — 99213 OFFICE O/P EST LOW 20 MIN: CPT | Performed by: INTERNAL MEDICINE

## 2020-06-18 PROCEDURE — G8427 DOCREV CUR MEDS BY ELIG CLIN: HCPCS | Performed by: INTERNAL MEDICINE

## 2020-06-18 ASSESSMENT — ENCOUNTER SYMPTOMS
BACK PAIN: 0
ABDOMINAL PAIN: 0
SHORTNESS OF BREATH: 0
EYE PAIN: 0

## 2020-06-18 NOTE — PROGRESS NOTES
on file     Gets together: Not on file     Attends Muslim service: Not on file     Active member of club or organization: Not on file     Attends meetings of clubs or organizations: Not on file     Relationship status: Not on file    Intimate partner violence     Fear of current or ex partner: Not on file     Emotionally abused: Not on file     Physically abused: Not on file     Forced sexual activity: Not on file   Other Topics Concern    Not on file   Social History Narrative    Not on file     Allergies   Allergen Reactions    Citalopram      Was ineffective     Effexor [Venlafaxine]      MADE HER SUICIDAL    Fluvoxamine     Geodon [Ziprasidone Hcl] Other (See Comments)     tremors    Metronidazole Diarrhea and Nausea Only    Seasonal Other (See Comments)     Sinus sx    Sertraline      Stomach aches     Trintellix [Vortioxetine]     Vilazodone      WEIGHT GAIN     Wellbutrin [Bupropion]      SEIZURE    Onion Rash     stomach ache     Current Outpatient Medications on File Prior to Visit   Medication Sig Dispense Refill    amitriptyline (ELAVIL) 25 MG tablet Take 1 tablet by mouth nightly 30 tablet 5    norgestimate-ethinyl estradiol (SPRINTEC 28) 0.25-35 MG-MCG per tablet 1 tablet by mouth daily please skip sugar pill 1 packet 14    VENTOLIN  (90 Base) MCG/ACT inhaler inhale 2 puffs by mouth four times a day if needed  0     No current facility-administered medications on file prior to visit. I have personally reviewed the ROS, PMH, PFH, and social history     Review of Systems   Constitutional: Negative for chills and fever. HENT: Negative for congestion. Eyes: Negative for pain. Respiratory: Negative for shortness of breath. Cardiovascular: Negative for chest pain. Gastrointestinal: Negative for abdominal pain. Genitourinary: Negative for hematuria. Musculoskeletal: Negative for back pain. Allergic/Immunologic: Negative for immunocompromised state.

## 2020-07-21 ENCOUNTER — VIRTUAL VISIT (OUTPATIENT)
Dept: BEHAVIORAL/MENTAL HEALTH CLINIC | Age: 30
End: 2020-07-21
Payer: COMMERCIAL

## 2020-07-21 PROCEDURE — 90791 PSYCH DIAGNOSTIC EVALUATION: CPT | Performed by: PSYCHOLOGIST

## 2020-07-21 ASSESSMENT — PATIENT HEALTH QUESTIONNAIRE - PHQ9
5. POOR APPETITE OR OVEREATING: 2
7. TROUBLE CONCENTRATING ON THINGS, SUCH AS READING THE NEWSPAPER OR WATCHING TELEVISION: 2
8. MOVING OR SPEAKING SO SLOWLY THAT OTHER PEOPLE COULD HAVE NOTICED. OR THE OPPOSITE, BEING SO FIGETY OR RESTLESS THAT YOU HAVE BEEN MOVING AROUND A LOT MORE THAN USUAL: 1
6. FEELING BAD ABOUT YOURSELF - OR THAT YOU ARE A FAILURE OR HAVE LET YOURSELF OR YOUR FAMILY DOWN: 2
4. FEELING TIRED OR HAVING LITTLE ENERGY: 3
10. IF YOU CHECKED OFF ANY PROBLEMS, HOW DIFFICULT HAVE THESE PROBLEMS MADE IT FOR YOU TO DO YOUR WORK, TAKE CARE OF THINGS AT HOME, OR GET ALONG WITH OTHER PEOPLE: 2
2. FEELING DOWN, DEPRESSED OR HOPELESS: 2
9. THOUGHTS THAT YOU WOULD BE BETTER OFF DEAD, OR OF HURTING YOURSELF: 0
3. TROUBLE FALLING OR STAYING ASLEEP: 3
1. LITTLE INTEREST OR PLEASURE IN DOING THINGS: 2
SUM OF ALL RESPONSES TO PHQ QUESTIONS 1-9: 17
SUM OF ALL RESPONSES TO PHQ QUESTIONS 1-9: 17
SUM OF ALL RESPONSES TO PHQ9 QUESTIONS 1 & 2: 4
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
## 2020-08-04 ENCOUNTER — VIRTUAL VISIT (OUTPATIENT)
Dept: BEHAVIORAL/MENTAL HEALTH CLINIC | Age: 30
End: 2020-08-04
Payer: COMMERCIAL

## 2020-08-04 PROCEDURE — 90832 PSYTX W PT 30 MINUTES: CPT | Performed by: PSYCHOLOGIST

## 2020-08-04 ASSESSMENT — PATIENT HEALTH QUESTIONNAIRE - PHQ9
6. FEELING BAD ABOUT YOURSELF - OR THAT YOU ARE A FAILURE OR HAVE LET YOURSELF OR YOUR FAMILY DOWN: 3
2. FEELING DOWN, DEPRESSED OR HOPELESS: 2
8. MOVING OR SPEAKING SO SLOWLY THAT OTHER PEOPLE COULD HAVE NOTICED. OR THE OPPOSITE, BEING SO FIGETY OR RESTLESS THAT YOU HAVE BEEN MOVING AROUND A LOT MORE THAN USUAL: 1
5. POOR APPETITE OR OVEREATING: 2
SUM OF ALL RESPONSES TO PHQ QUESTIONS 1-9: 17
10. IF YOU CHECKED OFF ANY PROBLEMS, HOW DIFFICULT HAVE THESE PROBLEMS MADE IT FOR YOU TO DO YOUR WORK, TAKE CARE OF THINGS AT HOME, OR GET ALONG WITH OTHER PEOPLE: 1
4. FEELING TIRED OR HAVING LITTLE ENERGY: 3
SUM OF ALL RESPONSES TO PHQ QUESTIONS 1-9: 17
SUM OF ALL RESPONSES TO PHQ9 QUESTIONS 1 & 2: 4
7. TROUBLE CONCENTRATING ON THINGS, SUCH AS READING THE NEWSPAPER OR WATCHING TELEVISION: 3
9. THOUGHTS THAT YOU WOULD BE BETTER OFF DEAD, OR OF HURTING YOURSELF: 0
3. TROUBLE FALLING OR STAYING ASLEEP: 1
1. LITTLE INTEREST OR PLEASURE IN DOING THINGS: 2

## 2020-08-04 NOTE — PROGRESS NOTES
Behavioral Health Consultation  Opal Steinberg Psy.D. Psychologist  8/4/20  10:02 AM EDT      Time spent with Patient: 25 minutes  This is patient's second  [facility] appointment. Reason for Consult:  depression  Referring Provider: Manpreet Kendrick MD      Feedback given to PCP. TELEHEALTH EVALUATION -- Audio and/or Visual (During DZQMY-34 public health emergency)    Due to COVID 19 outbreak, patient's office visit was converted to a virtual visit. Patient was contacted and agreed to proceed with a virtual visit via [employer]. Patient reports that they are located at home. Provider located at home office. The risks and benefits of converting to a virtual visit were discussed in light of the current infectious disease epidemic. Patient also understood that insurance coverage and co-pays are up to their individual insurance plans. Patient provides verbal consent for this visit to be billed to insurance. Pursuant to the emergency declaration under the [facility] waiver authority and the [employer] and [employer] Act, this Virtual  Visit was conducted, with patient's consent, to reduce the patient's risk of exposure to COVID-19 and provide continuity of care for an established patient. Services were provided through an audio and video synchronous discussion virtually to substitute for in-person clinic visit. S:  Pt described some recent stress associated with custody of her son. She reports that her mom has had temporary custody of him for the past two years. She reports that she has a hard time taking care of her son noting that she is scared of having no time for herself. She also reports that she does not know how to interact with him well.   She notes many barriers to him being with her, primarily that she feels that it is hard for her to balance her time and worries about not being able to do things she wants to do.  Pt is unclear if it is a goal for her to have her son back with her more often or not. Pt reports that sh is often feeling lonely and described some peer related issues. She is still working a few hours a few times per week. 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 Pt did not yet review the information on depression. She plans to do so prior to the next appointment. Pt denies SI and HI.     O:  MSE:  Appearance    alert, cooperative   Personal Hygiene : appropriately dressed, appropriately groomed and healthy looking  Appetite abnormal: inc  Sleep disturbance Yes, including: non-restful sleep  Fatigue Yes  Loss of pleasure Yes  Impulsive behavior No  Speech    spontaneous, normal rate and normal volume  Mood   depressed   Affect    depressed affect  Thought Content    intact  Thought Process    linear, goal directed and coherent  Associations    logical connections  Insight    poor  Judgment    fair  Orientation    oriented to person, place, time, and general circumstances  Memory    recent and remote memory intact  Attention/Concentration    intact  Morbid ideation No  Suicide Assessment    no suicidal ideation         History:    Medications:   Current Outpatient Medications   Medication Sig Dispense Refill    amitriptyline (ELAVIL) 25 MG tablet Take 1 tablet by mouth nightly 30 tablet 5    norgestimate-ethinyl estradiol (SPRINTEC 28) 0.25-35 MG-MCG per tablet 1 tablet by mouth daily please skip sugar pill 1 packet 14    VENTOLIN  (90 Base) MCG/ACT inhaler inhale 2 puffs by mouth four times a day if needed  0     No current facility-administered medications for this visit.         Social History:   Social History     Socioeconomic History    Marital status: Single     Spouse name: Not on file    Number of children: Not on file    Years of education: Not on file    Highest education level: Not on file   Occupational History    Not on file Social Needs    Financial resource strain: Not on file    Food insecurity     Worry: Not on file     Inability: Not on file    Transportation needs     Medical: Not on file     Non-medical: Not on file   Tobacco Use    Smoking status: Never Smoker    Smokeless tobacco: Never Used   Substance and Sexual Activity    Alcohol use: Yes     Alcohol/week: 0.0 standard drinks     Comment: rare social    Drug use: Yes     Types: Marijuana     Comment: vape daily    Sexual activity: Yes     Partners: Male   Lifestyle    Physical activity     Days per week: Not on file     Minutes per session: Not on file    Stress: Not on file   Relationships    Social connections     Talks on phone: Not on file     Gets together: Not on file     Attends Synagogue service: Not on file     Active member of club or organization: Not on file     Attends meetings of clubs or organizations: Not on file     Relationship status: Not on file    Intimate partner violence     Fear of current or ex partner: Not on file     Emotionally abused: Not on file     Physically abused: Not on file     Forced sexual activity: Not on file   Other Topics Concern    Not on file   Social History Narrative    Not on file       TOBACCO:   reports that she has never smoked. She has never used smokeless tobacco.  ETOH:   reports current alcohol use. Family History:   Family History   Problem Relation Age of Onset    Depression Mother     Arthritis Mother         TKR    High Blood Pressure Father         No congenital heart problems, no aneurysms.  Diabetes Father         Donnell Nieto might have had colon cancer she will update me.  Arthritis Maternal Grandmother     Cancer Maternal Grandmother         Unsure    Diabetes Maternal Grandmother     Arthritis Paternal Grandmother     Diabetes Paternal Grandmother     Stroke Paternal Grandmother     Cancer Paternal Grandfather         Colon? ? She's not sure     Arthritis Sister     No Known Problems Brother     Other Son         pyloric stenosis / OR at 10 weeks age         A:  Administered the PHQ9 which indicates a self report of moderately severe symptom distress. Pt would benefit from [facility] services to increase coping skills to provide symptom management/control/relief. PHQ Scores 8/4/2020 7/21/2020 6/1/2020 2/23/2018   PHQ2 Score 4 4 6 4   PHQ9 Score 17 17 17 14     Interpretation of Total Score Depression Severity: 1-4 = Minimal depression, 5-9 = Mild depression, 10-14 = Moderate depression, 15-19 = Moderately severe depression, 20-27 = Severe depression      Diagnosis:  Persistent Depressive Disorder with Anxious Distress      Plan:  Pt interventions:  Discussed various factors related to the development and maintenance of  depression (including biological, cognitive, behavioral, and environmental factors), Trained in strategies for increasing balanced thinking, Discussed self-care (sleep, nutrition, rewarding activities, social support, exercise), Motivational Interviewing to determine importance and readiness for change, Discussed potential barriers to change, [place]-setting to identify pt's primary goals for [facility] visit / overall health, Supportive techniques, Emphasized self-care as important for managing overall health and Identified maladaptive thoughts      Pt Behavioral Change Plan:  Review information on depression that was sent after last visit  Follow-up in 1 to 2 weeks      Please note this report has been partially produced using speech recognition software  And may cause contain errors related to that system including grammar, punctuation and spelling as well as words and phrases that may seem inappropriate. If there are questions or concerns please feel free to contact me to clarify.

## 2020-08-12 ENCOUNTER — VIRTUAL VISIT (OUTPATIENT)
Dept: BEHAVIORAL/MENTAL HEALTH CLINIC | Age: 30
End: 2020-08-12
Payer: COMMERCIAL

## 2020-08-12 PROCEDURE — 90832 PSYTX W PT 30 MINUTES: CPT | Performed by: PSYCHOLOGIST

## 2020-08-12 ASSESSMENT — PATIENT HEALTH QUESTIONNAIRE - PHQ9
SUM OF ALL RESPONSES TO PHQ9 QUESTIONS 1 & 2: 5
9. THOUGHTS THAT YOU WOULD BE BETTER OFF DEAD, OR OF HURTING YOURSELF: 0
8. MOVING OR SPEAKING SO SLOWLY THAT OTHER PEOPLE COULD HAVE NOTICED. OR THE OPPOSITE, BEING SO FIGETY OR RESTLESS THAT YOU HAVE BEEN MOVING AROUND A LOT MORE THAN USUAL: 1
SUM OF ALL RESPONSES TO PHQ QUESTIONS 1-9: 19
10. IF YOU CHECKED OFF ANY PROBLEMS, HOW DIFFICULT HAVE THESE PROBLEMS MADE IT FOR YOU TO DO YOUR WORK, TAKE CARE OF THINGS AT HOME, OR GET ALONG WITH OTHER PEOPLE: 2
5. POOR APPETITE OR OVEREATING: 2
4. FEELING TIRED OR HAVING LITTLE ENERGY: 3
3. TROUBLE FALLING OR STAYING ASLEEP: 2
SUM OF ALL RESPONSES TO PHQ QUESTIONS 1-9: 19
2. FEELING DOWN, DEPRESSED OR HOPELESS: 3
1. LITTLE INTEREST OR PLEASURE IN DOING THINGS: 2
7. TROUBLE CONCENTRATING ON THINGS, SUCH AS READING THE NEWSPAPER OR WATCHING TELEVISION: 3
6. FEELING BAD ABOUT YOURSELF - OR THAT YOU ARE A FAILURE OR HAVE LET YOURSELF OR YOUR FAMILY DOWN: 3
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
## 2020-08-18 ENCOUNTER — VIRTUAL VISIT (OUTPATIENT)
Dept: BEHAVIORAL/MENTAL HEALTH CLINIC | Age: 30
End: 2020-08-18
Payer: COMMERCIAL

## 2020-08-18 PROCEDURE — 90832 PSYTX W PT 30 MINUTES: CPT | Performed by: PSYCHOLOGIST

## 2020-08-18 ASSESSMENT — PATIENT HEALTH QUESTIONNAIRE - PHQ9
SUM OF ALL RESPONSES TO PHQ QUESTIONS 1-9: 20
8. MOVING OR SPEAKING SO SLOWLY THAT OTHER PEOPLE COULD HAVE NOTICED. OR THE OPPOSITE, BEING SO FIGETY OR RESTLESS THAT YOU HAVE BEEN MOVING AROUND A LOT MORE THAN USUAL: 1
1. LITTLE INTEREST OR PLEASURE IN DOING THINGS: 2
5. POOR APPETITE OR OVEREATING: 1
3. TROUBLE FALLING OR STAYING ASLEEP: 2
SUM OF ALL RESPONSES TO PHQ9 QUESTIONS 1 & 2: 5
10. IF YOU CHECKED OFF ANY PROBLEMS, HOW DIFFICULT HAVE THESE PROBLEMS MADE IT FOR YOU TO DO YOUR WORK, TAKE CARE OF THINGS AT HOME, OR GET ALONG WITH OTHER PEOPLE: 3
2. FEELING DOWN, DEPRESSED OR HOPELESS: 3
7. TROUBLE CONCENTRATING ON THINGS, SUCH AS READING THE NEWSPAPER OR WATCHING TELEVISION: 3
SUM OF ALL RESPONSES TO PHQ QUESTIONS 1-9: 20
4. FEELING TIRED OR HAVING LITTLE ENERGY: 3
9. THOUGHTS THAT YOU WOULD BE BETTER OFF DEAD, OR OF HURTING YOURSELF: 2
6. FEELING BAD ABOUT YOURSELF - OR THAT YOU ARE A FAILURE OR HAVE LET YOURSELF OR YOUR FAMILY DOWN: 3

## 2020-08-18 NOTE — PROGRESS NOTES
Behavioral Health Consultation  Corinne Rom, Psy.D. Psychologist  8/18/20  2:32 PM EDT      Time spent with Patient: 30 minutes  This is patient's fourth  [facility] appointment. Reason for Consult:  depression  Referring Provider: Guerline Springer MD      Feedback given to PCP. TELEHEALTH EVALUATION -- Audio and/or Visual (During RRFLI-32 public health emergency)    Due to COVID 19 outbreak, patient's office visit was converted to a virtual visit. Patient was contacted and agreed to proceed with a virtual visit via [employer] me. Patient reports that they are located at home. Provider located at home office. The risks and benefits of converting to a virtual visit were discussed in light of the current infectious disease epidemic. Patient also understood that insurance coverage and co-pays are up to their individual insurance plans. Patient provides verbal consent for this visit to be billed to insurance. Pursuant to the emergency declaration under the [facility], [facility] waiver authority and the [employer] and [employer] Act, this Virtual  Visit was conducted, with patient's consent, to reduce the patient's risk of exposure to COVID-19 and provide continuity of care for an established patient. Services were provided through an audio and video synchronous discussion virtually to substitute for in-person clinic visit. S:  Pt reports ongoing loneliness and depression. She reports, however, that she went to a party last Saturday. She reports that she continues to struggle with wanting a romantic relationship but feeling that she can't find someone she wants to date. She describes that the guys that have been overwhelming with reaching out to her. Pt acknowledges that there may be some ways in which she is preventing herself from getting into a relationship.   Pt also notes that she does not reach out to people because she is worried about rejection. Pt reports that she continues to feel abandoned by her father for moving to [place]. She also feels that her mother is rude to her. Discussed family dynamics. Pt reports ongoing distress associated with her high school ex-boyfriend that she was recently in contact with. Discussed DBT and pt will look into this. Pt endorses some passive morbid ideation but specifically denies intent to self-harm. O:  MSE:    Appearance    alert, cooperative   Personal Hygiene : appropriately dressed, appropriately groomed and healthy looking  Appetite abnormal: inc  Sleep disturbance Yes, including: non-restful sleep  Fatigue Yes  Loss of pleasure Yes  Impulsive behavior No  Speech    spontaneous, normal rate and normal volume  Mood   depressed   Affect    depressed affect  Thought Content    intact  Thought Process    linear, goal directed and coherent  Associations    logical connections  Insight    poor  Judgment    fair  Orientation    oriented to person, place, time, and general circumstances  Memory    recent and remote memory intact  Attention/Concentration    intact  Morbid ideation No  Suicide Assessment    no suicidal ideation         History:    Medications:   Current Outpatient Medications   Medication Sig Dispense Refill    amitriptyline (ELAVIL) 25 MG tablet Take 1 tablet by mouth nightly 30 tablet 5    norgestimate-ethinyl estradiol (SPRINTEC 28) 0.25-35 MG-MCG per tablet 1 tablet by mouth daily please skip sugar pill 1 packet 14    VENTOLIN  (90 Base) MCG/ACT inhaler inhale 2 puffs by mouth four times a day if needed  0     No current facility-administered medications for this visit.         Social History:   Social History     Socioeconomic History    Marital status: Single     Spouse name: Not on file    Number of children: Not on file    Years of education: Not on file    Highest education level: Not on file   Occupational History    Not on file   Social Needs Brother     Other Son         pyloric stenosis / OR at 10 weeks age         A:  Administered the PHQ9 which indicates a self report of severe symptom distress. Pt would benefit from [facility] services to increase coping skills to provide symptom management/control/relief. PHQ Scores 8/18/2020 8/12/2020 8/4/2020 7/21/2020 6/1/2020 2/23/2018   PHQ2 Score 5 5 4 4 6 4   PHQ9 Score 20 19 17 17 17 14     Interpretation of Total Score Depression Severity: 1-4 = Minimal depression, 5-9 = Mild depression, 10-14 = Moderate depression, 15-19 = Moderately severe depression, 20-27 = Severe depression      Diagnosis:  Persistent Depressive Disorder with Anxious Distress   R/O Borderline Personality Disorder      Plan:  Pt interventions:  Trained in strategies for increasing balanced thinking, Discussed self-care (sleep, nutrition, rewarding activities, social support, exercise), Motivational Interviewing to determine importance and readiness for change, [place]-setting to identify pt's primary goals for [facility] visit / overall health, Supportive techniques, Emphasized self-care as important for managing overall health, Identified maladaptive thoughts and Problem-solving re: relationship problems  Reviewed options for identifying appropriate providers      Pt Behavioral Change Plan:  Contact [facility] regarding DBT   Follow up in 2 weeks      Please note this report has been partially produced using speech recognition software  And may cause contain errors related to that system including grammar, punctuation and spelling as well as words and phrases that may seem inappropriate. If there are questions or concerns please feel free to contact me to clarify.

## 2020-09-02 ENCOUNTER — TELEPHONE (OUTPATIENT)
Dept: FAMILY MEDICINE CLINIC | Age: 30
End: 2020-09-02

## 2020-09-02 ENCOUNTER — VIRTUAL VISIT (OUTPATIENT)
Dept: BEHAVIORAL/MENTAL HEALTH CLINIC | Age: 30
End: 2020-09-02
Payer: COMMERCIAL

## 2020-09-02 PROCEDURE — 90832 PSYTX W PT 30 MINUTES: CPT | Performed by: PSYCHOLOGIST

## 2020-09-02 ASSESSMENT — PATIENT HEALTH QUESTIONNAIRE - PHQ9
8. MOVING OR SPEAKING SO SLOWLY THAT OTHER PEOPLE COULD HAVE NOTICED. OR THE OPPOSITE, BEING SO FIGETY OR RESTLESS THAT YOU HAVE BEEN MOVING AROUND A LOT MORE THAN USUAL: 1
4. FEELING TIRED OR HAVING LITTLE ENERGY: 3
10. IF YOU CHECKED OFF ANY PROBLEMS, HOW DIFFICULT HAVE THESE PROBLEMS MADE IT FOR YOU TO DO YOUR WORK, TAKE CARE OF THINGS AT HOME, OR GET ALONG WITH OTHER PEOPLE: 2
2. FEELING DOWN, DEPRESSED OR HOPELESS: 3
1. LITTLE INTEREST OR PLEASURE IN DOING THINGS: 2
9. THOUGHTS THAT YOU WOULD BE BETTER OFF DEAD, OR OF HURTING YOURSELF: 2
6. FEELING BAD ABOUT YOURSELF - OR THAT YOU ARE A FAILURE OR HAVE LET YOURSELF OR YOUR FAMILY DOWN: 3
SUM OF ALL RESPONSES TO PHQ9 QUESTIONS 1 & 2: 5
7. TROUBLE CONCENTRATING ON THINGS, SUCH AS READING THE NEWSPAPER OR WATCHING TELEVISION: 2
5. POOR APPETITE OR OVEREATING: 2
3. TROUBLE FALLING OR STAYING ASLEEP: 3
SUM OF ALL RESPONSES TO PHQ QUESTIONS 1-9: 21
SUM OF ALL RESPONSES TO PHQ QUESTIONS 1-9: 21

## 2020-09-02 NOTE — PROGRESS NOTES
moving and does not speak to him often. Pt reports that she is also upset with her sister. Pt has difficulty identifying goals or targets for change. Discussed increasing socialization again and working on resolving feelings about ex-boyfriend, but she is ambivalent about the latter. Discussed specialty mental health, pariticularly DBT group. Pt states she is reluctant to call due to phone anxiety, but is willing to access online contact through email. She endorses passive morbid ideation, but denies SI and HI.      O:  MSE:  Appearance    alert, cooperative   Personal Hygiene : appropriately dressed, appropriately groomed and healthy looking  Appetite abnormal: inc  Sleep disturbance Yes, including: non-restful sleep  Fatigue Yes  Loss of pleasure Yes  Impulsive behavior No  Speech    spontaneous, normal rate and normal volume  Mood   depressed   Affect    depressed affect  Thought Content    intact  Thought Process    linear, goal directed and coherent  Associations    logical connections  Insight    poor  Judgment    fair  Orientation    oriented to person, place, time, and general circumstances  Memory    recent and remote memory intact  Attention/Concentration    intact  Morbid ideation No  Suicide Assessment    no suicidal ideation      History:    Medications:   Current Outpatient Medications   Medication Sig Dispense Refill    amitriptyline (ELAVIL) 25 MG tablet Take 1 tablet by mouth nightly 30 tablet 5    norgestimate-ethinyl estradiol (SPRINTEC 28) 0.25-35 MG-MCG per tablet 1 tablet by mouth daily please skip sugar pill 1 packet 14    VENTOLIN  (90 Base) MCG/ACT inhaler inhale 2 puffs by mouth four times a day if needed  0     No current facility-administered medications for this visit.         Social History:   Social History     Socioeconomic History    Marital status: Single     Spouse name: Not on file    Number of children: Not on file    Years of education: Not on file    Highest education level: Not on file   Occupational History    Not on file   Social Needs    Financial resource strain: Not on file    Food insecurity     Worry: Not on file     Inability: Not on file    Transportation needs     Medical: Not on file     Non-medical: Not on file   Tobacco Use    Smoking status: Never Smoker    Smokeless tobacco: Never Used   Substance and Sexual Activity    Alcohol use: Yes     Alcohol/week: 0.0 standard drinks     Comment: rare social    Drug use: Yes     Types: Marijuana     Comment: vape daily    Sexual activity: Yes     Partners: Male   Lifestyle    Physical activity     Days per week: Not on file     Minutes per session: Not on file    Stress: Not on file   Relationships    Social connections     Talks on phone: Not on file     Gets together: Not on file     Attends Spiritism service: Not on file     Active member of club or organization: Not on file     Attends meetings of clubs or organizations: Not on file     Relationship status: Not on file    Intimate partner violence     Fear of current or ex partner: Not on file     Emotionally abused: Not on file     Physically abused: Not on file     Forced sexual activity: Not on file   Other Topics Concern    Not on file   Social History Narrative    Not on file       TOBACCO:   reports that she has never smoked. She has never used smokeless tobacco.  ETOH:   reports current alcohol use. Family History:   Family History   Problem Relation Age of Onset    Depression Mother     Arthritis Mother         TKR    High Blood Pressure Father         No congenital heart problems, no aneurysms.  Diabetes Father         Georgina Hunter might have had colon cancer she will update me.      Arthritis Maternal Grandmother     Cancer Maternal Grandmother         Unsure    Diabetes Maternal Grandmother     Arthritis Paternal Grandmother     Diabetes Paternal Grandmother     Stroke Paternal Grandmother     Cancer Paternal Grandfather         Colon? ? She's not sure     Arthritis Sister     No Known Problems Brother     Other Son         pyloric stenosis / OR at 10 weeks age         A:  Administered the PHQ9 which indicates a self report of severe symptom distress. Pt would benefit from [facility] services to increase coping skills to provide symptom management/control/relief. PHQ Scores 9/2/2020 8/18/2020 8/12/2020 8/4/2020 7/21/2020 6/1/2020 2/23/2018   PHQ2 Score 5 5 5 4 4 6 4   PHQ9 Score 21 20 19 17 17 17 14     Interpretation of Total Score Depression Severity: 1-4 = Minimal depression, 5-9 = Mild depression, 10-14 = Moderate depression, 15-19 = Moderately severe depression, 20-27 = Severe depression      Diagnosis:    Persistent Depressive Disorder with Anxious Distress   R/O Borderline Personality Disorder        Diagnosis Date    Arthritis     spine & fingers    Asthma     since childhood    Chronic back pain     Chronic headaches     Depression     Depression     PONV (postoperative nausea and vomiting)     Postpartum depression 9/16/2017    Seizures ([address]            Plan:  Pt interventions: Motivational Interviewing to determine importance and readiness for change and Discussed potential barriers to change Trained in strategies for increasing balanced thinking, Discussed self-care (sleep, nutrition, rewarding activities, social support, exercise),  [place]-setting to identify pt's primary goals for [facility] visit / overall health, Supportive techniques, Emphasized self-care as important for managing overall health, Identified maladaptive thoughts and Problem-solving re: relationship problems;  Reviewed options for identifying appropriate providers      Pt Behavioral Change Plan:  Pt to contact [facility] regarding DBT by completing online contact request   Follow up in 2 weeks      Please note this report has been partially produced using speech recognition software  And may cause contain errors related to that system including grammar, punctuation and spelling as well as words and phrases that may seem inappropriate. If there are questions or concerns please feel free to contact me to clarify.

## 2020-09-11 ENCOUNTER — VIRTUAL VISIT (OUTPATIENT)
Dept: BEHAVIORAL/MENTAL HEALTH CLINIC | Age: 30
End: 2020-09-11
Payer: COMMERCIAL

## 2020-09-11 PROCEDURE — 90832 PSYTX W PT 30 MINUTES: CPT | Performed by: PSYCHOLOGIST

## 2020-09-11 ASSESSMENT — PATIENT HEALTH QUESTIONNAIRE - PHQ9
3. TROUBLE FALLING OR STAYING ASLEEP: 2
5. POOR APPETITE OR OVEREATING: 1
2. FEELING DOWN, DEPRESSED OR HOPELESS: 3
SUM OF ALL RESPONSES TO PHQ9 QUESTIONS 1 & 2: 4
10. IF YOU CHECKED OFF ANY PROBLEMS, HOW DIFFICULT HAVE THESE PROBLEMS MADE IT FOR YOU TO DO YOUR WORK, TAKE CARE OF THINGS AT HOME, OR GET ALONG WITH OTHER PEOPLE: 3
1. LITTLE INTEREST OR PLEASURE IN DOING THINGS: 1
9. THOUGHTS THAT YOU WOULD BE BETTER OFF DEAD, OR OF HURTING YOURSELF: 3
4. FEELING TIRED OR HAVING LITTLE ENERGY: 3
7. TROUBLE CONCENTRATING ON THINGS, SUCH AS READING THE NEWSPAPER OR WATCHING TELEVISION: 1
SUM OF ALL RESPONSES TO PHQ QUESTIONS 1-9: 19
8. MOVING OR SPEAKING SO SLOWLY THAT OTHER PEOPLE COULD HAVE NOTICED. OR THE OPPOSITE, BEING SO FIGETY OR RESTLESS THAT YOU HAVE BEEN MOVING AROUND A LOT MORE THAN USUAL: 2
SUM OF ALL RESPONSES TO PHQ QUESTIONS 1-9: 19
6. FEELING BAD ABOUT YOURSELF - OR THAT YOU ARE A FAILURE OR HAVE LET YOURSELF OR YOUR FAMILY DOWN: 3

## 2020-09-11 ASSESSMENT — COLUMBIA-SUICIDE SEVERITY RATING SCALE - C-SSRS
2. HAVE YOU ACTUALLY HAD ANY THOUGHTS OF KILLING YOURSELF?: NO
6. HAVE YOU EVER DONE ANYTHING, STARTED TO DO ANYTHING, OR PREPARED TO DO ANYTHING TO END YOUR LIFE?: NO
1. WITHIN THE PAST MONTH, HAVE YOU WISHED YOU WERE DEAD OR WISHED YOU COULD GO TO SLEEP AND NOT WAKE UP?: YES

## 2020-09-11 NOTE — PROGRESS NOTES
Non-medical: Not on file   Tobacco Use    Smoking status: Never Smoker    Smokeless tobacco: Never Used   Substance and Sexual Activity    Alcohol use: Yes     Alcohol/week: 0.0 standard drinks     Comment: rare social    Drug use: Yes     Types: Marijuana     Comment: vape daily    Sexual activity: Yes     Partners: Male   Lifestyle    Physical activity     Days per week: Not on file     Minutes per session: Not on file    Stress: Not on file   Relationships    Social connections     Talks on phone: Not on file     Gets together: Not on file     Attends Yarsanism service: Not on file     Active member of club or organization: Not on file     Attends meetings of clubs or organizations: Not on file     Relationship status: Not on file    Intimate partner violence     Fear of current or ex partner: Not on file     Emotionally abused: Not on file     Physically abused: Not on file     Forced sexual activity: Not on file   Other Topics Concern    Not on file   Social History Narrative    Not on file       TOBACCO:   reports that she has never smoked. She has never used smokeless tobacco.  ETOH:   reports current alcohol use. Family History:   Family History   Problem Relation Age of Onset    Depression Mother     Arthritis Mother         TKR    High Blood Pressure Father         No congenital heart problems, no aneurysms.  Diabetes Father         Charlett Ear might have had colon cancer she will update me.  Arthritis Maternal Grandmother     Cancer Maternal Grandmother         Unsure    Diabetes Maternal Grandmother     Arthritis Paternal Grandmother     Diabetes Paternal Grandmother     Stroke Paternal Grandmother     Cancer Paternal Grandfather         Colon? ? She's not sure     Arthritis Sister     No Known Problems Brother     Other Son         pyloric stenosis / OR at 10 weeks age         A:  Administered the PHQ9 which indicates a self report of moderately severe symptom distress. Pt would benefit from [facility] services to increase coping skills to provide symptom management/control/relief. PHQ Scores 9/11/2020 9/2/2020 8/18/2020 8/12/2020 8/4/2020 7/21/2020 6/1/2020   PHQ2 Score 4 5 5 5 4 4 6   PHQ9 Score 19 21 20 19 17 17 17     Interpretation of Total Score Depression Severity: 1-4 = Minimal depression, 5-9 = Mild depression, 10-14 = Moderate depression, 15-19 = Moderately severe depression, 20-27 = Severe depression      Diagnosis:    Persistent Depressive Disorder with Anxious Distress   R/O Borderline Personality Disorder      Diagnosis Date    Arthritis     spine & fingers    Asthma     since childhood    Chronic back pain     Chronic headaches     Depression     Depression     PONV (postoperative nausea and vomiting)     Postpartum depression 9/16/2017    Seizures ([address]            Plan:  Pt interventions: Motivational Interviewing to determine importance and readiness for change and Discussed potential barriers to change Trained in strategies for increasing balanced thinking, Discussed self-care (sleep, nutrition, rewarding activities, social support, exercise),  [place]-setting to identify pt's primary goals for [facility] visit / overall health, Supportive techniques, Emphasized self-care as important for managing overall health, Identified maladaptive thoughts and Problem-solving re: relationship problems; Reviewed options for identifying appropriate providers         Pt Behavioral Change Plan:  [facility]  Follow-up in 2 weeks      Please note this report has been partially produced using speech recognition software  And may cause contain errors related to that system including grammar, punctuation and spelling as well as words and phrases that may seem inappropriate. If there are questions or concerns please feel free to contact me to clarify.

## 2020-09-22 ENCOUNTER — VIRTUAL VISIT (OUTPATIENT)
Dept: BEHAVIORAL/MENTAL HEALTH CLINIC | Age: 30
End: 2020-09-22
Payer: COMMERCIAL

## 2020-09-22 PROCEDURE — 90832 PSYTX W PT 30 MINUTES: CPT | Performed by: PSYCHOLOGIST

## 2020-09-22 ASSESSMENT — PATIENT HEALTH QUESTIONNAIRE - PHQ9
1. LITTLE INTEREST OR PLEASURE IN DOING THINGS: 1
SUM OF ALL RESPONSES TO PHQ QUESTIONS 1-9: 17
7. TROUBLE CONCENTRATING ON THINGS, SUCH AS READING THE NEWSPAPER OR WATCHING TELEVISION: 2
9. THOUGHTS THAT YOU WOULD BE BETTER OFF DEAD, OR OF HURTING YOURSELF: 2
5. POOR APPETITE OR OVEREATING: 1
8. MOVING OR SPEAKING SO SLOWLY THAT OTHER PEOPLE COULD HAVE NOTICED. OR THE OPPOSITE, BEING SO FIGETY OR RESTLESS THAT YOU HAVE BEEN MOVING AROUND A LOT MORE THAN USUAL: 1
3. TROUBLE FALLING OR STAYING ASLEEP: 2
6. FEELING BAD ABOUT YOURSELF - OR THAT YOU ARE A FAILURE OR HAVE LET YOURSELF OR YOUR FAMILY DOWN: 3
2. FEELING DOWN, DEPRESSED OR HOPELESS: 3
SUM OF ALL RESPONSES TO PHQ QUESTIONS 1-9: 17
4. FEELING TIRED OR HAVING LITTLE ENERGY: 2
10. IF YOU CHECKED OFF ANY PROBLEMS, HOW DIFFICULT HAVE THESE PROBLEMS MADE IT FOR YOU TO DO YOUR WORK, TAKE CARE OF THINGS AT HOME, OR GET ALONG WITH OTHER PEOPLE: 3
SUM OF ALL RESPONSES TO PHQ9 QUESTIONS 1 & 2: 4

## 2020-09-22 NOTE — PROGRESS NOTES
Behavioral Health Consultation  Mi Gimenez Psy.D. Psychologist  9/22/20  3:06 PM EDT      Time spent with Patient: 30 minutes  This is patient's seventh  [facility] appointment. Reason for Consult:  depression  Referring Provider: Sebastien Cody MD      Feedback given to PCP. TELEHEALTH EVALUATION -- Audio and/or Visual (During YKLRF-45 public health emergency)    Due to COVID 19 outbreak, patient's office visit was converted to a virtual visit. Patient was contacted and agreed to proceed with a virtual visit via [employer]. Patient reports that they are located at home. Provider located at home office. The risks and benefits of converting to a virtual visit were discussed in light of the current infectious disease epidemic. Patient also understood that insurance coverage and co-pays are up to their individual insurance plans. Patient provides verbal consent for this visit to be billed to insurance. Pursuant to the emergency declaration under the [facility], [facility] waiver authority and the "[employer] and [employer] Act, this Virtual  Visit was conducted, with patient's consent, to reduce the patient's risk of exposure to COVID-19 and provide continuity of care for an established patient. Services were provided through an audio and video synchronous discussion virtually to substitute for in-person clinic visit. S:  Pt reports that she is very upset with her mom because the [facility] is mandating that she pay child support. Pt reports that she blames her mother for this because her mother filed for custody of her son; pt acknowledges that she did not attend the custody hearing because her mom had \"told lies about me. \"  Discussed pt negative self-evaluation related to her ambivalence about being a mother.   Pt shared that she feels her son is well cared for by her mother and her struggles with depression and anxiety make her less certain of her ability to parent him. Pt shared that today she received a text from her child's father's wife. She reports some anxiety related to this. Pt reports that she is now working 8-1pm and this has been very demanding. Pt shared about ambivalence related to ex-boyfriend. Discussed pros and cons of maintaining this connection including how it affects her mental health. She endorses some passive morbid ideation but specifically denies SI.    O:  MSE:    Appearance    alert, cooperative   Personal Hygiene : appropriately dressed, appropriately groomed and healthy looking  Appetite abnormal: inc  Sleep disturbance Yes, including: non-restful sleep  Fatigue Yes  Loss of pleasure Yes  Impulsive behavior No  Speech    spontaneous, normal rate and normal volume  Mood   depressed   Affect    depressed affect  Thought Content    intact  Thought Process    linear, goal directed and coherent  Associations    logical connections  Insight    poor  Judgment    fair  Orientation    oriented to person, place, time, and general circumstances  Memory    recent and remote memory intact  Attention/Concentration    intact  Morbid ideation No  Suicide Assessment    no suicidal ideation    History:    Medications:   Current Outpatient Medications   Medication Sig Dispense Refill    amitriptyline (ELAVIL) 25 MG tablet Take 1 tablet by mouth nightly 30 tablet 5    norgestimate-ethinyl estradiol (SPRINTEC 28) 0.25-35 MG-MCG per tablet 1 tablet by mouth daily please skip sugar pill 1 packet 14    VENTOLIN  (90 Base) MCG/ACT inhaler inhale 2 puffs by mouth four times a day if needed  0     No current facility-administered medications for this visit.         Social History:   Social History     Socioeconomic History    Marital status: Single     Spouse name: Not on file    Number of children: Not on file    Years of education: Not on file    Highest education level: Not on file   Occupational History    Not on file Social Needs    Financial resource strain: Not on file    Food insecurity     Worry: Not on file     Inability: Not on file    Transportation needs     Medical: Not on file     Non-medical: Not on file   Tobacco Use    Smoking status: Never Smoker    Smokeless tobacco: Never Used   Substance and Sexual Activity    Alcohol use: Yes     Alcohol/week: 0.0 standard drinks     Comment: rare social    Drug use: Yes     Types: Marijuana     Comment: vape daily    Sexual activity: Yes     Partners: Male   Lifestyle    Physical activity     Days per week: Not on file     Minutes per session: Not on file    Stress: Not on file   Relationships    Social connections     Talks on phone: Not on file     Gets together: Not on file     Attends Hinduism service: Not on file     Active member of club or organization: Not on file     Attends meetings of clubs or organizations: Not on file     Relationship status: Not on file    Intimate partner violence     Fear of current or ex partner: Not on file     Emotionally abused: Not on file     Physically abused: Not on file     Forced sexual activity: Not on file   Other Topics Concern    Not on file   Social History Narrative    Not on file       TOBACCO:   reports that she has never smoked. She has never used smokeless tobacco.  ETOH:   reports current alcohol use. Family History:   Family History   Problem Relation Age of Onset    Depression Mother     Arthritis Mother         TKR    High Blood Pressure Father         No congenital heart problems, no aneurysms.  Diabetes Father         Rafael Dutton might have had colon cancer she will update me.  Arthritis Maternal Grandmother     Cancer Maternal Grandmother         Unsure    Diabetes Maternal Grandmother     Arthritis Paternal Grandmother     Diabetes Paternal Grandmother     Stroke Paternal Grandmother     Cancer Paternal Grandfather         Colon? ? She's not sure     Arthritis Sister     No Known Problems Brother     Other Son         pyloric stenosis / OR at 10 weeks age         A:  Administered the PHQ9 which indicates a self report of moderately severe symptom distress. Pt would benefit from [facility] services to increase coping skills to provide symptom management/control/relief. PHQ Scores 9/22/2020 9/11/2020 9/2/2020 8/18/2020 8/12/2020 8/4/2020 7/21/2020   PHQ2 Score 4 4 5 5 5 4 4   PHQ9 Score 17 19 21 20 19 17 17     Interpretation of Total Score Depression Severity: 1-4 = Minimal depression, 5-9 = Mild depression, 10-14 = Moderate depression, 15-19 = Moderately severe depression, 20-27 = Severe depression      Diagnosis:  Persistent Depressive Disorder with Anxious Distress   R/O Borderline Personality Disorder      Plan:  Pt interventions: Motivational Interviewing to determine importance and readiness for change and Discussed potential barriers to change Trained in strategies for increasing balanced thinking, Discussed self-care (sleep, nutrition, rewarding activities, social support, exercise),  [place]-setting to identify pt's primary goals for [facility] visit / overall health, Supportive techniques, Emphasized self-care as important for managing overall health, Identified maladaptive thoughts and Problem-solving re: relationship problems; Reviewed options for identifying appropriate providers      Pt Behavioral Change Plan:  Follow up in 1 week      Please note this report has been partially produced using speech recognition software  And may cause contain errors related to that system including grammar, punctuation and spelling as well as words and phrases that may seem inappropriate. If there are questions or concerns please feel free to contact me to clarify.

## 2020-09-30 ENCOUNTER — OFFICE VISIT (OUTPATIENT)
Dept: FAMILY MEDICINE CLINIC | Age: 30
End: 2020-09-30
Payer: COMMERCIAL

## 2020-09-30 ENCOUNTER — TELEPHONE (OUTPATIENT)
Dept: FAMILY MEDICINE CLINIC | Age: 30
End: 2020-09-30

## 2020-09-30 VITALS
HEART RATE: 89 BPM | SYSTOLIC BLOOD PRESSURE: 126 MMHG | TEMPERATURE: 98 F | BODY MASS INDEX: 30.37 KG/M2 | RESPIRATION RATE: 15 BRPM | DIASTOLIC BLOOD PRESSURE: 90 MMHG | HEIGHT: 66 IN | WEIGHT: 189 LBS | OXYGEN SATURATION: 98 %

## 2020-09-30 PROCEDURE — G8417 CALC BMI ABV UP PARAM F/U: HCPCS | Performed by: INTERNAL MEDICINE

## 2020-09-30 PROCEDURE — 99214 OFFICE O/P EST MOD 30 MIN: CPT | Performed by: INTERNAL MEDICINE

## 2020-09-30 PROCEDURE — 1036F TOBACCO NON-USER: CPT | Performed by: INTERNAL MEDICINE

## 2020-09-30 PROCEDURE — G8427 DOCREV CUR MEDS BY ELIG CLIN: HCPCS | Performed by: INTERNAL MEDICINE

## 2020-09-30 RX ORDER — TIZANIDINE 2 MG/1
4 TABLET ORAL NIGHTLY
COMMUNITY

## 2020-09-30 RX ORDER — AMLODIPINE BESYLATE 2.5 MG/1
2.5 TABLET ORAL DAILY
Qty: 30 TABLET | Refills: 0 | Status: SHIPPED | OUTPATIENT
Start: 2020-09-30 | End: 2020-10-28

## 2020-09-30 RX ORDER — FLUTICASONE PROPIONATE 110 UG/1
2 AEROSOL, METERED RESPIRATORY (INHALATION) 2 TIMES DAILY
Qty: 1 INHALER | Refills: 2 | Status: SHIPPED | OUTPATIENT
Start: 2020-09-30 | End: 2020-12-21

## 2020-09-30 ASSESSMENT — ENCOUNTER SYMPTOMS
BACK PAIN: 0
EYE PAIN: 0
ABDOMINAL PAIN: 0
SHORTNESS OF BREATH: 0

## 2020-09-30 NOTE — PROGRESS NOTES
Subjective:      Patient ID: Nica Cuadra is a 26 y.o. female who presents today with:  Chief Complaint   Patient presents with    Follow-up       HPI    No SI/HI  Ventolin use is rare about once per week. Now it's like a few times a week. Not on long acting inhaler. Anxiety  Off medications due to side effects  Hypertension-Chronic, essential. Not compliant with low sodium diet. FeMale Sex. Compliant with therapy. Improved with   Past Medical History:   Diagnosis Date    Arthritis     spine & fingers    Asthma     since childhood    Chronic back pain     Chronic headaches     Depression     Depression     PONV (postoperative nausea and vomiting)     Postpartum depression 2017    Seizures ([address]      Past Surgical History:   Procedure Laterality Date    APPENDECTOMY  2016    CERVICAL LAMINECTOMY Left 2019    POSTERIOR FORAMINOTOMIES C 5-6, C 6-7 LEFT performed by April Ruth MD at [address] [place] [address] N/A 2017     SECTION performed by Jadon Nolasco MD at [facility]    LAPAROSCOPIC APPENDECTOMY  2016    Dr Mariajose Ross History     Socioeconomic History    Marital status: Single     Spouse name: Not on file    Number of children: Not on file    Years of education: Not on file    Highest education level: Not on file   Occupational History    Not on file   Social Needs    Financial resource strain: Not on file    Food insecurity     Worry: Not on file     Inability: Not on file    Transportation needs     Medical: Not on file     Non-medical: Not on file   Tobacco Use    Smoking status: Never Smoker    Smokeless tobacco: Never Used   Substance and Sexual Activity    Alcohol use:  Yes     Alcohol/week: 0.0 standard drinks     Comment: rare social    Drug use: Yes     Types: Marijuana     Comment: vape daily    Sexual activity: Yes     Partners: Male   Lifestyle    Physical activity     Days per week: Not on file immunocompromised state. Neurological: Negative for headaches. Psychiatric/Behavioral: Negative for hallucinations. The patient is nervous/anxious. Objective:   BP (!) 126/90   Pulse 89   Temp 98 °F (36.7 °C) (Oral)   Resp 15   Ht 5' 6\" (1.676 m)   Wt 189 lb (85.7 kg)   LMP 08/30/2020   SpO2 98%   BMI 30.51 kg/m²     Physical Exam  Constitutional:       General: She is not in acute distress. Appearance: She is well-developed. She is not ill-appearing, toxic-appearing or diaphoretic. HENT:      Head: Normocephalic. Eyes:      Pupils: Pupils are equal, round, and reactive to light. Neck:      Musculoskeletal: Neck supple. Vascular: No carotid bruit. Trachea: No tracheal deviation. Cardiovascular:      Rate and Rhythm: Normal rate and regular rhythm. Pulses: Normal pulses. Heart sounds: Normal heart sounds. No murmur. No friction rub. No gallop. Pulmonary:      Effort: Pulmonary effort is normal. No respiratory distress. Breath sounds: Normal breath sounds. No wheezing or rales. Abdominal:      General: Bowel sounds are normal. There is no distension. Palpations: Abdomen is soft. Tenderness: There is no abdominal tenderness. There is no right CVA tenderness, left CVA tenderness, guarding or rebound. Musculoskeletal:      Right lower leg: No edema. Left lower leg: No edema. Skin:     General: Skin is warm and dry. Findings: No erythema or rash. Neurological:      Mental Status: She is oriented to person, place, and time. Assessment:       Diagnosis Orders   1. Anxiety     2. Essential hypertension     3.  Moderate persistent asthma without complication           Plan:    vc    We discussed trying more pharmacotherapy she doesn't want to try anything else given history of failing multiple agents  She will call asap   Will d/w with dr Cass Corley, but likely speciality mental health  Mom's mom had cancer but she doesn't know what type (she will updte me)  Wash mouth out after each use, risk of thrush or pneumonia  Start flovent  Add low dose amlodipine  Seeing Dr. Frankie Velez in august, no changes made. (fibromyalgia)  Flu shot call in 2 weeks or go to pharmacy   pap test 2019 through Dr. Filemon Abid, call if you stop seeing him. (06/2020)   Refused pneumovax 23. No orders of the defined types were placed in this encounter. Orders Placed This Encounter   Medications    fluticasone (FLOVENT HFA) 110 MCG/ACT inhaler     Sig: Inhale 2 puffs into the lungs 2 times daily     Dispense:  1 Inhaler     Refill:  2    amLODIPine (NORVASC) 2.5 MG tablet     Sig: Take 1 tablet by mouth daily     Dispense:  30 tablet     Refill:  0    VENTOLIN  (90 Base) MCG/ACT inhaler     Sig: inhale 1 puffs by mouth EVERY 6 HOURS AS NEEDED FOR WHEEZING OR SHORTNESS OF BREATH     Dispense:  3 Inhaler     Refill:  3   MA visit in 5 to 7 days for bp check. Call in 2 to 3 days if you don't hear from me after the bp check. Patient was offered pregnancy test, she declined, she understands risks of birth defects. If anything should change or worsen call ASAP, don't wait for next scheduled appointment. Return in about 6 weeks (around 11/11/2020) for Chronic condition management/appointment, worsening symptoms, call ASAP for appointment.       Roosevelt Stephens MD

## 2020-10-01 ENCOUNTER — TELEPHONE (OUTPATIENT)
Dept: FAMILY MEDICINE CLINIC | Age: 30
End: 2020-10-01

## 2020-10-01 NOTE — PATIENT INSTRUCTIONS
Patient Education        Asthma in Adults: Care Instructions  Your Care Instructions     During an asthma attack, your airways swell and narrow as a reaction to certain things (triggers). This makes it hard to breathe. You may be able to prevent asthma attacks if you avoid the things that set off your asthma symptoms. Keeping your asthma under control and treating symptoms before they get bad can help you avoid severe attacks. If you can control your asthma, you may be able to do all of your normal daily activities. You may also avoid asthma attacks and trips to the hospital.  Follow-up care is a key part of your treatment and safety. Be sure to make and go to all appointments, and call your doctor if you are having problems. It's also a good idea to know your test results and keep a list of the medicines you take. How can you care for yourself at home? · Follow your asthma action plan so you can manage your symptoms at home. An asthma action plan will help you prevent and control airway reactions and will tell you what to do during an asthma attack. If you do not have an asthma action plan, work with your doctor to build one. · Take your asthma medicine exactly as prescribed. Medicine plays an important role in controlling asthma. Talk to your doctor right away if you have any questions about what to take and how to take it. ? Use your quick-relief medicine when you have symptoms of an attack. Quick-relief medicine often is an albuterol inhaler. Some people need to use quick-relief medicine before they exercise. ? Take your controller medicine every day, not just when you have symptoms. Controller medicine is usually an inhaled corticosteroid. The goal is to prevent problems before they occur. Do not use your controller medicine to try to treat an attack that has already started. It does not work fast enough to help.   ? If your doctor prescribed corticosteroid pills to use during an attack, take them as flu, wash your hands often. When should you call for help? [id number] anytime you think you may need emergency care. For example, call if:  · You have severe trouble breathing. Call your doctor now or seek immediate medical care if:  · Your symptoms do not get better after you have followed your asthma action plan. · You cough up yellow, dark brown, or bloody mucus (sputum). Watch closely for changes in your health, and be sure to contact your doctor if:  · Your coughing and wheezing get worse. · You need to use quick-relief medicine on more than 2 days a week (unless it is just for exercise). · You need help figuring out what is triggering your asthma attacks. Where can you learn more? Go to https://[employer]WellRight. org and sign in to your [employer] account. Enter P597 in the [employer] box to learn more about \"Asthma in Adults: Care Instructions. \"     If you do not have an account, please click on the \"Sign Up Now\" link. Current as of: February 24, 2020               Content Version: 12.5  © 0247-1783 Healthwise, Incorporated. Care instructions adapted under license by [facility] ([facility]). If you have questions about a medical condition or this instruction, always ask your healthcare professional. [address] any warranty or liability for your use of this information.

## 2020-10-01 NOTE — TELEPHONE ENCOUNTER
Please relay this message to patient about contacting speciality mental health    She can call to schedule  Let us know if any issues      [facility], [address], [address]   Phone: [phone]  Toll Free: [phone]   (additional offices in [facility], [address]   Services: counseling and testing (including for learning disabilities)     [facility] Psych   [address]   [facility], [address]   [phone]   (additional locations in [facility])   Services: Counseling with additional specialized programs in eating disorders, anger management for domestic violence, child custody assessment and divorce conciliation assessment     [facility]   [address] [facility], [address] [facility] [facility] [place]   Phone [phone]   Toll-free [address]  [address], [address]   Phone [phone] [place] [phone]   Services: Counseling, psychiatry, testing, IOP programs for adults, addition services)

## 2020-10-06 ENCOUNTER — OFFICE VISIT (OUTPATIENT)
Dept: OBGYN CLINIC | Age: 30
End: 2020-10-06
Payer: COMMERCIAL

## 2020-10-06 VITALS
WEIGHT: 186 LBS | HEART RATE: 92 BPM | BODY MASS INDEX: 29.89 KG/M2 | DIASTOLIC BLOOD PRESSURE: 88 MMHG | SYSTOLIC BLOOD PRESSURE: 134 MMHG | HEIGHT: 66 IN

## 2020-10-06 PROCEDURE — G8484 FLU IMMUNIZE NO ADMIN: HCPCS | Performed by: OBSTETRICS & GYNECOLOGY

## 2020-10-06 PROCEDURE — 99395 PREV VISIT EST AGE 18-39: CPT | Performed by: OBSTETRICS & GYNECOLOGY

## 2020-10-06 NOTE — Clinical Note
Patient recently diagnosed with hypertension, placed on Norvasc 2.5 mg, patient not consistent with taking her medication. I found out that the patient is using oral contraceptive pills which could be the cause, or major contributor to her abnormal blood pressure measurements. I advised the patient to stop the oral contraceptive pills and counseled her about a different method of birth control. Also advised the patient follow-up with you in 4 to 5 weeks for blood pressure check after stopping her birth control pills. Patient is not taking her Norvasc at this time either. Thank you.

## 2020-10-06 NOTE — LETTER
[facility]  [address] [place] [address]  Phone: [phone]  Fax: [phone]    Willem Rojas MD        October 16, 2020    [address] [place] [address]      Dear Denilson Richards:    The results of your most recent Pap smear are normal. This means that no cancerous or precancerous cells were seen. We recommend that you come back in 1 year for your next routine Pap smear. If you have any questions or concerns, please don't hesitate to call.     Sincerely,        Willem Rojas MD

## 2020-10-06 NOTE — PROGRESS NOTES
Subjective: Lilibeth Naidu is a 26 y. o.female [id number] here for routine exam. Current Complaints: no complaints . Menstrual history:   Absent menses , on continous OCPs  Sexual activity:  no, denies knowledge of risky exposure  Abnormal vaginaldischarge:  No  Contraceptive method:  none    Vitals:  /88 (Site: Right Upper Arm, Position: Sitting, Cuff Size: Medium Adult)   Pulse 92   Ht 5' 6\" (1.676 m)   Wt 186 lb (84.4 kg)   BMI 30.02 kg/m²   Allergies:  Citalopram; Effexor [venlafaxine]; Fluvoxamine; Geodon [ziprasidone hcl]; Metronidazole; Seasonal; Sertraline; Trintellix [vortioxetine]; Vilazodone; Wellbutrin [bupropion]; and Onion     Past Medical History:   Diagnosis Date    Arthritis     spine & fingers    Asthma     since childhood    Chronic back pain     Chronic headaches     Depression     Depression     PONV (postoperative nausea and vomiting)     Postpartum depression 2017    Seizures ([facility] [address]      Past Surgical History:   Procedure Laterality Date    APPENDECTOMY  2016    CERVICAL LAMINECTOMY Left 2019    POSTERIOR FORAMINOTOMIES C 5-6, C 6-7 LEFT performed by Vasile Wnag MD at [address] [place] [address] N/A 2017     SECTION performed by Gavi Martinez MD at [facility]    LAPAROSCOPIC APPENDECTOMY  2016    Dr Abram Garcia History   Problem Relation Age of Onset    Depression Mother     Arthritis Mother         TKR    High Blood Pressure Father         No congenital heart problems, no aneurysms.  Diabetes Father         Herlinda Memerick might have had colon cancer she will update me.  Arthritis Maternal Grandmother     Cancer Maternal Grandmother         Unsure    Diabetes Maternal Grandmother     Arthritis Paternal Grandmother     Diabetes Paternal Grandmother     Stroke Paternal Grandmother     Cancer Paternal Grandfather         Colon? ? She's not sure     Arthritis Sister     No Known Problems Brother     Other Son         pyloric stenosis / OR at 10 weeks age     Social History     Socioeconomic History    Marital status: Single     Spouse name: Not on file    Number of children: Not on file    Years of education: Not on file    Highest education level: Not on file   Occupational History    Not on file   Social Needs    Financial resource strain: Not on file    Food insecurity     Worry: Not on file     Inability: Not on file    Transportation needs     Medical: Not on file     Non-medical: Not on file   Tobacco Use    Smoking status: Never Smoker    Smokeless tobacco: Never Used   Substance and Sexual Activity    Alcohol use: Yes     Alcohol/week: 0.0 standard drinks     Comment: rare social    Drug use: Yes     Types: Marijuana     Comment: vape daily    Sexual activity: Yes     Partners: Male   Lifestyle    Physical activity     Days per week: Not on file     Minutes per session: Not on file    Stress: Not on file   Relationships    Social connections     Talks on phone: Not on file     Gets together: Not on file     Attends Congregational service: Not on file     Active member of club or organization: Not on file     Attends meetings of clubs or organizations: Not on file     Relationship status: Not on file    Intimate partner violence     Fear of current or ex partner: Not on file     Emotionally abused: Not on file     Physically abused: Not on file     Forced sexual activity: Not on file   Other Topics Concern    Not on file   Social History Narrative    Not on file       Gynecologic History  No LMP recorded.   Last Pap: 2 years  Results: normal  Last Mammogram: Not Indicated Results: N/A  FH breast cancer : negative ,     OB History        3    Para   1    Term   1       0    AB   2    Living   1       SAB   2    TAB   0    Ectopic   0    Molar        Multiple   0    Live Births   1              Patient's medications, allergies, past medical, surgical, social and N.gonorrhoeae DNA    Trichomonas Screen   2. Screening for HPV (human papillomavirus)  PAP SMEAR   3. Screening for STD (sexually transmitted disease)  C.trachomatis N.gonorrhoeae DNA    Trichomonas Screen   4. Essential hypertension         Body mass index is 30.02 kg/m². Obesity:  Class I  Smoking:  No    Plan:   Pap smear : indicated:  performed. Breast exam : Normal  STD work up : Indicated  Treatment for yeast and BV given , based on exam as well as symptoms. Follows with Dr. Marcelina Gordon, diagnosed with hypertension and was started on Norvasc 2.5 mg p.o. daily, which patient is not consistently taking. I did advise the patient to stop the oral contraceptive pills because 1 of the possible side effects from taking the pills could be the hypertension the patient was diagnosed with and being treated for. I advised the to follow-up with Dr. Gely Ferguson in 4 to 5 weeks for blood pressure check after stopping the birth control pill. Patient was counseled about different kinds of birth control pills and the Mirena IUD which could be the safest option due to her above history from using systemic hormonal methods. Mirena IUD ordered patient to come in for placement. Obesity Counseling:  Given  Smoking Counseling:  N/A  STD counseling: Discussed safe sexual practice in detail    Orders Placed This Encounter   Procedures    C.trachomatis N.gonorrhoeae DNA     Standing Status:   Future     Standing Expiration Date:   10/6/2021    Trichomonas Screen     Standing Status:   Future     Standing Expiration Date:   10/6/2021    PAP SMEAR     Standing Status:   Future     Standing Expiration Date:   10/6/2021     Order Specific Question:   Collection Type     Answer: Thin Prep     Order Specific Question:   Prior Abnormal Pap Test     Answer:   No     Order Specific Question:   Screening or Diagnostic     Answer:   Screening     Order Specific Question:   HPV Requested?      Answer:   Yes     Order Specific Question:   High Risk Patient     Answer:   N/A       No orders of the defined types were placed in this encounter. Follow up:  No follow-ups on file.       Willem Rojas MD

## 2020-10-14 ENCOUNTER — VIRTUAL VISIT (OUTPATIENT)
Dept: BEHAVIORAL/MENTAL HEALTH CLINIC | Age: 30
End: 2020-10-14
Payer: COMMERCIAL

## 2020-10-14 PROCEDURE — 90832 PSYTX W PT 30 MINUTES: CPT | Performed by: PSYCHOLOGIST

## 2020-10-14 ASSESSMENT — PATIENT HEALTH QUESTIONNAIRE - PHQ9
6. FEELING BAD ABOUT YOURSELF - OR THAT YOU ARE A FAILURE OR HAVE LET YOURSELF OR YOUR FAMILY DOWN: 3
1. LITTLE INTEREST OR PLEASURE IN DOING THINGS: 1
SUM OF ALL RESPONSES TO PHQ QUESTIONS 1-9: 17
2. FEELING DOWN, DEPRESSED OR HOPELESS: 3
9. THOUGHTS THAT YOU WOULD BE BETTER OFF DEAD, OR OF HURTING YOURSELF: 3
3. TROUBLE FALLING OR STAYING ASLEEP: 2
10. IF YOU CHECKED OFF ANY PROBLEMS, HOW DIFFICULT HAVE THESE PROBLEMS MADE IT FOR YOU TO DO YOUR WORK, TAKE CARE OF THINGS AT HOME, OR GET ALONG WITH OTHER PEOPLE: 2
SUM OF ALL RESPONSES TO PHQ QUESTIONS 1-9: 20
7. TROUBLE CONCENTRATING ON THINGS, SUCH AS READING THE NEWSPAPER OR WATCHING TELEVISION: 1
SUM OF ALL RESPONSES TO PHQ QUESTIONS 1-9: 20
SUM OF ALL RESPONSES TO PHQ9 QUESTIONS 1 & 2: 4
8. MOVING OR SPEAKING SO SLOWLY THAT OTHER PEOPLE COULD HAVE NOTICED. OR THE OPPOSITE, BEING SO FIGETY OR RESTLESS THAT YOU HAVE BEEN MOVING AROUND A LOT MORE THAN USUAL: 2
4. FEELING TIRED OR HAVING LITTLE ENERGY: 2
5. POOR APPETITE OR OVEREATING: 3

## 2020-10-14 ASSESSMENT — COLUMBIA-SUICIDE SEVERITY RATING SCALE - C-SSRS
6. HAVE YOU EVER DONE ANYTHING, STARTED TO DO ANYTHING, OR PREPARED TO DO ANYTHING TO END YOUR LIFE?: YES
7. DID THIS OCCUR IN THE LAST THREE MONTHS: NO
2. HAVE YOU ACTUALLY HAD ANY THOUGHTS OF KILLING YOURSELF?: NO
1. WITHIN THE PAST MONTH, HAVE YOU WISHED YOU WERE DEAD OR WISHED YOU COULD GO TO SLEEP AND NOT WAKE UP?: YES
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
## 2020-10-14 NOTE — Clinical Note
Patient remains somewhat reluctant to transition to specialty mental health but will consider. Reviewed options with her.

## 2020-10-15 LAB
CHLAMYDIA TRACHOMATIS AMPLIFIED DET: NEGATIVE
N GONORRHOEAE AMPLIFIED DET: NEGATIVE
PAP SMEAR: NORMAL

## 2020-10-19 ENCOUNTER — TELEPHONE (OUTPATIENT)
Dept: OBGYN CLINIC | Age: 30
End: 2020-10-19

## 2020-10-23 ENCOUNTER — VIRTUAL VISIT (OUTPATIENT)
Dept: BEHAVIORAL/MENTAL HEALTH CLINIC | Age: 30
End: 2020-10-23
Payer: COMMERCIAL

## 2020-10-23 PROCEDURE — 90832 PSYTX W PT 30 MINUTES: CPT | Performed by: PSYCHOLOGIST

## 2020-10-23 ASSESSMENT — PATIENT HEALTH QUESTIONNAIRE - PHQ9
SUM OF ALL RESPONSES TO PHQ QUESTIONS 1-9: 18
9. THOUGHTS THAT YOU WOULD BE BETTER OFF DEAD, OR OF HURTING YOURSELF: 2
SUM OF ALL RESPONSES TO PHQ QUESTIONS 1-9: 18
8. MOVING OR SPEAKING SO SLOWLY THAT OTHER PEOPLE COULD HAVE NOTICED. OR THE OPPOSITE, BEING SO FIGETY OR RESTLESS THAT YOU HAVE BEEN MOVING AROUND A LOT MORE THAN USUAL: 1
SUM OF ALL RESPONSES TO PHQ QUESTIONS 1-9: 16
10. IF YOU CHECKED OFF ANY PROBLEMS, HOW DIFFICULT HAVE THESE PROBLEMS MADE IT FOR YOU TO DO YOUR WORK, TAKE CARE OF THINGS AT HOME, OR GET ALONG WITH OTHER PEOPLE: 2
5. POOR APPETITE OR OVEREATING: 2
3. TROUBLE FALLING OR STAYING ASLEEP: 2
2. FEELING DOWN, DEPRESSED OR HOPELESS: 3
7. TROUBLE CONCENTRATING ON THINGS, SUCH AS READING THE NEWSPAPER OR WATCHING TELEVISION: 1
1. LITTLE INTEREST OR PLEASURE IN DOING THINGS: 1
4. FEELING TIRED OR HAVING LITTLE ENERGY: 3
6. FEELING BAD ABOUT YOURSELF - OR THAT YOU ARE A FAILURE OR HAVE LET YOURSELF OR YOUR FAMILY DOWN: 3
SUM OF ALL RESPONSES TO PHQ9 QUESTIONS 1 & 2: 4

## 2020-10-23 NOTE — PROGRESS NOTES
Behavioral Health Consultation  Kaylan Shaw Psy.D. Psychologist  10/23/20  2:33 PM EDT      Time spent with Patient: 25 minutes  This is patient's ninth  [facility] appointment. Reason for Consult:  depression  Referring Provider: Kevin Wing MD      Feedback given to PCP. TELEHEALTH EVALUATION -- Audio and/or Visual (During YXEAX-14 public health emergency)    Due to COVID 19 outbreak, patient's office visit was converted to a virtual visit. Patient was contacted and agreed to proceed with a virtual visit via [employer]. Patient reports that they are located at home. Provider located at home office. The risks and benefits of converting to a virtual visit were discussed in light of the current infectious disease epidemic. Patient also understood that insurance coverage and co-pays are up to their individual insurance plans. Patient provides verbal consent for this visit to be billed to insurance. Pursuant to the emergency declaration under the [facility], [facility] waiver authority and the [employer] Act, this Virtual  Visit was conducted, with patient's consent, to reduce the patient's risk of exposure to COVID-19 and provide continuity of care for an established patient. Services were provided through an audio and video synchronous discussion virtually to substitute for in-person clinic visit. S:  Primary concern for today's visit: Depression    Interim Functioning:  Pt reports that things have been about the same    Pt reports that she continues to struggle with anhedonia and depressed mood. Reports feeling frustrated with work due to frequent schedule changes. Reports that her son is not going to visit her father after all, which she is relieved about.   Discussed some strain in relationship with her mother and their recent interactions and pt's frustration that her step-father comes to the house to pick Intrauterine Once Aj Elena MD   1 each at 10/27/20 2603       Social History:   Social History     Socioeconomic History    Marital status: Single     Spouse name: Not on file    Number of children: Not on file    Years of education: Not on file    Highest education level: Not on file   Occupational History    Not on file   Social Needs    Financial resource strain: Not on file    Food insecurity     Worry: Not on file     Inability: Not on file   [employer] needs     Medical: Not on file     Non-medical: Not on file   Tobacco Use    Smoking status: Never Smoker    Smokeless tobacco: Never Used   Substance and Sexual Activity    Alcohol use: Yes     Alcohol/week: 0.0 standard drinks     Comment: rare social    Drug use: Yes     Types: Marijuana     Comment: vape daily    Sexual activity: Yes     Partners: Male   Lifestyle    Physical activity     Days per week: Not on file     Minutes per session: Not on file    Stress: Not on file   Relationships    Social connections     Talks on phone: Not on file     Gets together: Not on file     Attends Zoroastrian service: Not on file     Active member of club or organization: Not on file     Attends meetings of clubs or organizations: Not on file     Relationship status: Not on file    Intimate partner violence     Fear of current or ex partner: Not on file     Emotionally abused: Not on file     Physically abused: Not on file     Forced sexual activity: Not on file   Other Topics Concern    Not on file   Social History Narrative    Not on file       TOBACCO:   reports that she has never smoked. She has never used smokeless tobacco.  ETOH:   reports current alcohol use. Family History:   Family History   Problem Relation Age of Onset    Depression Mother     Arthritis Mother         TKR    High Blood Pressure Father         No congenital heart problems, no aneurysms.      Diabetes Father         Ginger Magana might have had colon cancer she will update me.  Arthritis Maternal Grandmother     Cancer Maternal Grandmother         Unsure    Diabetes Maternal Grandmother     Arthritis Paternal Grandmother     Diabetes Paternal Grandmother     Stroke Paternal Grandmother     Cancer Paternal Grandfather         Colon? ? She's not sure     Arthritis Sister     No Known Problems Brother     Other Son         pyloric stenosis / OR at 10 weeks age         A:  Administered the PHQ9 which indicates a self report of moderately severe symptom distress. Pt would benefit from [facility] services to increase coping skills to provide symptom management/control/relief. Had discussed specialty mental health, but pt remains hesitant to pursue this at this time; she does note some barrier with health insurance for IOP but also some anxiety about initiating care.         PHQ Scores 10/23/2020 10/14/2020 9/22/2020 9/11/2020 9/2/2020 8/18/2020 8/12/2020   PHQ2 Score 4 4 4 4 5 5 5   PHQ9 Score 18 20 17 19 21 20 19     Interpretation of Total Score Depression Severity: 1-4 = Minimal depression, 5-9 = Mild depression, 10-14 = Moderate depression, 15-19 = Moderately severe depression, 20-27 = Severe depression      Diagnosis:    Persistent Depressive Disorder with Anxious Distress      Diagnosis Date    Arthritis     spine & fingers    Asthma     since childhood    Chronic back pain     Chronic headaches     Depression     Depression     PONV (postoperative nausea and vomiting)     Postpartum depression 9/16/2017    Seizures ([facility] [address]            Plan:  Pt interventions:  Discussed self-care (sleep, nutrition, rewarding activities, social support, exercise), Motivational Interviewing to determine importance and readiness for change, Discussed potential barriers to change, [place]-setting to identify pt's primary goals for [facility] visit / overall health, Supportive techniques, Emphasized self-care as important for managing overall health, CBT to target negative thought patterns and Identified maladaptive thoughts      Pt Behavioral Change Plan:  Follow up in 2 weeks      Please note this report has been partially produced using speech recognition software  And may cause contain errors related to that system including grammar, punctuation and spelling as well as words and phrases that may seem inappropriate. If there are questions or concerns please feel free to contact me to clarify.

## 2020-10-27 ENCOUNTER — OFFICE VISIT (OUTPATIENT)
Dept: OBGYN CLINIC | Age: 30
End: 2020-10-27
Payer: COMMERCIAL

## 2020-10-27 VITALS
DIASTOLIC BLOOD PRESSURE: 82 MMHG | WEIGHT: 187 LBS | SYSTOLIC BLOOD PRESSURE: 138 MMHG | HEIGHT: 66 IN | BODY MASS INDEX: 30.05 KG/M2 | HEART RATE: 92 BPM

## 2020-10-27 LAB
HCG, URINE, POC: NEGATIVE
Lab: NORMAL
NEGATIVE QC PASS/FAIL: NORMAL
POSITIVE QC PASS/FAIL: NORMAL

## 2020-10-27 PROCEDURE — 81025 URINE PREGNANCY TEST: CPT | Performed by: OBSTETRICS & GYNECOLOGY

## 2020-10-27 PROCEDURE — 58300 INSERT INTRAUTERINE DEVICE: CPT | Performed by: OBSTETRICS & GYNECOLOGY

## 2020-10-28 RX ORDER — AMLODIPINE BESYLATE 2.5 MG/1
TABLET ORAL
Qty: 14 TABLET | Refills: 0 | Status: SHIPPED | OUTPATIENT
Start: 2020-10-28 | End: 2020-10-30 | Stop reason: SDUPTHER

## 2020-10-30 ENCOUNTER — TELEPHONE (OUTPATIENT)
Dept: FAMILY MEDICINE CLINIC | Age: 30
End: 2020-10-30

## 2020-10-30 ENCOUNTER — NURSE ONLY (OUTPATIENT)
Dept: FAMILY MEDICINE CLINIC | Age: 30
End: 2020-10-30

## 2020-10-30 VITALS — DIASTOLIC BLOOD PRESSURE: 84 MMHG | SYSTOLIC BLOOD PRESSURE: 132 MMHG

## 2020-10-30 RX ORDER — AMLODIPINE BESYLATE 2.5 MG/1
TABLET ORAL
Qty: 90 TABLET | Refills: 3 | Status: SHIPPED | OUTPATIENT
Start: 2020-10-30 | End: 2020-11-11 | Stop reason: CLARIF

## 2020-11-04 ENCOUNTER — VIRTUAL VISIT (OUTPATIENT)
Dept: BEHAVIORAL/MENTAL HEALTH CLINIC | Age: 30
End: 2020-11-04
Payer: COMMERCIAL

## 2020-11-04 PROCEDURE — 90832 PSYTX W PT 30 MINUTES: CPT | Performed by: PSYCHOLOGIST

## 2020-11-04 ASSESSMENT — PATIENT HEALTH QUESTIONNAIRE - PHQ9
6. FEELING BAD ABOUT YOURSELF - OR THAT YOU ARE A FAILURE OR HAVE LET YOURSELF OR YOUR FAMILY DOWN: 2
SUM OF ALL RESPONSES TO PHQ9 QUESTIONS 1 & 2: 4
9. THOUGHTS THAT YOU WOULD BE BETTER OFF DEAD, OR OF HURTING YOURSELF: 2
2. FEELING DOWN, DEPRESSED OR HOPELESS: 3
5. POOR APPETITE OR OVEREATING: 3
1. LITTLE INTEREST OR PLEASURE IN DOING THINGS: 1
SUM OF ALL RESPONSES TO PHQ QUESTIONS 1-9: 19
4. FEELING TIRED OR HAVING LITTLE ENERGY: 2
10. IF YOU CHECKED OFF ANY PROBLEMS, HOW DIFFICULT HAVE THESE PROBLEMS MADE IT FOR YOU TO DO YOUR WORK, TAKE CARE OF THINGS AT HOME, OR GET ALONG WITH OTHER PEOPLE: 2
7. TROUBLE CONCENTRATING ON THINGS, SUCH AS READING THE NEWSPAPER OR WATCHING TELEVISION: 3
3. TROUBLE FALLING OR STAYING ASLEEP: 2
8. MOVING OR SPEAKING SO SLOWLY THAT OTHER PEOPLE COULD HAVE NOTICED. OR THE OPPOSITE, BEING SO FIGETY OR RESTLESS THAT YOU HAVE BEEN MOVING AROUND A LOT MORE THAN USUAL: 1
SUM OF ALL RESPONSES TO PHQ QUESTIONS 1-9: 17
SUM OF ALL RESPONSES TO PHQ QUESTIONS 1-9: 19

## 2020-11-04 NOTE — PROGRESS NOTES
Behavioral Health Consultation  Candy Hitchcock Psy.D. Psychologist  11/4/20  3:06 PM EST      Time spent with Patient: 23 minutes  This is patient's tenth  [facility] appointment. Reason for Consult:  depression  Referring Provider: Selina Chapin MD      Feedback given to PCP. TELEHEALTH EVALUATION -- Audio and/or Visual (During JYRNV-69 public health emergency)    Due to COVID 19 outbreak, patient's office visit was converted to a virtual visit. Patient was contacted and agreed to proceed with a virtual visit via [employer]. Patient reports that they are located at home. Provider located at home office. The risks and benefits of converting to a virtual visit were discussed in light of the current infectious disease epidemic. Patient also understood that insurance coverage and co-pays are up to their individual insurance plans. Patient provides verbal consent for this visit to be billed to insurance. Pursuant to the emergency declaration under the [facility] waiver authority and the [facility] and [employer] Act, this Virtual  Visit was conducted, with patient's consent, to reduce the patient's risk of exposure to COVID-19 and provide continuity of care for an established patient. Services were provided through an audio and video synchronous discussion virtually to substitute for in-person clinic visit.       S:  Pt reports things have been \"ok\"  Reports low mood daily, fatigue, appetite disturbance, trouble concentrating, and feeling bad about herself  Reports that work has been a bit \"crazy\"  Notes that today is the 4-year anniversary of her friend's death  She reports that her dreams have not been as \"significant or vivid\"   Pt states that her son has been having some behavioral concerns; discussed these and ways of responding   Reports ongoing sense of loneliness and frustration in some of her interpersonal relationships.  Discussed options for increased socialization  Denies SI      O:  MSE:  Appearance    alert, cooperative   Personal Hygiene : appropriately dressed, appropriately groomed and healthy looking  Appetite abnormal: inc  Sleep disturbance Yes, including: non-restful sleep  Fatigue Yes  Loss of pleasure Yes  Impulsive behavior No  Speech    spontaneous, normal rate and normal volume  Mood   depressed   Affect    depressed affect  Thought Content    intact  Thought Process    linear, goal directed and coherent  Associations    logical connections  Insight   fair  Judgment   good  Orientation    oriented to person, place, time, and general circumstances  Memory    recent and remote memory intact  Attention/Concentration    intact  Morbid ideation No  Suicide Assessment    no suicidal ideation            History:    Medications:   Current Outpatient Medications   Medication Sig Dispense Refill    amLODIPine (NORVASC) 2.5 MG tablet take 1 tablet by mouth once daily 90 tablet 3    tiZANidine (ZANAFLEX) 2 MG tablet Take 2 mg by mouth every 6 hours as needed      fluticasone (FLOVENT HFA) 110 MCG/ACT inhaler Inhale 2 puffs into the lungs 2 times daily 1 Inhaler 2    VENTOLIN  (90 Base) MCG/ACT inhaler inhale 1 puffs by mouth EVERY 6 HOURS AS NEEDED FOR WHEEZING OR SHORTNESS OF BREATH 3 Inhaler 3     Current Facility-Administered Medications   Medication Dose Route Frequency Provider Last Rate Last Dose    levonorgestrel (MIRENA) IUD 52 mg 1 each  1 each Intrauterine Once Clemente Seaman MD   1 each at 10/27/20 4774       Social History:   Social History     Socioeconomic History    Marital status: Single     Spouse name: Not on file    Number of children: Not on file    Years of education: Not on file    Highest education level: Not on file   Occupational History    Not on file   Social Needs    Financial resource strain: Not on file    Food insecurity     Worry: Not on file     Inability: Not on file   [employer] needs     Medical: Not on file     Non-medical: Not on file   Tobacco Use    Smoking status: Never Smoker    Smokeless tobacco: Never Used   Substance and Sexual Activity    Alcohol use: Yes     Alcohol/week: 0.0 standard drinks     Comment: rare social    Drug use: Yes     Types: Marijuana     Comment: vape daily    Sexual activity: Yes     Partners: Male   Lifestyle    Physical activity     Days per week: Not on file     Minutes per session: Not on file    Stress: Not on file   Relationships    Social connections     Talks on phone: Not on file     Gets together: Not on file     Attends Anglican service: Not on file     Active member of club or organization: Not on file     Attends meetings of clubs or organizations: Not on file     Relationship status: Not on file    Intimate partner violence     Fear of current or ex partner: Not on file     Emotionally abused: Not on file     Physically abused: Not on file     Forced sexual activity: Not on file   Other Topics Concern    Not on file   Social History Narrative    Not on file       TOBACCO:   reports that she has never smoked. She has never used smokeless tobacco.  ETOH:   reports current alcohol use. Family History:   Family History   Problem Relation Age of Onset    Depression Mother     Arthritis Mother         TKR    High Blood Pressure Father         No congenital heart problems, no aneurysms.  Diabetes Father         Hira Sutherland might have had colon cancer she will update me.  Arthritis Maternal Grandmother     Cancer Maternal Grandmother         Unsure    Diabetes Maternal Grandmother     Arthritis Paternal Grandmother     Diabetes Paternal Grandmother     Stroke Paternal Grandmother     Cancer Paternal Grandfather         Colon? ? She's not sure     Arthritis Sister     No Known Problems Brother     Other Son         pyloric stenosis / OR at 10 weeks age         A:  Administered the PHQ9 which indicates a self report of moderately severe symptom distress. Pt would benefit from [facility] services to increase coping skills to provide symptom management/control/relief. PHQ Scores 11/4/2020 10/23/2020 10/14/2020 9/22/2020 9/11/2020 9/2/2020 8/18/2020   PHQ2 Score 4 4 4 4 4 5 5   PHQ9 Score 19 18 20 17 19 21 20     Interpretation of Total Score Depression Severity: 1-4 = Minimal depression, 5-9 = Mild depression, 10-14 = Moderate depression, 15-19 = Moderately severe depression, 20-27 = Severe depression      Diagnosis:     Persistent Depressive Disorder with Anxious Distress      Diagnosis Date    Arthritis     spine & fingers    Asthma     since childhood    Chronic back pain     Chronic headaches     Depression     Depression     PONV (postoperative nausea and vomiting)     Postpartum depression 9/16/2017    Seizures ([facility] [address]            Plan:  Pt interventions:  Discussed self-care (sleep, nutrition, rewarding activities, social support, exercise), Discussed parenting skills (positive reinforcement, routine, limit setting with consequences, time out, praise, mood management, sleep hygiene, social activities, pleasurable activities, physicial activities, problem solving), Glenn-setting to identify pt's primary goals for [facility] visit / overall health, Supportive techniques, Emphasized self-care as important for managing overall health and Provided pt book recommendation      Pt Behavioral Change Plan:  Check out [employer] groups  Check out the book The Whole Brain Child by Shilpi Deluna  Follow up in 2 weeks      Please note this report has been partially produced using speech recognition software  And may cause contain errors related to that system including grammar, punctuation and spelling as well as words and phrases that may seem inappropriate. If there are questions or concerns please feel free to contact me to clarify.

## 2020-11-11 ENCOUNTER — OFFICE VISIT (OUTPATIENT)
Dept: FAMILY MEDICINE CLINIC | Age: 30
End: 2020-11-11
Payer: COMMERCIAL

## 2020-11-11 VITALS
WEIGHT: 188 LBS | TEMPERATURE: 98 F | OXYGEN SATURATION: 98 % | RESPIRATION RATE: 15 BRPM | HEART RATE: 84 BPM | DIASTOLIC BLOOD PRESSURE: 87 MMHG | BODY MASS INDEX: 30.22 KG/M2 | SYSTOLIC BLOOD PRESSURE: 126 MMHG | HEIGHT: 66 IN

## 2020-11-11 PROCEDURE — G8484 FLU IMMUNIZE NO ADMIN: HCPCS | Performed by: INTERNAL MEDICINE

## 2020-11-11 PROCEDURE — G8427 DOCREV CUR MEDS BY ELIG CLIN: HCPCS | Performed by: INTERNAL MEDICINE

## 2020-11-11 PROCEDURE — G8417 CALC BMI ABV UP PARAM F/U: HCPCS | Performed by: INTERNAL MEDICINE

## 2020-11-11 PROCEDURE — 1036F TOBACCO NON-USER: CPT | Performed by: INTERNAL MEDICINE

## 2020-11-11 PROCEDURE — 99214 OFFICE O/P EST MOD 30 MIN: CPT | Performed by: INTERNAL MEDICINE

## 2020-11-11 RX ORDER — PREGABALIN 25 MG/1
25 CAPSULE ORAL 2 TIMES DAILY
COMMUNITY

## 2020-11-11 RX ORDER — MEDICAL SUPPLY, MISCELLANEOUS
EACH MISCELLANEOUS
Qty: 1 EACH | Refills: 0 | Status: SHIPPED | OUTPATIENT
Start: 2020-11-11 | End: 2021-05-12 | Stop reason: CLARIF

## 2020-11-11 ASSESSMENT — ENCOUNTER SYMPTOMS
EYE PAIN: 0
ABDOMINAL PAIN: 0
BACK PAIN: 0
SHORTNESS OF BREATH: 0

## 2020-11-11 NOTE — PROGRESS NOTES
Lifestyle    Physical activity     Days per week: Not on file     Minutes per session: Not on file    Stress: Not on file   Relationships    Social connections     Talks on phone: Not on file     Gets together: Not on file     Attends Jainism service: Not on file     Active member of club or organization: Not on file     Attends meetings of clubs or organizations: Not on file     Relationship status: Not on file    Intimate partner violence     Fear of current or ex partner: Not on file     Emotionally abused: Not on file     Physically abused: Not on file     Forced sexual activity: Not on file   Other Topics Concern    Not on file   Social History Narrative    Not on file     Allergies   Allergen Reactions    Citalopram      Was ineffective     Effexor [Venlafaxine]      MADE HER SUICIDAL    Fluvoxamine     Geodon [Ziprasidone Hcl] Other (See Comments)     tremors    Metronidazole Diarrhea and Nausea Only    Seasonal Other (See Comments)     Sinus sx    Sertraline      Stomach aches     Trintellix [Vortioxetine]     Vilazodone      WEIGHT GAIN     Wellbutrin [Bupropion]      SEIZURE    Onion Rash     stomach ache     Current Outpatient Medications on File Prior to Visit   Medication Sig Dispense Refill    pregabalin (LYRICA) 25 MG capsule Take 25 mg by mouth 2 times daily.       tiZANidine (ZANAFLEX) 2 MG tablet Take 2 mg by mouth every 6 hours as needed      fluticasone (FLOVENT HFA) 110 MCG/ACT inhaler Inhale 2 puffs into the lungs 2 times daily 1 Inhaler 2    VENTOLIN  (90 Base) MCG/ACT inhaler inhale 1 puffs by mouth EVERY 6 HOURS AS NEEDED FOR WHEEZING OR SHORTNESS OF BREATH 3 Inhaler 3     Current Facility-Administered Medications on File Prior to Visit   Medication Dose Route Frequency Provider Last Rate Last Dose    levonorgestrel (MIRENA) IUD 52 mg 1 each  1 each Intrauterine Once Gavi Martinez MD   1 each at 10/27/20 3635       I have personally reviewed the ROS, PMH, PFH, and social history     Review of Systems   Constitutional: Negative for chills and fever. HENT: Negative for congestion. Eyes: Negative for pain. Respiratory: Negative for shortness of breath. Cardiovascular: Negative for chest pain. Gastrointestinal: Negative for abdominal pain. Genitourinary: Negative for hematuria. Musculoskeletal: Negative for back pain. Allergic/Immunologic: Negative for immunocompromised state. Neurological: Negative for headaches. Psychiatric/Behavioral: Negative for hallucinations. Objective:   /87 (Site: Right Upper Arm, Position: Sitting, Cuff Size: Large Adult)   Pulse 84   Temp 98 °F (36.7 °C) (Oral)   Resp 15   Ht 5' 6\" (1.676 m)   Wt 188 lb (85.3 kg)   SpO2 98%   BMI 30.34 kg/m²     Physical Exam  Constitutional:       Appearance: She is well-developed. HENT:      Head: Normocephalic. Eyes:      Pupils: Pupils are equal, round, and reactive to light. Neck:      Vascular: No carotid bruit. Trachea: No tracheal deviation. Cardiovascular:      Rate and Rhythm: Normal rate and regular rhythm. Heart sounds: Normal heart sounds. No murmur. No friction rub. No gallop. Pulmonary:      Effort: No respiratory distress. Breath sounds: Normal breath sounds. No wheezing, rhonchi or rales. Abdominal:      General: Bowel sounds are normal. There is no distension. Palpations: Abdomen is soft. Tenderness: There is no abdominal tenderness. There is no right CVA tenderness, left CVA tenderness, guarding or rebound. Musculoskeletal:      Right lower leg: No edema. Left lower leg: No edema. Skin:     General: Skin is warm and dry. Neurological:      Mental Status: She is oriented to person, place, and time. Assessment:       Diagnosis Orders   1.  Essential hypertension  Blood Pressure Monitoring (B-D ASSURE BPM/AUTO ARM CUFF) MISC    TSH with Reflex    Vitamin D 25 Hydroxy    CBC Auto Differential Comprehensive Metabolic Panel    Lipid Panel   2. Anxiety  TSH with Reflex    Vitamin D 25 Hydroxy    CBC Auto Differential    Comprehensive Metabolic Panel    Lipid Panel   3. Moderate persistent asthma without complication  TSH with Reflex    Vitamin D 25 Hydroxy    CBC Auto Differential    Comprehensive Metabolic Panel    Lipid Panel   4. Vitamin D deficiency  TSH with Reflex    Vitamin D 25 Hydroxy    CBC Auto Differential    Comprehensive Metabolic Panel    Lipid Panel         Plan:    vc  Asthma better with flovent. Seeing Dr. Anastasiia Souza december, no changes made.  (fibromyalgia)  Continue chronic medications.    pap test 2019 through Dr. Joesph Giang, call if you stop seeing him. (06/2020)  Please see [facility]. 2 SDR with colon cancer will send to GI at age 35. Orders Placed This Encounter   Procedures    TSH with Reflex     Standing Status:   Future     Standing Expiration Date:   11/12/2021    Vitamin D 25 Hydroxy     Standing Status:   Future     Standing Expiration Date:   11/12/2021    CBC Auto Differential     Standing Status:   Future     Standing Expiration Date:   11/12/2021    Comprehensive Metabolic Panel     Standing Status:   Future     Standing Expiration Date:   11/12/2021    Lipid Panel     Standing Status:   Future     Standing Expiration Date:   11/12/2021     Order Specific Question:   Is Patient Fasting?/# of Hours     Answer:   10     Orders Placed This Encounter   Medications    Blood Pressure Monitoring (B-D ASSURE BPM/AUTO ARM CUFF) MISC     Sig: USE AS DIRECTED     Dispense:  1 each     Refill:  0   refused flu shot 2020   bp cuff  bp log  And call if >130/80  Refused pneumovax 23. Wash mouth out after each use, risk of thrush or pneumonia  She will be given contact info before she leaves for [facility] mental health. If theres any issues. If anything should change or worsen call ASAP, don't wait for next scheduled appointment.     Return in 3

## 2020-11-12 NOTE — PATIENT INSTRUCTIONS
Patient Education        fluticasone inhalation  Pronunciation:  floo TIK a zone  Brand:  ArmonAir RespiClick 125, Arnuity Ellipta, Flovent Diskus, Flovent HFA  What is the most important information I should know about fluticasone inhalation? Fluticasone inhalation is not a rescue medicine for asthma attacks. Seek medical attention if your breathing problems get worse quickly, or if you think your asthma medications are not working as well. You should not use the inhalation powder if you are allergic to milk proteins. What is fluticasone inhalation? Fluticasone inhalation is a steroid that is used to prevent asthma attacks in adults and children. Fluticasone inhalation is sometimes used together with steroid medicine taken by mouth. Flovent brands of fluticasone inhalation may be used in children as young as 3years old. Namon Shock should not be used in children younger than 11years old, and Adam Gutter should not be used in anyone younger than 15years old. Fluticasone may also be used for purposes not listed in this medication guide. What should I discuss with my healthcare provider before using fluticasone inhalation? You should not use this medicine if you are allergic to fluticasone. Do not use the inhalation powder (ArmonAir RespiClick, Arnuity Ellipta or Flovent Diskus) if you are allergic to milk proteins. Do not use fluticasone inhalation to treat an asthma attack that has already begun. Tell your doctor if you have ever had:  · any type of infection (bacterial, viral, or fungal);  · glaucoma or cataracts;  · an infection caused by parasites (such as giardia, malaria, leishmaniasis, hookworm, pinworm, toxoplasmosis, and many others);  · tuberculosis;  · herpes infection of the eyes;  · liver disease;  · low bone mineral density; or  · a weak immune system.   Long-term use of steroids may lead to bone loss (osteoporosis), especially if you smoke, if you do not exercise, if you do not get enough vitamin D or calcium in your diet, or if you have a family history of osteoporosis. Talk with your doctor about your risk. Tell your doctor if you are pregnant. It is not known whether fluticasone will harm an unborn baby. However, having untreated or uncontrolled asthma during pregnancy may cause complications such as low birth weight, premature birth, or eclampsia (dangerously high blood pressure that can lead to medical problems in both mother and baby). The benefit of treating asthma may outweigh any risks to the baby. It may not be safe to breast-feed while using this medicine. Ask your doctor about any risk. Do not give this medicine to a child without medical advice. How should I use fluticasone inhalation? Follow all directions on your prescription label and read all medication guides or instruction sheets. Use the medicine exactly as directed. Fluticasone inhalation is not a rescue medicine for asthma attacks. Use only fast-acting inhalation medicine for an attack. Seek medical attention if your breathing problems get worse quickly, or if you think your asthma medications are not working as well. Flovent is a liquid form of fluticasone used with an inhaler device. This device creates a spray that you inhale through your mouth and into your lungs. Flovent Diskus, ArmonAir, and Arnuity Ellipta are powder forms of fluticasone that come with a special inhaler device preloaded with blister packs containing measured doses of fluticasone. The device opens and loads a blister of fluticasone each time you use the inhaler. Read and carefully follow any Instructions for Use provided with your medicine. Ask your doctor or pharmacist if you do not understand these instructions. Do not allow a young child to use this medicine without help from an adult. Your dose needs may change if you have surgery, are ill, are under stress, or have recently had an asthma attack.  Do not change your medication dose or schedule without your doctor's advice. To reduce the chance of developing a yeast infection in your mouth, rinse with water (but do not swallow) after using this medicine. Pay special attention to your dental hygiene. Fluticasone can cause cavities or tooth discoloration. If you also use an oral steroid medication, you should not stop using it suddenly. Follow your doctor's instructions about tapering your dose. In case of emergency, wear or carry medical identification to let others know you may need an oral steroid in an emergency. Your doctor will need to check your progress on a regular basis. Your vision may also need to be checked at regular intervals. Call your doctor if your symptoms do not improve after 2 weeks of treatment, or if symptoms get worse. If you use a peak flow meter at home, call your doctor if your numbers are lower than normal.  You should not stop using fluticasone inhalation suddenly. Stopping suddenly may make your condition worse. Store at room temperature away from moisture, heat, and light. Store the powder forms in the unopened foil pouch or tray until ready to use. Throw away any unused medicine when the inhalations counter on your inhaler device shows \"0.\"  Store the Flovent HFA canister with the mouthpiece down. Keep the canister away from open flame or high heat, such as in a car on a hot day. The canister may explode if it gets too hot. Do not puncture or burn an empty inhaler canister. What happens if I miss a dose? Skip the missed dose and use your next dose at the regular time. Do not use two doses at one time. If you use Arnuity Ellipta, do not use more than 1 inhalation per day. What happens if I overdose? Seek emergency medical attention or call the Poison Help line at 1-654.215.7028. An overdose of fluticasone is not expected to produce life threatening symptoms.  Long term use of an inhaled steroid can lead to glaucoma, cataracts, thinning skin, changes in body fat (especially in your face, neck, back, and waist), increased acne or facial hair, menstrual problems, impotence, or loss of interest in sex. What should I avoid while using fluticasone inhalation? Avoid being near people who are sick or have infections. Call your doctor for preventive treatment if you are exposed to chickenpox or measles. These conditions can be serious or even fatal in people who are using steroid medicine. What are the possible side effects of fluticasone inhalation? Get emergency medical help if you have signs of an allergic reaction: hives; difficult breathing; swelling of your face, lips, tongue, or throat. Call your doctor at once if you have:  · weakness, tired feeling, nausea, vomiting, feeling like you might pass out;  · wheezing, choking, or other breathing problems after using this medicine;  · blurred vision, tunnel vision, eye pain, or seeing halos around lights;  · worsening of your asthma symptoms;  · blood vessel inflammation --fever, cough, stomach pain, weight loss, skin rash, severe tingling, numbness, chest pain; or  · liver problems --upper stomach pain, loss of appetite, dark urine, silverio-colored stools, jaundice (yellowing of the skin or eyes). Fluticasone can affect growth in children. Tell your doctor if your child is not growing at a normal rate while using this medicine. Common side effects may include:  · cold symptoms such as stuffy nose, sneezing, sore throat, sinus pain;  · low fever, cough, wheezing, chest tightness;  · hoarseness or deepened voice;  · white patches or sores inside your mouth or on your lips;  · headache; or  · nausea, vomiting, upset stomach. This is not a complete list of side effects and others may occur. Call your doctor for medical advice about side effects. You may report side effects to FDA at 8-244-FDA-3933. What other drugs will affect fluticasone inhalation? Sometimes it is not safe to use certain medications at the same time. Some drugs can affect your blood levels of other drugs you take, which may increase side effects or make the medications less effective. Tell your doctor about all your other medicines, especially:  · an antibiotic;  · antifungal medicine;  · antiviral medicine to treat HIV or AIDS; or  · steroid medicine. This list is not complete. Other drugs may affect fluticasone, including prescription and over-the-counter medicines, vitamins, and herbal products. Not all possible drug interactions are listed here. Where can I get more information? Your pharmacist can provide more information about fluticasone inhalation. Remember, keep this and all other medicines out of the reach of children, never share your medicines with others, and use this medication only for the indication prescribed. Every effort has been made to ensure that the information provided by [address] is accurate, up-to-date, and complete, but no guarantee is made to that effect. Drug information contained herein may be time sensitive. [facility] information has been compiled for use by healthcare practitioners and consumers in the [place] and therefore [employer] does not warrant that uses outside of the [place] are appropriate, unless specifically indicated otherwise. [facility]'s drug information does not endorse drugs, diagnose patients or recommend therapy. [facility]'s drug information is an informational resource designed to assist licensed healthcare practitioners in caring for their patients and/or to serve consumers viewing this service as a supplement to, and not a substitute for, the expertise, skill, knowledge and judgment of healthcare practitioners. The absence of a warning for a given drug or drug combination in no way should be construed to indicate that the drug or drug combination is safe, effective or appropriate for any given patient.  [facility] does not assume any responsibility for any aspect of healthcare administered with the aid of information [employer] provides. The information contained herein is not intended to cover all possible uses, directions, precautions, warnings, drug interactions, allergic reactions, or adverse effects. If you have questions about the drugs you are taking, check with your doctor, nurse or pharmacist.  Copyright 4447-1852 [address] Version: 14.02. Revision date: 12/30/2019. Care instructions adapted under license by [facility] ([facility]). If you have questions about a medical condition or this instruction, always ask your healthcare professional. [place] [address] any warranty or liability for your use of this information.

## 2020-11-18 ENCOUNTER — VIRTUAL VISIT (OUTPATIENT)
Dept: BEHAVIORAL/MENTAL HEALTH CLINIC | Age: 30
End: 2020-11-18
Payer: COMMERCIAL

## 2020-11-18 PROCEDURE — 90832 PSYTX W PT 30 MINUTES: CPT | Performed by: PSYCHOLOGIST

## 2020-11-18 ASSESSMENT — PATIENT HEALTH QUESTIONNAIRE - PHQ9
2. FEELING DOWN, DEPRESSED OR HOPELESS: 3
9. THOUGHTS THAT YOU WOULD BE BETTER OFF DEAD, OR OF HURTING YOURSELF: 2
SUM OF ALL RESPONSES TO PHQ9 QUESTIONS 1 & 2: 4
1. LITTLE INTEREST OR PLEASURE IN DOING THINGS: 1
10. IF YOU CHECKED OFF ANY PROBLEMS, HOW DIFFICULT HAVE THESE PROBLEMS MADE IT FOR YOU TO DO YOUR WORK, TAKE CARE OF THINGS AT HOME, OR GET ALONG WITH OTHER PEOPLE: 2
5. POOR APPETITE OR OVEREATING: 1
SUM OF ALL RESPONSES TO PHQ QUESTIONS 1-9: 17
7. TROUBLE CONCENTRATING ON THINGS, SUCH AS READING THE NEWSPAPER OR WATCHING TELEVISION: 2
3. TROUBLE FALLING OR STAYING ASLEEP: 2
SUM OF ALL RESPONSES TO PHQ QUESTIONS 1-9: 17
8. MOVING OR SPEAKING SO SLOWLY THAT OTHER PEOPLE COULD HAVE NOTICED. OR THE OPPOSITE, BEING SO FIGETY OR RESTLESS THAT YOU HAVE BEEN MOVING AROUND A LOT MORE THAN USUAL: 1
4. FEELING TIRED OR HAVING LITTLE ENERGY: 2
6. FEELING BAD ABOUT YOURSELF - OR THAT YOU ARE A FAILURE OR HAVE LET YOURSELF OR YOUR FAMILY DOWN: 3
SUM OF ALL RESPONSES TO PHQ QUESTIONS 1-9: 15

## 2020-11-18 NOTE — PROGRESS NOTES
Behavioral Health Consultation  Guerline Lennon Psy.D. Psychologist  11/18/20  3:34 PM EST      Time spent with Patient: 25 minutes  This is patient's 11th  [facility] appointment. Reason for Consult:  depression  Referring Provider: Linda Savage MD      Feedback given to PCP. TELEHEALTH EVALUATION -- Audio and/or Visual (During [facility] public health emergency)    Due to COVID 19 outbreak, patient's office visit was converted to a virtual visit. Patient was contacted and agreed to proceed with a virtual visit via [employer] me. Patient reports that they are located at home. Provider located at home office. The risks and benefits of converting to a virtual visit were discussed in light of the current infectious disease epidemic. Patient also understood that insurance coverage and co-pays are up to their individual insurance plans. Patient provides verbal consent for this visit to be billed to insurance. Pursuant to the emergency declaration under the [facility], [facility] waiver authority and the [employer] Act, this Virtual  Visit was conducted, with patient's consent, to reduce the patient's risk of exposure to COVID-19 and provide continuity of care for an established patient. Services were provided through an audio and video synchronous discussion virtually to substitute for in-person clinic visit.       S:  Pt provided update on functioning - notes no significant changes or recent stressors  Reports ongoing frustration with work  Reports some frustration, disappointment, and negative feelings about self related to recent interaction around getting a new tattoo  Has been spending time with best friend which she enjoys  Reports still not speaking with her father; he has made some attempts to reach out initially but not as much recently  Discussed family dynamics and difficulties in communication between her and her parents  Reports morbid ideation but no specific plan or intent to self-harm    O:  MSE:    Appearance    alert, cooperative   Personal Hygiene : appropriately dressed, appropriately groomed and healthy looking  Appetite abnormal: inc  Sleep disturbance Yes, including: non-restful sleep  Fatigue Yes  Loss of pleasure Yes  Impulsive behavior No  Speech    spontaneous, normal rate and normal volume  Mood   depressed   Affect    depressed affect  Thought Content    intact  Thought Process    linear, goal directed and coherent  Associations    logical connections  Insight   fair  Judgment   good  Orientation    oriented to person, place, time, and general circumstances  Memory    recent and remote memory intact  Attention/Concentration    intact  Morbid ideation No  Suicide Assessment    no suicidal ideation      History:    Medications:   Current Outpatient Medications   Medication Sig Dispense Refill    pregabalin (LYRICA) 25 MG capsule Take 25 mg by mouth 2 times daily.       Blood Pressure Monitoring (B-D ASSURE BPM/AUTO ARM CUFF) MISC USE AS DIRECTED 1 each 0    tiZANidine (ZANAFLEX) 2 MG tablet Take 2 mg by mouth every 6 hours as needed      fluticasone (FLOVENT HFA) 110 MCG/ACT inhaler Inhale 2 puffs into the lungs 2 times daily 1 Inhaler 2    VENTOLIN  (90 Base) MCG/ACT inhaler inhale 1 puffs by mouth EVERY 6 HOURS AS NEEDED FOR WHEEZING OR SHORTNESS OF BREATH 3 Inhaler 3     Current Facility-Administered Medications   Medication Dose Route Frequency Provider Last Rate Last Dose    levonorgestrel (MIRENA) IUD 52 mg 1 each  1 each Intrauterine Once Rebekah Alcantara MD   1 each at 10/27/20 7632       Social History:   Social History     Socioeconomic History    Marital status: Single     Spouse name: Not on file    Number of children: Not on file    Years of education: Not on file    Highest education level: Not on file   Occupational History    Not on file   Social Needs    Financial resource strain: Not on file    Food insecurity     Worry: Not on file     Inability: Not on file    Transportation needs     Medical: Not on file     Non-medical: Not on file   Tobacco Use    Smoking status: Never Smoker    Smokeless tobacco: Never Used   Substance and Sexual Activity    Alcohol use: Yes     Alcohol/week: 0.0 standard drinks     Comment: rare social    Drug use: Yes     Types: Marijuana     Comment: vape daily    Sexual activity: Yes     Partners: Male   Lifestyle    Physical activity     Days per week: Not on file     Minutes per session: Not on file    Stress: Not on file   Relationships    Social connections     Talks on phone: Not on file     Gets together: Not on file     Attends Taoism service: Not on file     Active member of club or organization: Not on file     Attends meetings of clubs or organizations: Not on file     Relationship status: Not on file    Intimate partner violence     Fear of current or ex partner: Not on file     Emotionally abused: Not on file     Physically abused: Not on file     Forced sexual activity: Not on file   Other Topics Concern    Not on file   Social History Narrative    Not on file       TOBACCO:   reports that she has never smoked. She has never used smokeless tobacco.  ETOH:   reports current alcohol use. Family History:   Family History   Problem Relation Age of Onset    Depression Mother     Arthritis Mother         TKR    High Blood Pressure Father         No congenital heart problems, no aneurysms.  Diabetes Father         Amberly Munoz might have had colon cancer she will update me.  Arthritis Maternal Grandmother     Cancer Maternal Grandmother         Unsure    Diabetes Maternal Grandmother     Arthritis Paternal Grandmother     Diabetes Paternal Grandmother     Stroke Paternal Grandmother     Cancer Paternal Grandfather         Colon? ? She's not sure     Arthritis Sister     No Known Problems Brother    Parris Wang Other Son pyloric stenosis / OR at 5 weeks age         A:  Administered the PHQ9 which indicates a self report of moderately severe symptom distress. Pt would benefit from [facility] services to increase coping skills to provide symptom management/control/relief. PHQ Scores 11/18/2020 11/4/2020 10/23/2020 10/14/2020 9/22/2020 9/11/2020 9/2/2020   PHQ2 Score 4 4 4 4 4 4 5   PHQ9 Score 17 19 18 20 17 19 21     Interpretation of Total Score Depression Severity: 1-4 = Minimal depression, 5-9 = Mild depression, 10-14 = Moderate depression, 15-19 = Moderately severe depression, 20-27 = Severe depression      Diagnosis:    Persistent Depressive Disorder with Anxious Distress      Diagnosis Date    Arthritis     spine & fingers    Asthma     since childhood    Chronic back pain     Chronic headaches     Depression     Depression     PONV (postoperative nausea and vomiting)     Postpartum depression 9/16/2017    Seizures ([facility] [address]            Plan:  Pt interventions:  Trained in strategies for increasing balanced thinking, Motivational Interviewing to determine importance and readiness for change, Supportive techniques, Emphasized self-care as important for managing overall health and Problem-solving re: communication      Pt Behavioral Change Plan:  Follow up in 2 weeks      Please note this report has been partially produced using speech recognition software  And may cause contain errors related to that system including grammar, punctuation and spelling as well as words and phrases that may seem inappropriate. If there are questions or concerns please feel free to contact me to clarify.

## 2020-12-03 ENCOUNTER — APPOINTMENT (OUTPATIENT)
Dept: GENERAL RADIOLOGY | Age: 30
End: 2020-12-03
Payer: COMMERCIAL

## 2020-12-03 ENCOUNTER — HOSPITAL ENCOUNTER (EMERGENCY)
Age: 30
Discharge: HOME | End: 2020-12-03
Attending: EMERGENCY MEDICINE
Payer: COMMERCIAL

## 2020-12-03 VITALS
WEIGHT: 189 LBS | OXYGEN SATURATION: 97 % | TEMPERATURE: 97.9 F | SYSTOLIC BLOOD PRESSURE: 133 MMHG | HEIGHT: 66 IN | HEART RATE: 89 BPM | RESPIRATION RATE: 16 BRPM | DIASTOLIC BLOOD PRESSURE: 92 MMHG | BODY MASS INDEX: 30.37 KG/M2

## 2020-12-03 LAB
ALBUMIN SERPL-MCNC: 4.8 G/DL (ref 3.5–4.6)
ALP BLD-CCNC: 69 U/L (ref 40–130)
ALT SERPL-CCNC: 28 U/L (ref 0–33)
ANION GAP SERPL CALCULATED.3IONS-SCNC: 12 MEQ/L (ref 9–15)
AST SERPL-CCNC: 19 U/L (ref 0–35)
BACTERIA: ABNORMAL /HPF
BASOPHILS ABSOLUTE: 0 K/UL (ref 0–0.2)
BASOPHILS RELATIVE PERCENT: 0.6 %
BILIRUB SERPL-MCNC: 0.3 MG/DL (ref 0.2–0.7)
BILIRUBIN URINE: NEGATIVE
BLOOD, URINE: ABNORMAL
BUN BLDV-MCNC: 9 MG/DL (ref 6–20)
CALCIUM SERPL-MCNC: 9.5 MG/DL (ref 8.5–9.9)
CHLORIDE BLD-SCNC: 105 MEQ/L (ref 95–107)
CLARITY: CLEAR
CO2: 24 MEQ/L (ref 20–31)
COLOR: YELLOW
CREAT SERPL-MCNC: 0.61 MG/DL (ref 0.5–0.9)
EOSINOPHILS ABSOLUTE: 0 K/UL (ref 0–0.7)
EOSINOPHILS RELATIVE PERCENT: 0.5 %
EPITHELIAL CELLS, UA: ABNORMAL /HPF (ref 0–5)
GFR AFRICAN AMERICAN: >60
GFR NON-AFRICAN AMERICAN: >60
GLOBULIN: 3.1 G/DL (ref 2.3–3.5)
GLUCOSE BLD-MCNC: 85 MG/DL (ref 70–99)
GLUCOSE URINE: NEGATIVE MG/DL
HCT VFR BLD CALC: 43.3 % (ref 37–47)
HEMOGLOBIN: 14.7 G/DL (ref 12–16)
HYALINE CASTS: ABNORMAL /HPF (ref 0–5)
KETONES, URINE: >=80 MG/DL
LEUKOCYTE ESTERASE, URINE: ABNORMAL
LIPASE: 9 U/L (ref 12–95)
LYMPHOCYTES ABSOLUTE: 1.5 K/UL (ref 1–4.8)
LYMPHOCYTES RELATIVE PERCENT: 22.1 %
MAGNESIUM: 2.1 MG/DL (ref 1.7–2.4)
MCH RBC QN AUTO: 30.3 PG (ref 27–31.3)
MCHC RBC AUTO-ENTMCNC: 33.9 % (ref 33–37)
MCV RBC AUTO: 89.4 FL (ref 82–100)
MONOCYTES ABSOLUTE: 0.3 K/UL (ref 0.2–0.8)
MONOCYTES RELATIVE PERCENT: 3.9 %
NEUTROPHILS ABSOLUTE: 4.9 K/UL (ref 1.4–6.5)
NEUTROPHILS RELATIVE PERCENT: 72.9 %
NITRITE, URINE: NEGATIVE
PDW BLD-RTO: 12.5 % (ref 11.5–14.5)
PH UA: 7 (ref 5–9)
PLATELET # BLD: 229 K/UL (ref 130–400)
POTASSIUM SERPL-SCNC: 3.7 MEQ/L (ref 3.4–4.9)
PREGNANCY TEST URINE, POC: NORMAL
PROTEIN UA: NEGATIVE MG/DL
RBC # BLD: 4.84 M/UL (ref 4.2–5.4)
RBC UA: ABNORMAL /HPF (ref 0–5)
SODIUM BLD-SCNC: 141 MEQ/L (ref 135–144)
SPECIFIC GRAVITY UA: 1.02 (ref 1–1.03)
TOTAL PROTEIN: 7.9 G/DL (ref 6.3–8)
URINE REFLEX TO CULTURE: ABNORMAL
UROBILINOGEN, URINE: 0.2 E.U./DL
WBC # BLD: 6.8 K/UL (ref 4.8–10.8)
WBC UA: ABNORMAL /HPF (ref 0–5)

## 2020-12-03 PROCEDURE — 2580000003 HC RX 258: Performed by: EMERGENCY MEDICINE

## 2020-12-03 PROCEDURE — 36415 COLL VENOUS BLD VENIPUNCTURE: CPT

## 2020-12-03 PROCEDURE — 96372 THER/PROPH/DIAG INJ SC/IM: CPT

## 2020-12-03 PROCEDURE — 81001 URINALYSIS AUTO W/SCOPE: CPT

## 2020-12-03 PROCEDURE — 2500000003 HC RX 250 WO HCPCS: Performed by: EMERGENCY MEDICINE

## 2020-12-03 PROCEDURE — 83735 ASSAY OF MAGNESIUM: CPT

## 2020-12-03 PROCEDURE — 85025 COMPLETE CBC W/AUTO DIFF WBC: CPT

## 2020-12-03 PROCEDURE — 87651 STREP A DNA AMP PROBE: CPT

## 2020-12-03 PROCEDURE — 6360000002 HC RX W HCPCS: Performed by: EMERGENCY MEDICINE

## 2020-12-03 PROCEDURE — 96374 THER/PROPH/DIAG INJ IV PUSH: CPT

## 2020-12-03 PROCEDURE — 99283 EMERGENCY DEPT VISIT LOW MDM: CPT

## 2020-12-03 PROCEDURE — 80053 COMPREHEN METABOLIC PANEL: CPT

## 2020-12-03 PROCEDURE — 74018 RADEX ABDOMEN 1 VIEW: CPT

## 2020-12-03 PROCEDURE — 96375 TX/PRO/DX INJ NEW DRUG ADDON: CPT

## 2020-12-03 PROCEDURE — 83690 ASSAY OF LIPASE: CPT

## 2020-12-03 RX ORDER — ONDANSETRON 4 MG/1
4 TABLET, ORALLY DISINTEGRATING ORAL 3 TIMES DAILY PRN
Qty: 21 TABLET | Refills: 0 | Status: SHIPPED | OUTPATIENT
Start: 2020-12-03 | End: 2021-01-13

## 2020-12-03 RX ORDER — ONDANSETRON 2 MG/ML
4 INJECTION INTRAMUSCULAR; INTRAVENOUS ONCE
Status: COMPLETED | OUTPATIENT
Start: 2020-12-03 | End: 2020-12-03

## 2020-12-03 RX ORDER — CIPROFLOXACIN 500 MG/1
500 TABLET, FILM COATED ORAL 2 TIMES DAILY
Qty: 6 TABLET | Refills: 0 | Status: SHIPPED | OUTPATIENT
Start: 2020-12-03 | End: 2020-12-06

## 2020-12-03 RX ORDER — MORPHINE SULFATE 2 MG/ML
4 INJECTION, SOLUTION INTRAMUSCULAR; INTRAVENOUS
Status: DISCONTINUED | OUTPATIENT
Start: 2020-12-03 | End: 2020-12-03 | Stop reason: HOSPADM

## 2020-12-03 RX ORDER — PROMETHAZINE HYDROCHLORIDE 25 MG/ML
25 INJECTION, SOLUTION INTRAMUSCULAR; INTRAVENOUS ONCE
Status: COMPLETED | OUTPATIENT
Start: 2020-12-03 | End: 2020-12-03

## 2020-12-03 RX ORDER — LOPERAMIDE HYDROCHLORIDE 2 MG/1
2 CAPSULE ORAL 4 TIMES DAILY PRN
Qty: 20 CAPSULE | Refills: 0 | Status: SHIPPED | OUTPATIENT
Start: 2020-12-03 | End: 2020-12-13

## 2020-12-03 RX ORDER — 0.9 % SODIUM CHLORIDE 0.9 %
1000 INTRAVENOUS SOLUTION INTRAVENOUS ONCE
Status: COMPLETED | OUTPATIENT
Start: 2020-12-03 | End: 2020-12-03

## 2020-12-03 RX ORDER — TRAMADOL HYDROCHLORIDE 50 MG/1
50 TABLET ORAL EVERY 4 HOURS PRN
Qty: 18 TABLET | Refills: 0 | Status: SHIPPED | OUTPATIENT
Start: 2020-12-03 | End: 2020-12-06

## 2020-12-03 RX ORDER — KETOROLAC TROMETHAMINE 30 MG/ML
30 INJECTION, SOLUTION INTRAMUSCULAR; INTRAVENOUS ONCE
Status: COMPLETED | OUTPATIENT
Start: 2020-12-03 | End: 2020-12-03

## 2020-12-03 RX ADMIN — SODIUM CHLORIDE 1000 ML: 9 INJECTION, SOLUTION INTRAVENOUS at 16:10

## 2020-12-03 RX ADMIN — KETOROLAC TROMETHAMINE 30 MG: 30 INJECTION, SOLUTION INTRAMUSCULAR at 16:22

## 2020-12-03 RX ADMIN — ONDANSETRON 4 MG: 2 INJECTION INTRAMUSCULAR; INTRAVENOUS at 16:22

## 2020-12-03 RX ADMIN — FAMOTIDINE 20 MG: 10 INJECTION INTRAVENOUS at 16:22

## 2020-12-03 RX ADMIN — PROMETHAZINE HYDROCHLORIDE 25 MG: 25 INJECTION, SOLUTION INTRAMUSCULAR; INTRAVENOUS at 16:22

## 2020-12-03 ASSESSMENT — ENCOUNTER SYMPTOMS
DIARRHEA: 1
ABDOMINAL PAIN: 1
BACK PAIN: 0
COUGH: 0
SORE THROAT: 0
VOMITING: 1
SHORTNESS OF BREATH: 0
NAUSEA: 1

## 2020-12-03 ASSESSMENT — PAIN SCALES - GENERAL
PAINLEVEL_OUTOF10: 6
PAINLEVEL_OUTOF10: 6
PAINLEVEL_OUTOF10: 4

## 2020-12-03 ASSESSMENT — PAIN DESCRIPTION - DESCRIPTORS: DESCRIPTORS: RADIATING

## 2020-12-03 ASSESSMENT — PAIN DESCRIPTION - LOCATION: LOCATION: ABDOMEN

## 2020-12-03 NOTE — ED PROVIDER NOTES
[address] [facility] ED  eMERGENCYdEPARTMENT eNCOUnter      Pt Name: Bernardo Sharma  MRN: [id number]  [place] 1990  Date of evaluation: 12/3/2020  Anuj Ulrich MD    CHIEF COMPLAINT           HPI  Bernardo Sharma is a 26 y.o. female per chart review has a h/o seizures, asthma, OA, depression/anxiety presents to the ED with ab pain, n/v/d. Pt notes gradual onset, moderate, intermittent, cramping, upper ab pain x 3 days. +N/v/d. Pt denies fever, cp, sob, dysuria. No sick contacts. ROS  Review of Systems   Constitutional: Negative for activity change, chills and fever. HENT: Negative for ear pain and sore throat. Eyes: Negative for visual disturbance. Respiratory: Negative for cough and shortness of breath. Cardiovascular: Negative for chest pain, palpitations and leg swelling. Gastrointestinal: Positive for abdominal pain, diarrhea, nausea and vomiting. Genitourinary: Negative for dysuria. Musculoskeletal: Negative for back pain. Skin: Negative for rash. Neurological: Negative for dizziness and weakness. Except as noted above the remainder of the review of systems was reviewed and negative.        PAST MEDICAL HISTORY     Past Medical History:   Diagnosis Date    Arthritis     spine & fingers    Asthma     since childhood    Chronic back pain     Chronic headaches     Depression     Depression     PONV (postoperative nausea and vomiting)     Postpartum depression 2017    Seizures ([facility] [address]          SURGICAL HISTORY       Past Surgical History:   Procedure Laterality Date    APPENDECTOMY  2016    CERVICAL LAMINECTOMY Left 2019    POSTERIOR FORAMINOTOMIES C 5-6, C 6-7 LEFT performed by Adri Dent MD at [address] [place] [address] N/A 2017     SECTION performed by Aj Elena MD at [facility] L[facility] OR    LAPAROSCOPIC APPENDECTOMY  2016    Dr Kike Acosta       Previous Medications    BLOOD PRESSURE MONITORING (B-D ASSURE BPM/AUTO ARM CUFF) MISC    USE AS DIRECTED    FLUTICASONE (FLOVENT HFA) 110 MCG/ACT INHALER    Inhale 2 puffs into the lungs 2 times daily    PREGABALIN (LYRICA) 25 MG CAPSULE    Take 25 mg by mouth 2 times daily. TIZANIDINE (ZANAFLEX) 2 MG TABLET    Take 2 mg by mouth every 6 hours as needed    VENTOLIN  (90 BASE) MCG/ACT INHALER    inhale 1 puffs by mouth EVERY 6 HOURS AS NEEDED FOR WHEEZING OR SHORTNESS OF BREATH       ALLERGIES     Citalopram; Effexor [venlafaxine]; Fluvoxamine; Geodon [ziprasidone hcl]; Metronidazole; Seasonal; Sertraline; Trintellix [vortioxetine]; Vilazodone; Wellbutrin [bupropion]; and Onion    FAMILY HISTORY       Family History   Problem Relation Age of Onset    Depression Mother     Arthritis Mother         TKR    High Blood Pressure Father         No congenital heart problems, no aneurysms.  Diabetes Father         Caridad Ann might have had colon cancer she will update me.  Arthritis Maternal Grandmother     Cancer Maternal Grandmother         Unsure    Diabetes Maternal Grandmother     Arthritis Paternal Grandmother     Diabetes Paternal Grandmother     Stroke Paternal Grandmother     Cancer Paternal Grandfather         Colon? ? She's not sure     Arthritis Sister     No Known Problems Brother     Other Son         pyloric stenosis / OR at 10 weeks age          SOCIAL HISTORY       Social History     Socioeconomic History    Marital status: Single     Spouse name: None    Number of children: None    Years of education: None    Highest education level: None   Occupational History    None   Social Needs    Financial resource strain: None    Food insecurity     Worry: None     Inability: None    Transportation needs     Medical: None     Non-medical: None   Tobacco Use    Smoking status: Never Smoker    Smokeless tobacco: Never Used   Substance and Sexual Activity    Alcohol use: Not Currently     Alcohol/week: 0.0 standard drinks    Drug use: Yes     Types: Marijuana     Comment: vape daily    Sexual activity: Yes     Partners: Male   Lifestyle    Physical activity     Days per week: None     Minutes per session: None    Stress: None   Relationships    Social connections     Talks on phone: None     Gets together: None     Attends Restorationist service: None     Active member of club or organization: None     Attends meetings of clubs or organizations: None     Relationship status: None    Intimate partner violence     Fear of current or ex partner: None     Emotionally abused: None     Physically abused: None     Forced sexual activity: None   Other Topics Concern    None   Social History Narrative    None         PHYSICAL EXAM       ED Triage Vitals   BP Temp Temp src Pulse Resp SpO2 Height Weight   12/03/20 1427 12/03/20 1425 -- 12/03/20 1425 12/03/20 1425 12/03/20 1425 12/03/20 1425 12/03/20 1425   121/71 97.9 °F (36.6 °C)  111 17 99 % 5' 6\" (1.676 m) 189 lb (85.7 kg)       Physical Exam  Vitals signs and nursing note reviewed. Constitutional:       Appearance: She is well-developed. HENT:      Head: Normocephalic. Right Ear: External ear normal.      Left Ear: External ear normal.   Eyes:      Conjunctiva/sclera: Conjunctivae normal.      Pupils: Pupils are equal, round, and reactive to light. Neck:      Musculoskeletal: Normal range of motion and neck supple. Cardiovascular:      Rate and Rhythm: Normal rate and regular rhythm. Heart sounds: Normal heart sounds. Pulmonary:      Effort: Pulmonary effort is normal.      Breath sounds: Normal breath sounds. Abdominal:      General: Bowel sounds are normal. There is no distension. Palpations: Abdomen is soft. Tenderness: There is abdominal tenderness in the right upper quadrant, epigastric area and left upper quadrant. There is no guarding or rebound. Musculoskeletal: Normal range of motion. Skin:     General: Skin is warm and dry. Neurological:      Mental Status: She is alert and oriented to person, place, and time. Psychiatric:         Mood and Affect: Mood normal.           MDM  27 yo female presents to the ED with ab pain, n/v/d. Pt is afebrile, hemodynamically stable. Pt given 1 L NS, IV morphine, IV zofran, IV pepcid, IM phenergan with moderate relief. Labs unremarkable. UA negative. KUB negative. Pt reassessed and feels much better. Pt able to tolerate PO in the ED. Pt educated about ab pain, n/v/d. Pt given prescription for tramadol, zofran, loperamide, cipro. Pt given ab pain, n/v/d warning signs and will f/u with pcp. Pt understands plan. FINAL IMPRESSION      1. Abdominal pain, unspecified abdominal location    2. Non-intractable vomiting with nausea, unspecified vomiting type    3.  Diarrhea, unspecified type          DISPOSITION/PLAN   DISPOSITION Decision To Discharge 12/03/2020 05:41:48 PM        DISCHARGE MEDICATIONS:  [unfilled]         Erasmo Monae MD(electronically signed)  Attending Emergency Physician            Erasmo Monae MD  12/03/20 3088

## 2020-12-03 NOTE — ED TRIAGE NOTES
Pt c/o abdominal pain for the last 2 days with diarrhea and n&v that started today. Pt rates abd pain at 6/10.  Pt also c/o sore throat that also started 2 days ago

## 2020-12-04 ENCOUNTER — VIRTUAL VISIT (OUTPATIENT)
Dept: BEHAVIORAL/MENTAL HEALTH CLINIC | Age: 30
End: 2020-12-04
Payer: COMMERCIAL

## 2020-12-04 ENCOUNTER — VIRTUAL VISIT (OUTPATIENT)
Dept: FAMILY MEDICINE CLINIC | Age: 30
End: 2020-12-04
Payer: COMMERCIAL

## 2020-12-04 DIAGNOSIS — Z91.89 AT INCREASED RISK OF EXPOSURE TO COVID-19 VIRUS: ICD-10-CM

## 2020-12-04 PROCEDURE — 90832 PSYTX W PT 30 MINUTES: CPT | Performed by: PSYCHOLOGIST

## 2020-12-04 PROCEDURE — 99441 PR PHYS/QHP TELEPHONE EVALUATION 5-10 MIN: CPT | Performed by: PHYSICIAN ASSISTANT

## 2020-12-04 ASSESSMENT — PATIENT HEALTH QUESTIONNAIRE - PHQ9
9. THOUGHTS THAT YOU WOULD BE BETTER OFF DEAD, OR OF HURTING YOURSELF: 3
8. MOVING OR SPEAKING SO SLOWLY THAT OTHER PEOPLE COULD HAVE NOTICED. OR THE OPPOSITE, BEING SO FIGETY OR RESTLESS THAT YOU HAVE BEEN MOVING AROUND A LOT MORE THAN USUAL: 2
SUM OF ALL RESPONSES TO PHQ QUESTIONS 1-9: 25
7. TROUBLE CONCENTRATING ON THINGS, SUCH AS READING THE NEWSPAPER OR WATCHING TELEVISION: 2
2. FEELING DOWN, DEPRESSED OR HOPELESS: 3
SUM OF ALL RESPONSES TO PHQ9 QUESTIONS 1 & 2: 6
SUM OF ALL RESPONSES TO PHQ QUESTIONS 1-9: 25
10. IF YOU CHECKED OFF ANY PROBLEMS, HOW DIFFICULT HAVE THESE PROBLEMS MADE IT FOR YOU TO DO YOUR WORK, TAKE CARE OF THINGS AT HOME, OR GET ALONG WITH OTHER PEOPLE: 2
SUM OF ALL RESPONSES TO PHQ QUESTIONS 1-9: 22
3. TROUBLE FALLING OR STAYING ASLEEP: 3
1. LITTLE INTEREST OR PLEASURE IN DOING THINGS: 3
5. POOR APPETITE OR OVEREATING: 3
4. FEELING TIRED OR HAVING LITTLE ENERGY: 3
6. FEELING BAD ABOUT YOURSELF - OR THAT YOU ARE A FAILURE OR HAVE LET YOURSELF OR YOUR FAMILY DOWN: 3

## 2020-12-04 ASSESSMENT — ENCOUNTER SYMPTOMS
EYE DISCHARGE: 0
COLOR CHANGE: 0
APNEA: 0
SORE THROAT: 1
EYE PAIN: 0
NAUSEA: 1
VOMITING: 1
ABDOMINAL PAIN: 0
DIARRHEA: 1
ABDOMINAL DISTENTION: 0
BLOOD IN STOOL: 0
COUGH: 0
EYE REDNESS: 0
SHORTNESS OF BREATH: 0

## 2020-12-04 NOTE — PROGRESS NOTES
TELEHEALTH EVALUATION -- Audio/Visual (During GMPLX-68 public health emergency)    -   Ryan Bonilla is a 26 y.o. female being evaluated by a Virtual Visit (video visit) encounter to address concerns as mentioned above. A caregiver was present when appropriate. Due to this being a TeleHealth encounter (During MZRXT-90 public health emergency), evaluation of the following organ systems was limited: Vitals/Constitutional/EENT/Resp/CV/GI//MS/Neuro/Skin/Heme-Lymph-Imm. Pursuant to the emergency declaration under the [facility], [address] [place] [address] authority and the [employer] Act, this Virtual Visit was conducted with patient's (and/or legal guardian's) consent, to reduce the patient's risk of exposure to COVID-19 and provide necessary medical care. The patient (and/or legal guardian) has also been advised to contact this office for worsening conditions or problems, and seek emergency medical treatment and/or call 911 if deemed necessary. Patient was contacted and agreed to proceed with a virtual visit via Telephone Visit  The risks and benefits of converting to a virtual visit were discussed in light of the current infectious disease epidemic. Patient also understood that insurance coverage and co-pays are up to their individual insurance plans. Patient was located at their home. Provider was located at their office. 2020  Ryan Bonilla (:  1990) has requested an audio/video evaluation for the following concern(s):    HPI  26year old female who complains of a headache, sore throat, nausea and vomiting, fatigue for the past week. She has no sense of taste. She has no known positive COVID-19 associates. Review of Systems   Constitutional: Positive for fatigue. Negative for appetite change, chills and fever. HENT: Positive for congestion and sore throat.  Negative for ear pain and postnasal drip. Eyes: Negative for pain, discharge and redness. Respiratory: Negative for apnea, cough and shortness of breath. Cardiovascular: Negative for chest pain, palpitations and leg swelling. Gastrointestinal: Positive for diarrhea, nausea and vomiting. Negative for abdominal distention, abdominal pain and blood in stool. Endocrine: Negative for cold intolerance, polydipsia and polyuria. Genitourinary: Negative for difficulty urinating, flank pain and hematuria. Musculoskeletal: Negative for arthralgias, gait problem and neck stiffness. Skin: Negative for color change, rash and wound. Neurological: Positive for headaches. Negative for dizziness, seizures and light-headedness. Hematological: Negative for adenopathy. Psychiatric/Behavioral: Negative for agitation, confusion and hallucinations. Prior to Visit Medications    Medication Sig Taking? Authorizing Provider   traMADol (ULTRAM) 50 MG tablet Take 1 tablet by mouth every 4 hours as needed for Pain for up to 3 days. Intended supply: 3 days. Take lowest dose possible to manage pain  Hong Yusuf MD   ondansetron (ZOFRAN-ODT) 4 MG disintegrating tablet Take 1 tablet by mouth 3 times daily as needed for Nausea or Vomiting  Hong Yusuf MD   loperamide (RA ANTI-DIARRHEAL) 2 MG capsule Take 1 capsule by mouth 4 times daily as needed for Diarrhea  Hong Yusuf MD   ciprofloxacin (CIPRO) 500 MG tablet Take 1 tablet by mouth 2 times daily for 3 days  Hong Yusuf MD   pregabalin (LYRICA) 25 MG capsule Take 25 mg by mouth 2 times daily.   Historical Provider, MD   Blood Pressure Monitoring (B-D ASSURE BPM/AUTO ARM CUFF) MISC USE AS DIRECTED  Jennyfer Robles MD   tiZANidine (ZANAFLEX) 2 MG tablet Take 2 mg by mouth every 6 hours as needed  Historical Provider, MD   fluticasone (FLOVENT HFA) 110 MCG/ACT inhaler Inhale 2 puffs into the lungs 2 times daily  Jennyfer Robles MD   VENTOLIN  (90 Base) MCG/ACT inhaler Not on file    Intimate partner violence     Fear of current or ex partner: Not on file     Emotionally abused: Not on file     Physically abused: Not on file     Forced sexual activity: Not on file   Other Topics Concern    Not on file   Social History Narrative    Not on file     Family History   Problem Relation Age of Onset    Depression Mother    Brenda Eng Arthritis Mother         TKR    High Blood Pressure Father         No congenital heart problems, no aneurysms.  Diabetes Father         Zoe Olivares might have had colon cancer she will update me.  Arthritis Maternal Grandmother     Cancer Maternal Grandmother         Unsure    Diabetes Maternal Grandmother     Arthritis Paternal Grandmother     Diabetes Paternal Grandmother     Stroke Paternal Grandmother     Cancer Paternal Grandfather         Colon? ? She's not sure     Arthritis Sister     No Known Problems Brother     Other Son         pyloric stenosis / OR at 5 weeks age     Allergies   Allergen Reactions    Citalopram      Was ineffective     Effexor [Venlafaxine]      MADE HER SUICIDAL    Fluvoxamine     Geodon [Ziprasidone Hcl] Other (See Comments)     tremors    Metronidazole Diarrhea and Nausea Only    Seasonal Other (See Comments)     Sinus sx    Sertraline      Stomach aches     Trintellix [Vortioxetine]     Vilazodone      WEIGHT GAIN     Wellbutrin [Bupropion]      SEIZURE    Onion Rash     stomach ache       PMH, Surgical Hx, Family Hx, and Social Hx reviewed and updated. Health Maintenance reviewed.     PHYSICAL EXAMINATION:  \"[x]\" Indicates a positive item  \"[]\" Indicates a negative item    Vital Signs: (As obtained by patient/caregiver or practitioner observation)    Blood pressure-  Heart rate-    Respiratory rate-    Temperature-  Pulse oximetry-     Constitutional: [x] Appears well-developed and well-nourished [x] No apparent distress      [] Abnormal-   Mental status  [x] Alert and awake  [x] Oriented to person/place/time [x]Able to follow commands      Eyes:  EOM    []  Normal  [] Abnormal-  Sclera  [x]  Normal  [] Abnormal -         Discharge [x]  None visible  [] Abnormal -    HENT:   [x] Normocephalic, atraumatic.   [] Abnormal   [x] Mouth/Throat: Mucous membranes are moist.     External Ears [x] Normal  [] Abnormal-     Neck: [x] No visualized mass     Pulmonary/Chest: [x] Respiratory effort normal.  [x] No visualized signs of difficulty breathing or respiratory distress        [] Abnormal-      Musculoskeletal:   [x] Normal gait with no signs of ataxia         [x] Normal range of motion of neck        [] Abnormal-       Neurological:       [x] No Facial Asymmetry (Cranial nerve 7 motor function) (limited exam to video visit)          [x] No gaze palsy        [] Abnormal-         Skin:        [x] No significant exanthematous lesions or discoloration noted on facial skin         [] Abnormal-            Psychiatric:       [x] Normal Affect [x] No Hallucinations        [] Abnormal-     Other pertinent observable physical exam findings-   Results for orders placed or performed during the hospital encounter of 12/03/20   Urine Reflex to Culture    Specimen: Urine, clean catch   Result Value Ref Range    Color, UA Yellow Straw/Yellow    Clarity, UA Clear Clear    Glucose, Ur Negative Negative mg/dL    Bilirubin Urine Negative Negative    Ketones, Urine >=80 (A) Negative mg/dL    Specific Gravity, UA 1.023 1.005 - 1.030    Blood, Urine TRACE (A) Negative    pH, UA 7.0 5.0 - 9.0    Protein, UA Negative Negative mg/dL    Urobilinogen, Urine 0.2 <2.0 E.U./dL    Nitrite, Urine Negative Negative    Leukocyte Esterase, Urine TRACE (A) Negative    Urine Reflex to Culture Not Indicated    Microscopic Urinalysis   Result Value Ref Range    Bacteria, UA RARE (A) Negative /HPF    Hyaline Casts, UA 3-5 0 - 5 /HPF    WBC, UA 6-9 (A) 0 - 5 /HPF    RBC, UA 6-10 (A) 0 - 5 /HPF    Epithelial Cells, UA 6-10 0 - 5 /HPF   Comprehensive Metabolic Panel   Result Value Ref Range    Sodium 141 135 - 144 mEq/L    Potassium 3.7 3.4 - 4.9 mEq/L    Chloride 105 95 - 107 mEq/L    CO2 24 20 - 31 mEq/L    Anion Gap 12 9 - 15 mEq/L    Glucose 85 70 - 99 mg/dL    BUN 9 6 - 20 mg/dL    CREATININE 0.61 0.50 - 0.90 mg/dL    GFR Non-African American >60.0 >60    GFR  >60.0 >60    Calcium 9.5 8.5 - 9.9 mg/dL    Total Protein 7.9 6.3 - 8.0 g/dL    Alb 4.8 (H) 3.5 - 4.6 g/dL    Total Bilirubin 0.3 0.2 - 0.7 mg/dL    Alkaline Phosphatase 69 40 - 130 U/L    ALT 28 0 - 33 U/L    AST 19 0 - 35 U/L    Globulin 3.1 2.3 - 3.5 g/dL   CBC Auto Differential   Result Value Ref Range    WBC 6.8 4.8 - 10.8 K/uL    RBC 4.84 4.20 - 5.40 M/uL    Hemoglobin 14.7 12.0 - 16.0 g/dL    Hematocrit 43.3 37.0 - 47.0 %    MCV 89.4 82.0 - 100.0 fL    MCH 30.3 27.0 - 31.3 pg    MCHC 33.9 33.0 - 37.0 %    RDW 12.5 11.5 - 14.5 %    Platelets 455 054 - 193 K/uL    Neutrophils % 72.9 %    Lymphocytes % 22.1 %    Monocytes % 3.9 %    Eosinophils % 0.5 %    Basophils % 0.6 %    Neutrophils Absolute 4.9 1.4 - 6.5 K/uL    Lymphocytes Absolute 1.5 1.0 - 4.8 K/uL    Monocytes Absolute 0.3 0.2 - 0.8 K/uL    Eosinophils Absolute 0.0 0.0 - 0.7 K/uL    Basophils Absolute 0.0 0.0 - 0.2 K/uL   Lipase   Result Value Ref Range    Lipase 9 (L) 12 - 95 U/L   Magnesium   Result Value Ref Range    Magnesium 2.1 1.7 - 2.4 mg/dL   POCT urine pregnancy   Result Value Ref Range    Preg Test, Ur neg        ASSESSMENT/PLAN:  Assessment & Plan   There are no diagnoses linked to this encounter. No orders of the defined types were placed in this encounter. No orders of the defined types were placed in this encounter. There are no discontinued medications. No follow-ups on file. Reviewed with the patient: current clinical status, medications, activities and diet.      Side effects, adverse effects of the medication prescribed today, as well as treatment plan/ rationale and result expectations have been discussed with the patient who expresses understanding and desires to proceed. Close follow up to evaluate treatment results and for coordination of care. I have reviewed the patient's medical history in detail and updated the computerized patient record. Patient identification was verified at the start of the visit: Yes    Total time spent on this encounter: Not billed by time      --MARTINA Martinez on 12/4/2020 at 4:27 PM    An electronic signature was used to authenticate this note.

## 2020-12-04 NOTE — PROGRESS NOTES
Behavioral Health Consultation  Valdez Lind Psy.D. Psychologist  12/4/20  3:35 PM EST      Time spent with Patient: 24 minutes  This is patient's 12th  [facility] appointment. Reason for Consult:  depression  Referring Provider: bA Bautista MD      Feedback given to PCP. TELEHEALTH EVALUATION -- Audio and/or Visual (During YBTPO-85 public health emergency)    Due to COVID 19 outbreak, patient's office visit was converted to a virtual visit. Patient was contacted and agreed to proceed with a virtual visit via [employer]. Patient reports that they are located at home. Provider located at home office. The risks and benefits of converting to a virtual visit were discussed in light of the current infectious disease epidemic. Patient also understood that insurance coverage and co-pays are up to their individual insurance plans. Patient provides verbal consent for this visit to be billed to insurance. Pursuant to the emergency declaration under the [facility], [facility] waiver authority and the [place] [employer] Act, this Virtual  Visit was conducted, with patient's consent, to reduce the patient's risk of exposure to COVID-19 and provide continuity of care for an established patient. Services were provided through an audio and video synchronous discussion virtually to substitute for in-person clinic visit. S:  Provides update on functioning, notes worsening of symptoms  Pt reports that she has been sick for the past week. Her friend took her to the ER yesterday. Notes that her mom was unsupportive.   Describes that her mom \"throws it in my face\" about taking care of her son, though pt states that it was her mom's idea to take care of him  Describes past dynamics in her relationship with her mom and her response to her mother  Reports ongoing morbid ideation but denies specific plan or intent    O:  MSE:  Appearance    alert, cooperative   Personal Hygiene : appropriately dressed, appropriately groomed and healthy looking  Appetite abnormal: inc  Sleep disturbance Yes, including: non-restful sleep  Fatigue Yes  Loss of pleasure Yes  Impulsive behavior No  Speech    spontaneous, normal rate and normal volume  Mood   depressed   Affect    depressed affect  Thought Content    intact  Thought Process    linear, goal directed and coherent  Associations    logical connections  Insight   fair  Judgment   good  Orientation    oriented to person, place, time, and general circumstances  Memory    recent and remote memory intact  Attention/Concentration    intact  Morbid ideation No  Suicide Assessment    no suicidal ideation      History:    Medications:   Current Outpatient Medications   Medication Sig Dispense Refill    traMADol (ULTRAM) 50 MG tablet Take 1 tablet by mouth every 4 hours as needed for Pain for up to 3 days. Intended supply: 3 days. Take lowest dose possible to manage pain 18 tablet 0    ondansetron (ZOFRAN-ODT) 4 MG disintegrating tablet Take 1 tablet by mouth 3 times daily as needed for Nausea or Vomiting 21 tablet 0    loperamide (RA ANTI-DIARRHEAL) 2 MG capsule Take 1 capsule by mouth 4 times daily as needed for Diarrhea 20 capsule 0    ciprofloxacin (CIPRO) 500 MG tablet Take 1 tablet by mouth 2 times daily for 3 days 6 tablet 0    pregabalin (LYRICA) 25 MG capsule Take 25 mg by mouth 2 times daily.       Blood Pressure Monitoring (B-D ASSURE BPM/AUTO ARM CUFF) MISC USE AS DIRECTED 1 each 0    tiZANidine (ZANAFLEX) 2 MG tablet Take 2 mg by mouth every 6 hours as needed      fluticasone (FLOVENT HFA) 110 MCG/ACT inhaler Inhale 2 puffs into the lungs 2 times daily 1 Inhaler 2    VENTOLIN  (90 Base) MCG/ACT inhaler inhale 1 puffs by mouth EVERY 6 HOURS AS NEEDED FOR WHEEZING OR SHORTNESS OF BREATH 3 Inhaler 3     Current Facility-Administered Medications   Medication Dose Route Frequency Provider Last Rate Last Dose    levonorgestrel (MIRENA) IUD 52 mg 1 each  1 each Intrauterine Once Tj Cohen MD   1 each at 10/27/20 4842       Social History:   Social History     Socioeconomic History    Marital status: Single     Spouse name: Not on file    Number of children: Not on file    Years of education: Not on file    Highest education level: Not on file   Occupational History    Not on file   Social Needs    Financial resource strain: Not on file    Food insecurity     Worry: Not on file     Inability: Not on file   [employer] needs     Medical: Not on file     Non-medical: Not on file   Tobacco Use    Smoking status: Never Smoker    Smokeless tobacco: Never Used   Substance and Sexual Activity    Alcohol use: Not Currently     Alcohol/week: 0.0 standard drinks    Drug use: Yes     Types: Marijuana     Comment: vape daily    Sexual activity: Yes     Partners: Male   Lifestyle    Physical activity     Days per week: Not on file     Minutes per session: Not on file    Stress: Not on file   Relationships    Social connections     Talks on phone: Not on file     Gets together: Not on file     Attends [employer] service: Not on file     Active member of club or organization: Not on file     Attends meetings of clubs or organizations: Not on file     Relationship status: Not on file    Intimate partner violence     Fear of current or ex partner: Not on file     Emotionally abused: Not on file     Physically abused: Not on file     Forced sexual activity: Not on file   Other Topics Concern    Not on file   Social History Narrative    Not on file       TOBACCO:   reports that she has never smoked. She has never used smokeless tobacco.  ETOH:   reports previous alcohol use.     Family History:   Family History   Problem Relation Age of Onset    Depression Mother     Arthritis Mother         TKR    High Blood Pressure Father         No congenital heart problems, no aneurysms.  Diabetes Father         Micheline Enriquez might have had colon cancer she will update me.  Arthritis Maternal Grandmother     Cancer Maternal Grandmother         Unsure    Diabetes Maternal Grandmother     Arthritis Paternal Grandmother     Diabetes Paternal Grandmother     Stroke Paternal Grandmother     Cancer Paternal Grandfather         Colon? ? She's not sure     Arthritis Sister     No Known Problems Brother     Other Son         pyloric stenosis / OR at 10 weeks age         A:  Administered the PHQ9 which indicates a self report of severe symptom distress. Pt would benefit from [facility] services to increase coping skills to provide symptom management/control/relief.        PHQ Scores 12/4/2020 11/18/2020 11/4/2020 10/23/2020 10/14/2020 9/22/2020 9/11/2020   PHQ2 Score 6 4 4 4 4 4 4   PHQ9 Score 25 17 19 18 20 17 19     Interpretation of Total Score Depression Severity: 1-4 = Minimal depression, 5-9 = Mild depression, 10-14 = Moderate depression, 15-19 = Moderately severe depression, 20-27 = Severe depression      Diagnosis:    Persistent Depressive Disorder with Anxious Distress      Diagnosis Date    Arthritis     spine & fingers    Asthma     since childhood    Chronic back pain     Chronic headaches     Depression     Depression     PONV (postoperative nausea and vomiting)     Postpartum depression 9/16/2017    Seizures ([facility] [address]            Plan:  Pt interventions:  Trained in strategies for increasing balanced thinking, Motivational Interviewing to determine importance and readiness for change, Supportive techniques, Emphasized self-care as important for managing overall health and Problem-solving re: communication         Pt Behavioral Change Plan:  Follow up in 2 weeks      Please note this report has been partially produced using speech recognition software  And may cause contain errors related to that system including grammar, punctuation and spelling as well as words and phrases that may seem inappropriate. If there are questions or concerns please feel free to contact me to clarify.

## 2020-12-06 LAB
SARS-COV-2: DETECTED
SOURCE: ABNORMAL

## 2020-12-16 ENCOUNTER — VIRTUAL VISIT (OUTPATIENT)
Dept: BEHAVIORAL/MENTAL HEALTH CLINIC | Age: 30
End: 2020-12-16
Payer: COMMERCIAL

## 2020-12-16 PROCEDURE — 90832 PSYTX W PT 30 MINUTES: CPT | Performed by: PSYCHOLOGIST

## 2020-12-16 ASSESSMENT — PATIENT HEALTH QUESTIONNAIRE - PHQ9
SUM OF ALL RESPONSES TO PHQ QUESTIONS 1-9: 23
7. TROUBLE CONCENTRATING ON THINGS, SUCH AS READING THE NEWSPAPER OR WATCHING TELEVISION: 2
5. POOR APPETITE OR OVEREATING: 3
6. FEELING BAD ABOUT YOURSELF - OR THAT YOU ARE A FAILURE OR HAVE LET YOURSELF OR YOUR FAMILY DOWN: 3
SUM OF ALL RESPONSES TO PHQ QUESTIONS 1-9: 23
4. FEELING TIRED OR HAVING LITTLE ENERGY: 3
9. THOUGHTS THAT YOU WOULD BE BETTER OFF DEAD, OR OF HURTING YOURSELF: 2
8. MOVING OR SPEAKING SO SLOWLY THAT OTHER PEOPLE COULD HAVE NOTICED. OR THE OPPOSITE, BEING SO FIGETY OR RESTLESS THAT YOU HAVE BEEN MOVING AROUND A LOT MORE THAN USUAL: 2
10. IF YOU CHECKED OFF ANY PROBLEMS, HOW DIFFICULT HAVE THESE PROBLEMS MADE IT FOR YOU TO DO YOUR WORK, TAKE CARE OF THINGS AT HOME, OR GET ALONG WITH OTHER PEOPLE: 2
3. TROUBLE FALLING OR STAYING ASLEEP: 3
1. LITTLE INTEREST OR PLEASURE IN DOING THINGS: 2
2. FEELING DOWN, DEPRESSED OR HOPELESS: 3
SUM OF ALL RESPONSES TO PHQ9 QUESTIONS 1 & 2: 5
SUM OF ALL RESPONSES TO PHQ QUESTIONS 1-9: 21

## 2020-12-16 NOTE — PROGRESS NOTES
Behavioral Health Consultation  Mat Roman Psy.D. Psychologist  12/16/20  4:05 PM EST      Time spent with Patient: 30 minutes  This is patient's 13th  [facility] appointment. Reason for Consult:  depression  Referring Provider: Fredrick Tyson MD      Feedback given to PCP. TELEHEALTH EVALUATION -- Audio and/or Visual (During [place] [address] public health emergency)    Due to COVID 19 outbreak, patient's office visit was converted to a virtual visit. Patient was contacted and agreed to proceed with a virtual visit via [employer]. Patient reports that they are located at home. Provider located at home office. The risks and benefits of converting to a virtual visit were discussed in light of the current infectious disease epidemic. Patient also understood that insurance coverage and co-pays are up to their individual insurance plans. Patient provides verbal consent for this visit to be billed to insurance. Pursuant to the emergency declaration under the [facility], [facility]5 waiver authority and the [employer] Act, this Virtual  Visit was conducted, with patient's consent, to reduce the patient's risk of exposure to COVID-19 and provide continuity of care for an established patient. Services were provided through an audio and video synchronous discussion virtually to substitute for in-person clinic visit.     S: Pt reports that she had tested positive for COVID. She is feeling better now. She continues have some stomach upset and no taste or smell. Reports that she has been sleeping a lot more. Reports she had seen a friend prior to diagnosis with COVID, but had not been out much in the meantime. Has not been at work since before Thanksgiving, and will return in the new year. She saw her mom and step-dad yesterday for her birthday and reports that she had a nice time. Reports that she has talked with her father. Describes ongoing upset about his move to [facility]. She reports ongoing morbid ideation but denies SI.     O:  MSE:    Appearance    alert, cooperative   Personal Hygiene : appropriately dressed, appropriately groomed and healthy looking  Appetite abnormal: inc  Sleep disturbance Yes, including: non-restful sleep  Fatigue Yes  Loss of pleasure Yes  Impulsive behavior No  Speech    spontaneous, normal rate and normal volume  Mood   depressed   Affect    depressed affect  Thought Content    intact  Thought Process    linear, goal directed and coherent  Associations    logical connections  Insight   fair  Judgment   good  Orientation    oriented to person, place, time, and general circumstances  Memory    recent and remote memory intact  Attention/Concentration    intact  Morbid ideation No  Suicide Assessment    no suicidal ideation    History:    Medications:   Current Outpatient Medications   Medication Sig Dispense Refill    ondansetron (ZOFRAN-ODT) 4 MG disintegrating tablet Take 1 tablet by mouth 3 times daily as needed for Nausea or Vomiting 21 tablet 0    pregabalin (LYRICA) 25 MG capsule Take 25 mg by mouth 2 times daily.       Blood Pressure Monitoring (B-D ASSURE BPM/AUTO ARM CUFF) MISC USE AS DIRECTED 1 each 0    tiZANidine (ZANAFLEX) 2 MG tablet Take 2 mg by mouth every 6 hours as needed      fluticasone (FLOVENT HFA) 110 MCG/ACT inhaler Inhale 2 puffs into the lungs 2 times daily 1 Inhaler 2  VENTOLIN  (90 Base) MCG/ACT inhaler inhale 1 puffs by mouth EVERY 6 HOURS AS NEEDED FOR WHEEZING OR SHORTNESS OF BREATH 3 Inhaler 3     Current Facility-Administered Medications   Medication Dose Route Frequency Provider Last Rate Last Admin    levonorgestrel (MIRENA) IUD 52 mg 1 each  1 each Intrauterine Once Giles Bernard MD   1 each at 10/27/20 7749       Social History:   Social History     Socioeconomic History    Marital status: Single     Spouse name: Not on file    Number of children: Not on file    Years of education: Not on file    Highest education level: Not on file   Occupational History    Not on file   Social Needs    Financial resource strain: Not on file    Food insecurity     Worry: Not on file     Inability: Not on file   [employer] needs     Medical: Not on file     Non-medical: Not on file   Tobacco Use    Smoking status: Never Smoker    Smokeless tobacco: Never Used   Substance and Sexual Activity    Alcohol use: Not Currently     Alcohol/week: 0.0 standard drinks    Drug use: Yes     Types: Marijuana     Comment: vape daily    Sexual activity: Yes     Partners: Male   Lifestyle    Physical activity     Days per week: Not on file     Minutes per session: Not on file    Stress: Not on file   Relationships    Social connections     Talks on phone: Not on file     Gets together: Not on file     Attends [employer] service: Not on file     Active member of club or organization: Not on file     Attends meetings of clubs or organizations: Not on file     Relationship status: Not on file    Intimate partner violence     Fear of current or ex partner: Not on file     Emotionally abused: Not on file     Physically abused: Not on file     Forced sexual activity: Not on file   Other Topics Concern    Not on file   Social History Narrative    Not on file       TOBACCO:   reports that she has never smoked.  She has never used smokeless tobacco. ETOH:   reports previous alcohol use. Family History:   Family History   Problem Relation Age of Onset    Depression Mother     Arthritis Mother         TKR    High Blood Pressure Father         No congenital heart problems, no aneurysms.  Diabetes Father         Alexander Guzman might have had colon cancer she will update me.  Arthritis Maternal Grandmother     Cancer Maternal Grandmother         Unsure    Diabetes Maternal Grandmother     Arthritis Paternal Grandmother     Diabetes Paternal Grandmother     Stroke Paternal Grandmother     Cancer Paternal Grandfather         Colon? ? She's not sure     Arthritis Sister     No Known Problems Brother     Other Son         pyloric stenosis / OR at 10 weeks age         A:  Administered the PHQ9 which indicates a self report of severe symptom distress. Pt would benefit from [facility] services to increase coping skills to provide symptom management/control/relief.        PHQ Scores 12/16/2020 12/4/2020 11/18/2020 11/4/2020 10/23/2020 10/14/2020 9/22/2020   PHQ2 Score 5 6 4 4 4 4 4   PHQ9 Score 23 25 17 19 18 20 17     Interpretation of Total Score Depression Severity: 1-4 = Minimal depression, 5-9 = Mild depression, 10-14 = Moderate depression, 15-19 = Moderately severe depression, 20-27 = Severe depression      Diagnosis:    Persistent Depressive Disorder with Anxious Distress      Diagnosis Date    Arthritis     spine & fingers    Asthma     since childhood    Chronic back pain     Chronic headaches     Depression     Depression     PONV (postoperative nausea and vomiting)     Postpartum depression 9/16/2017    Seizures ([facility] [address]            Plan:  Pt interventions:  Trained in strategies for increasing balanced thinking, Motivational Interviewing to determine importance and readiness for change, Supportive techniques, Emphasized self-care as important for managing overall health      Pt Behavioral Change Plan:  Follow up in 3 weeks Please note this report has been partially produced using speech recognition software  And may cause contain errors related to that system including grammar, punctuation and spelling as well as words and phrases that may seem inappropriate. If there are questions or concerns please feel free to contact me to clarify.

## 2020-12-21 RX ORDER — DEXAMETHASONE 4 MG/1
TABLET ORAL
Qty: 3 INHALER | Refills: 1 | Status: SHIPPED | OUTPATIENT
Start: 2020-12-21 | End: 2021-05-12 | Stop reason: DRUGHIGH

## 2021-01-06 ENCOUNTER — VIRTUAL VISIT (OUTPATIENT)
Dept: BEHAVIORAL/MENTAL HEALTH CLINIC | Age: 31
End: 2021-01-06
Payer: COMMERCIAL

## 2021-01-06 DIAGNOSIS — F34.1 PERSISTENT DEPRESSIVE DISORDER WITH ANXIOUS DISTRESS, CURRENTLY MODERATE: Primary | ICD-10-CM

## 2021-01-06 PROCEDURE — 90832 PSYTX W PT 30 MINUTES: CPT | Performed by: PSYCHOLOGIST

## 2021-01-06 ASSESSMENT — PATIENT HEALTH QUESTIONNAIRE - PHQ9
5. POOR APPETITE OR OVEREATING: 3
SUM OF ALL RESPONSES TO PHQ QUESTIONS 1-9: 24
10. IF YOU CHECKED OFF ANY PROBLEMS, HOW DIFFICULT HAVE THESE PROBLEMS MADE IT FOR YOU TO DO YOUR WORK, TAKE CARE OF THINGS AT HOME, OR GET ALONG WITH OTHER PEOPLE: 3
6. FEELING BAD ABOUT YOURSELF - OR THAT YOU ARE A FAILURE OR HAVE LET YOURSELF OR YOUR FAMILY DOWN: 3
SUM OF ALL RESPONSES TO PHQ9 QUESTIONS 1 & 2: 6
3. TROUBLE FALLING OR STAYING ASLEEP: 3
9. THOUGHTS THAT YOU WOULD BE BETTER OFF DEAD, OR OF HURTING YOURSELF: 3

## 2021-01-06 NOTE — PROGRESS NOTES
Behavioral Health Consultation  Alice Mirza Psy.D. Psychologist  1/6/21  4:08 PM EST      Time spent with Patient:24 minutes  This is patient's 14th  [facility] appointment. Reason for Consult:  depression  Referring Provider: Kamaljit Lang MD      Feedback given to PCP. TELEHEALTH EVALUATION -- Audio and/or Visual (During VBUDV-75 public health emergency)    Due to COVID 19 outbreak, patient's office visit was converted to a virtual visit. Patient was contacted and agreed to proceed with a virtual visit via [employer] me. Patient reports that they are located at home. Provider located at home office. The risks and benefits of converting to a virtual visit were discussed in light of the current infectious disease epidemic. Patient also understood that insurance coverage and co-pays are up to their individual insurance plans. Patient provides verbal consent for this visit to be billed to insurance. Pursuant to the emergency declaration under the [facility][note 3][facility], [facility] waiver authority and the [employer] and [employer] Act, this Virtual  Visit was conducted, with patient's consent, to reduce the patient's risk of exposure to COVID-19 and provide continuity of care for an established patient. Services were provided through an audio and video synchronous discussion virtually to substitute for in-person clinic visit.       S:  VENTOLIN  (90 Base) MCG/ACT inhaler inhale 1 puffs by mouth EVERY 6 HOURS AS NEEDED FOR WHEEZING OR SHORTNESS OF BREATH 3 Inhaler 3     Current Facility-Administered Medications   Medication Dose Route Frequency Provider Last Rate Last Admin    levonorgestrel (MIRENA) IUD 52 mg 1 each  1 each Intrauterine Once Iram Mane MD   1 each at 10/27/20 3225       Social History:   Social History     Socioeconomic History    Marital status: Single     Spouse name: Not on file    Number of children: Not on file    Years of education: Not on file    Highest education level: Not on file   Occupational History    Not on file   Social Needs    Financial resource strain: Not on file    Food insecurity     Worry: Not on file     Inability: Not on file   [employer] needs     Medical: Not on file     Non-medical: Not on file   Tobacco Use    Smoking status: Never Smoker    Smokeless tobacco: Never Used   Substance and Sexual Activity    Alcohol use: Not Currently     Alcohol/week: 0.0 standard drinks    Drug use: Yes     Types: Marijuana     Comment: vape daily    Sexual activity: Yes     Partners: Male   Lifestyle    Physical activity     Days per week: Not on file     Minutes per session: Not on file    Stress: Not on file   Relationships    Social connections     Talks on phone: Not on file     Gets together: Not on file     Attends Baptism service: Not on file     Active member of club or organization: Not on file     Attends meetings of clubs or organizations: Not on file     Relationship status: Not on file    Intimate partner violence     Fear of current or ex partner: Not on file     Emotionally abused: Not on file     Physically abused: Not on file     Forced sexual activity: Not on file   Other Topics Concern    Not on file   Social History Narrative    Not on file       TOBACCO:   reports that she has never smoked.  She has never used smokeless tobacco. ETOH:   reports previous alcohol use. Family History:   Family History   Problem Relation Age of Onset    Depression Mother     Arthritis Mother         TKR    High Blood Pressure Father         No congenital heart problems, no aneurysms.  Diabetes Father         Bridger Nguyen might have had colon cancer she will update me.  Arthritis Maternal Grandmother     Cancer Maternal Grandmother         Unsure    Diabetes Maternal Grandmother     Arthritis Paternal Grandmother     Diabetes Paternal Grandmother     Stroke Paternal Grandmother     Cancer Paternal Grandfather         Colon? ? She's not sure     Arthritis Sister     No Known Problems Brother     Other Son         pyloric stenosis / OR at 10 weeks age         A:  Administered the PHQ9 which indicates a self report of severe symptom distress. Pt would benefit from [facility] services to increase coping skills to provide symptom management/control/relief.        PHQ Scores 1/6/2021 12/16/2020 12/4/2020 11/18/2020 11/4/2020 10/23/2020 10/14/2020   PHQ2 Score 6 5 6 4 4 4 4   PHQ9 Score 24 23 25 17 19 18 20     Interpretation of Total Score Depression Severity: 1-4 = Minimal depression, 5-9 = Mild depression, 10-14 = Moderate depression, 15-19 = Moderately severe depression, 20-27 = Severe depression      Diagnosis:    Persistent Depressive Disorder with Anxious Distress      Diagnosis Date    Arthritis     spine & fingers    Asthma     since childhood    Chronic back pain     Chronic headaches     Depression     Depression     PONV (postoperative nausea and vomiting)     Postpartum depression 9/16/2017    Seizures ([facility] [address]            Plan:  Pt interventions: Trained in strategies for increasing balanced thinking, Discussed self-care (sleep, nutrition, rewarding activities, social support, exercise), [place]-setting to identify pt's primary goals for [facility] visit / overall health, Supportive techniques, Emphasized self-care as important for managing overall health and Identified maladaptive thoughts      Pt Behavioral Change Plan:  Follow up in 2 weeks      Please note this report has been partially produced using speech recognition software  And may cause contain errors related to that system including grammar, punctuation and spelling as well as words and phrases that may seem inappropriate. If there are questions or concerns please feel free to contact me to clarify.

## 2021-01-13 ENCOUNTER — OFFICE VISIT (OUTPATIENT)
Dept: OBGYN CLINIC | Age: 31
End: 2021-01-13
Payer: COMMERCIAL

## 2021-01-13 VITALS
WEIGHT: 171 LBS | DIASTOLIC BLOOD PRESSURE: 64 MMHG | HEIGHT: 66 IN | BODY MASS INDEX: 27.48 KG/M2 | HEART RATE: 88 BPM | SYSTOLIC BLOOD PRESSURE: 100 MMHG

## 2021-01-13 DIAGNOSIS — Z30.431 IUD CHECK UP: Primary | ICD-10-CM

## 2021-01-13 PROCEDURE — G8484 FLU IMMUNIZE NO ADMIN: HCPCS | Performed by: OBSTETRICS & GYNECOLOGY

## 2021-01-13 PROCEDURE — 99212 OFFICE O/P EST SF 10 MIN: CPT | Performed by: OBSTETRICS & GYNECOLOGY

## 2021-01-13 PROCEDURE — G8427 DOCREV CUR MEDS BY ELIG CLIN: HCPCS | Performed by: OBSTETRICS & GYNECOLOGY

## 2021-01-13 PROCEDURE — 1036F TOBACCO NON-USER: CPT | Performed by: OBSTETRICS & GYNECOLOGY

## 2021-01-13 PROCEDURE — G8417 CALC BMI ABV UP PARAM F/U: HCPCS | Performed by: OBSTETRICS & GYNECOLOGY

## 2021-01-20 ENCOUNTER — VIRTUAL VISIT (OUTPATIENT)
Dept: BEHAVIORAL/MENTAL HEALTH CLINIC | Age: 31
End: 2021-01-20
Payer: COMMERCIAL

## 2021-01-20 DIAGNOSIS — F34.1 PERSISTENT DEPRESSIVE DISORDER WITH ANXIOUS DISTRESS, CURRENTLY MODERATE: Primary | ICD-10-CM

## 2021-01-20 PROCEDURE — 90832 PSYTX W PT 30 MINUTES: CPT | Performed by: PSYCHOLOGIST

## 2021-01-20 ASSESSMENT — PATIENT HEALTH QUESTIONNAIRE - PHQ9
8. MOVING OR SPEAKING SO SLOWLY THAT OTHER PEOPLE COULD HAVE NOTICED. OR THE OPPOSITE, BEING SO FIGETY OR RESTLESS THAT YOU HAVE BEEN MOVING AROUND A LOT MORE THAN USUAL: 1
4. FEELING TIRED OR HAVING LITTLE ENERGY: 3
1. LITTLE INTEREST OR PLEASURE IN DOING THINGS: 2
6. FEELING BAD ABOUT YOURSELF - OR THAT YOU ARE A FAILURE OR HAVE LET YOURSELF OR YOUR FAMILY DOWN: 3
9. THOUGHTS THAT YOU WOULD BE BETTER OFF DEAD, OR OF HURTING YOURSELF: 2
5. POOR APPETITE OR OVEREATING: 2
SUM OF ALL RESPONSES TO PHQ QUESTIONS 1-9: 21
SUM OF ALL RESPONSES TO PHQ QUESTIONS 1-9: 21
3. TROUBLE FALLING OR STAYING ASLEEP: 2
2. FEELING DOWN, DEPRESSED OR HOPELESS: 3

## 2021-01-20 NOTE — PROGRESS NOTES
Behavioral Health Consultation  Jorge Monge Psy.D. Psychologist  1/20/21  4:08 PM EST      Time spent with Patient:24 minutes  This is patient's 15th  [facility] appointment. Reason for Consult:  depression  Referring Provider: Charlotte Enriquez MD      Feedback given to PCP. TELEHEALTH EVALUATION -- Audio and/or Visual (During TWXHQ-11 public health emergency)    Due to COVID 19 outbreak, patient's office visit was converted to a virtual visit. Patient was contacted and agreed to proceed with a virtual visit via [employer] me. Patient reports that they are located at home. Provider located at home office. The risks and benefits of converting to a virtual visit were discussed in light of the current infectious disease epidemic. Patient also understood that insurance coverage and co-pays are up to their individual insurance plans. Patient provides verbal consent for this visit to be billed to insurance. Pursuant to the emergency declaration under the [facility] waiver authority and the [employer] General Act, this Virtual  Visit was conducted, with patient's consent, to reduce the patient's risk of exposure to COVID-19 and provide continuity of care for an established patient. Services were provided through an audio and video synchronous discussion virtually to substitute for in-person clinic visit.       S: Pt reports that she was moved to another building and she is working with one other person; is happy about this move. Pt describes some ongoing anxiety about tattoo (as discussed in last visit). Reports difficulty in relationship with her mother, particularly related to her mother's custody of her son. Notes that their relationship contributes to symptoms of depression (feeling bad about self) and anxiety. Pt shared her perspective about her mom taking custody of her son. Continues to feel some resentment about not having support or being able to live with either of her parents during her pregnancy or after her son's birth. Discussed interactions/communication style with her mother. Pt reports some ongoing morbid ideation. O:  MSE:    Appearance    alert, cooperative   Personal Hygiene : appropriately dressed, appropriately groomed and healthy looking  Appetite abnormal: inc  Sleep disturbance Yes, including: non-restful sleep  Fatigue Yes  Loss of pleasure Yes  Impulsive behavior No  Speech    spontaneous, normal rate and normal volume  Mood   depressed   Affect    depressed affect  Thought Content    intact  Thought Process    linear, goal directed and coherent  Associations    logical connections  Insight   fair  Judgment   good  Orientation    oriented to person, place, time, and general circumstances  Memory    recent and remote memory intact  Attention/Concentration    intact  Morbid ideation No  Suicide Assessment    no suicidal ideation    History:    Medications:   Current Outpatient Medications   Medication Sig Dispense Refill    FLOVENT  MCG/ACT inhaler inhale 2 puffs by mouth and INTO THE LUNGS twice a day 3 Inhaler 1    pregabalin (LYRICA) 25 MG capsule Take 25 mg by mouth 2 times daily.       Blood Pressure Monitoring (B-D ASSURE BPM/AUTO ARM CUFF) MISC USE AS DIRECTED 1 each 0    tiZANidine (ZANAFLEX) 2 MG tablet Take 2 mg by mouth every 6 hours as needed  VENTOLIN  (90 Base) MCG/ACT inhaler inhale 1 puffs by mouth EVERY 6 HOURS AS NEEDED FOR WHEEZING OR SHORTNESS OF BREATH 3 Inhaler 3     Current Facility-Administered Medications   Medication Dose Route Frequency Provider Last Rate Last Admin    levonorgestrel (MIRENA) IUD 52 mg 1 each  1 each Intrauterine Once Gracie Deal MD   1 each at 10/27/20 9988       Social History:   Social History     Socioeconomic History    Marital status: Single     Spouse name: Not on file    Number of children: Not on file    Years of education: Not on file    Highest education level: Not on file   Occupational History    Not on file   Social Needs    Financial resource strain: Not on file    Food insecurity     Worry: Not on file     Inability: Not on file   [employer] needs     Medical: Not on file     Non-medical: Not on file   Tobacco Use    Smoking status: Never Smoker    Smokeless tobacco: Never Used   Substance and Sexual Activity    Alcohol use: Not Currently     Alcohol/week: 0.0 standard drinks    Drug use: Yes     Types: Marijuana     Comment: vape daily    Sexual activity: Yes     Partners: Male   Lifestyle    Physical activity     Days per week: Not on file     Minutes per session: Not on file    Stress: Not on file   Relationships    Social connections     Talks on phone: Not on file     Gets together: Not on file     Attends Jew service: Not on file     Active member of club or organization: Not on file     Attends meetings of clubs or organizations: Not on file     Relationship status: Not on file    Intimate partner violence     Fear of current or ex partner: Not on file     Emotionally abused: Not on file     Physically abused: Not on file     Forced sexual activity: Not on file   Other Topics Concern    Not on file   Social History Narrative    Not on file       TOBACCO:   reports that she has never smoked.  She has never used smokeless tobacco. ETOH:   reports previous alcohol use. Family History:   Family History   Problem Relation Age of Onset    Depression Mother     Arthritis Mother         TKR    High Blood Pressure Father         No congenital heart problems, no aneurysms.  Diabetes Father         Kody Hannah might have had colon cancer she will update me.  Arthritis Maternal Grandmother     Cancer Maternal Grandmother         Unsure    Diabetes Maternal Grandmother     Arthritis Paternal Grandmother     Diabetes Paternal Grandmother     Stroke Paternal Grandmother     Cancer Paternal Grandfather         Colon? ? She's not sure     Arthritis Sister     No Known Problems Brother     Other Son         pyloric stenosis / OR at 10 weeks age         A:  Administered the PHQ9 which indicates a self report of severe symptom distress. Pt would benefit from [facility] services to increase coping skills to provide symptom management/control/relief.        PHQ Scores 1/20/2021 1/6/2021 12/16/2020 12/4/2020 11/18/2020 11/4/2020 10/23/2020   PHQ2 Score 5 6 5 6 4 4 4   PHQ9 Score 21 24 23 25 17 19 18     Interpretation of Total Score Depression Severity: 1-4 = Minimal depression, 5-9 = Mild depression, 10-14 = Moderate depression, 15-19 = Moderately severe depression, 20-27 = Severe depression      Diagnosis:    Persistent Depressive Disorder with Anxious Distress      Diagnosis Date    Arthritis     spine & fingers    Asthma     since childhood    Chronic back pain     Chronic headaches     Depression     Depression     PONV (postoperative nausea and vomiting)     Postpartum depression 9/16/2017    Seizures ([facility] [address]            Plan:  Pt interventions: Trained in strategies for increasing balanced thinking, Discussed self-care (sleep, nutrition, rewarding activities, social support, exercise), [place]-setting to identify pt's primary goals for [facility] visit / overall health, Supportive techniques, Emphasized self-care as important for managing overall health and Identified maladaptive thoughts      Pt Behavioral Change Plan:  Follow up in 2 weeks      Please note this report has been partially produced using speech recognition software  And may cause contain errors related to that system including grammar, punctuation and spelling as well as words and phrases that may seem inappropriate. If there are questions or concerns please feel free to contact me to clarify.

## 2021-01-25 NOTE — PROGRESS NOTES
 Arthritis Paternal Grandmother     Diabetes Paternal Grandmother     Stroke Paternal Grandmother     Cancer Paternal Grandfather         Colon? ? She's not sure     Arthritis Sister     No Known Problems Brother     Other Son         pyloric stenosis / OR at 10 weeks age     Social History     Socioeconomic History    Marital status: Single     Spouse name: Not on file    Number of children: Not on file    Years of education: Not on file    Highest education level: Not on file   Occupational History    Not on file   Social Needs    Financial resource strain: Not on file    Food insecurity     Worry: Not on file     Inability: Not on file   [employer] needs     Medical: Not on file     Non-medical: Not on file   Tobacco Use    Smoking status: Never Smoker    Smokeless tobacco: Never Used   Substance and Sexual Activity    Alcohol use: Not Currently     Alcohol/week: 0.0 standard drinks    Drug use: Yes     Types: Marijuana     Comment: vape daily    Sexual activity: Yes     Partners: Male   Lifestyle    Physical activity     Days per week: Not on file     Minutes per session: Not on file    Stress: Not on file   Relationships    Social connections     Talks on phone: Not on file     Gets together: Not on file     Attends Judaism service: Not on file     Active member of club or organization: Not on file     Attends meetings of clubs or organizations: Not on file     Relationship status: Not on file    Intimate partner violence     Fear of current or ex partner: Not on file     Emotionally abused: Not on file     Physically abused: Not on file     Forced sexual activity: Not on file   Other Topics Concern    Not on file   Social History Narrative    Not on file       Contraceptive method:  IUD    Patient's medications, allergies, past medical, surgical, social and family histories were reviewed and updated as appropriate.     Review of Systems  As per chief complaint All other systems reviewed and are negative. Physical Exam:  Vitals:  /64 (Site: Left Upper Arm, Position: Sitting, Cuff Size: Medium Adult)   Pulse 88   Ht 5' 6\" (1.676 m)   Wt 171 lb (77.6 kg)   BMI 27.60 kg/m²   Constitutional: She is oriented to person, place, and time and well-developed, well-nourished, and in no distress. HENT:   Head: Normocephalic and atraumatic. Eyes: Conjunctivae and EOM are normal.   Neck: Normal range of motion. Genitourinary: Vagina normal and cervix normal.   Genitourinary Comments: IUD string seen at cervical OS confirming IUD is in place. Musculoskeletal: Normal range of motion. Neurological: She is alert and oriented to person, place, and time. Skin: Skin is dry. Psychiatric: Mood and affect normal.       Assessment:      Diagnosis Orders   1. IUD check up         Plan:     Patient is doing well with the IUD    Encouraged to follow on next scheduled annual exam visit or as needed for any complaints related or unrelated to the IUD. No orders of the defined types were placed in this encounter. No orders of the defined types were placed in this encounter. Follow Up:  No follow-ups on file.         James Patterson MD

## 2021-02-09 ENCOUNTER — VIRTUAL VISIT (OUTPATIENT)
Dept: BEHAVIORAL/MENTAL HEALTH CLINIC | Age: 31
End: 2021-02-09
Payer: COMMERCIAL

## 2021-02-09 DIAGNOSIS — F34.1 PERSISTENT DEPRESSIVE DISORDER WITH ANXIOUS DISTRESS, CURRENTLY MODERATE: Primary | ICD-10-CM

## 2021-02-09 PROCEDURE — 90832 PSYTX W PT 30 MINUTES: CPT | Performed by: PSYCHOLOGIST

## 2021-02-09 ASSESSMENT — PATIENT HEALTH QUESTIONNAIRE - PHQ9
8. MOVING OR SPEAKING SO SLOWLY THAT OTHER PEOPLE COULD HAVE NOTICED. OR THE OPPOSITE, BEING SO FIGETY OR RESTLESS THAT YOU HAVE BEEN MOVING AROUND A LOT MORE THAN USUAL: 2
3. TROUBLE FALLING OR STAYING ASLEEP: 1
10. IF YOU CHECKED OFF ANY PROBLEMS, HOW DIFFICULT HAVE THESE PROBLEMS MADE IT FOR YOU TO DO YOUR WORK, TAKE CARE OF THINGS AT HOME, OR GET ALONG WITH OTHER PEOPLE: 2
SUM OF ALL RESPONSES TO PHQ QUESTIONS 1-9: 21

## 2021-02-09 NOTE — PROGRESS NOTES
Pt reports that things have not been much different. Pt reports that work has been somewhat more stressful because her supervisor has been out. Pt reports feeling upset with her mom because she does not seem to understand or care about her perspective. Pt discussed strain in relationships with her parents. She reports recent conflict with her father; noting that they do not see his move from the same perspective. Pt discussed her relationship with her son, noting that she feels her step-father seems to be the \"fun one\" and she feels this is impacting her relationship with her son. She also notes difficulty connecting with her son at times. O:  MSE:    Appearance    alert, cooperative   Personal Hygiene : appropriately dressed, appropriately groomed and healthy looking  Appetite abnormal: inc  Sleep disturbance Yes, including: non-restful sleep  Fatigue Yes  Loss of pleasure Yes  Impulsive behavior No  Speech    spontaneous, normal rate and normal volume  Mood   depressed   Affect    depressed affect  Thought Content    intact  Thought Process    linear, goal directed and coherent  Associations    logical connections  Insight   fair  Judgment   good  Orientation    oriented to person, place, time, and general circumstances  Memory    recent and remote memory intact  Attention/Concentration    intact  Morbid ideation No  Suicide Assessment    no suicidal ideation    History:    Medications:   Current Outpatient Medications   Medication Sig Dispense Refill    FLOVENT  MCG/ACT inhaler inhale 2 puffs by mouth and INTO THE LUNGS twice a day 3 Inhaler 1    pregabalin (LYRICA) 25 MG capsule Take 25 mg by mouth 2 times daily.       Blood Pressure Monitoring (B-D ASSURE BPM/AUTO ARM CUFF) MISC USE AS DIRECTED 1 each 0    tiZANidine (ZANAFLEX) 2 MG tablet Take 2 mg by mouth every 6 hours as needed  VENTOLIN  (90 Base) MCG/ACT inhaler inhale 1 puffs by mouth EVERY 6 HOURS AS NEEDED FOR WHEEZING OR SHORTNESS OF BREATH 3 Inhaler 3     Current Facility-Administered Medications   Medication Dose Route Frequency Provider Last Rate Last Admin    levonorgestrel (MIRENA) IUD 52 mg 1 each  1 each Intrauterine Once James Patterson MD   1 each at 10/27/20 3630       Social History:   Social History     Socioeconomic History    Marital status: Single     Spouse name: Not on file    Number of children: Not on file    Years of education: Not on file    Highest education level: Not on file   Occupational History    Not on file   Social Needs    Financial resource strain: Not on file    Food insecurity     Worry: Not on file     Inability: Not on file   [employer] needs     Medical: Not on file     Non-medical: Not on file   Tobacco Use    Smoking status: Never Smoker    Smokeless tobacco: Never Used   Substance and Sexual Activity    Alcohol use: Not Currently     Alcohol/week: 0.0 standard drinks    Drug use: Yes     Types: Marijuana     Comment: vape daily    Sexual activity: Yes     Partners: Male   Lifestyle    Physical activity     Days per week: Not on file     Minutes per session: Not on file    Stress: Not on file   Relationships    Social connections     Talks on phone: Not on file     Gets together: Not on file     Attends Restorationist service: Not on file     Active member of club or organization: Not on file     Attends meetings of clubs or organizations: Not on file     Relationship status: Not on file    Intimate partner violence     Fear of current or ex partner: Not on file     Emotionally abused: Not on file     Physically abused: Not on file     Forced sexual activity: Not on file   Other Topics Concern    Not on file   Social History Narrative    Not on file       TOBACCO:   reports that she has never smoked.  She has never used smokeless tobacco. ETOH:   reports previous alcohol use. Family History:   Family History   Problem Relation Age of Onset    Depression Mother     Arthritis Mother         TKR    High Blood Pressure Father         No congenital heart problems, no aneurysms.  Diabetes Father         Haris Terry might have had colon cancer she will update me.  Arthritis Maternal Grandmother     Cancer Maternal Grandmother         Unsure    Diabetes Maternal Grandmother     Arthritis Paternal Grandmother     Diabetes Paternal Grandmother     Stroke Paternal Grandmother     Cancer Paternal Grandfather         Colon? ? She's not sure     Arthritis Sister     No Known Problems Brother     Other Son         pyloric stenosis / OR at 10 weeks age         A:  Administered the PHQ9 which indicates a self report of severe symptom distress. Pt would benefit from [facility] services to increase coping skills to provide symptom management/control/relief.        PHQ Scores 2/9/2021 1/20/2021 1/6/2021 12/16/2020 12/4/2020 11/18/2020 11/4/2020   PHQ2 Score 5 5 6 5 6 4 4   PHQ9 Score 21 21 24 23 25 17 19     Interpretation of Total Score Depression Severity: 1-4 = Minimal depression, 5-9 = Mild depression, 10-14 = Moderate depression, 15-19 = Moderately severe depression, 20-27 = Severe depression      Diagnosis:    Persistent Depressive Disorder with Anxious Distress      Diagnosis Date    Arthritis     spine & fingers    Asthma     since childhood    Chronic back pain     Chronic headaches     Depression     Depression     PONV (postoperative nausea and vomiting)     Postpartum depression 9/16/2017    Seizures ([facility] [address]            Plan:  Pt interventions: Trained in strategies for increasing balanced thinking, Discussed self-care (sleep, nutrition, rewarding activities, social support, exercise), Discussed effective parenting skills, [place]-setting to identify pt's primary goals for [facility] visit / overall health, Supportive techniques, Emphasized self-care as important for managing overall health and Identified maladaptive thoughts      Pt Behavioral Change Plan:  Follow up in 2 weeks      Please note this report has been partially produced using speech recognition software  And may cause contain errors related to that system including grammar, punctuation and spelling as well as words and phrases that may seem inappropriate. If there are questions or concerns please feel free to contact me to clarify.

## 2021-02-24 ENCOUNTER — VIRTUAL VISIT (OUTPATIENT)
Dept: BEHAVIORAL/MENTAL HEALTH CLINIC | Age: 31
End: 2021-02-24
Payer: COMMERCIAL

## 2021-02-24 DIAGNOSIS — F34.1 PERSISTENT DEPRESSIVE DISORDER WITH ANXIOUS DISTRESS, CURRENTLY MODERATE: Primary | ICD-10-CM

## 2021-02-24 PROCEDURE — 90832 PSYTX W PT 30 MINUTES: CPT | Performed by: PSYCHOLOGIST

## 2021-02-24 ASSESSMENT — PATIENT HEALTH QUESTIONNAIRE - PHQ9
6. FEELING BAD ABOUT YOURSELF - OR THAT YOU ARE A FAILURE OR HAVE LET YOURSELF OR YOUR FAMILY DOWN: 3
5. POOR APPETITE OR OVEREATING: 2
SUM OF ALL RESPONSES TO PHQ9 QUESTIONS 1 & 2: 5

## 2021-02-24 NOTE — PROGRESS NOTES
her parents, including when she was depressed. Did a [employer] event over the weekend with some friends through the game and enjoyed this. Describes feeling stressed at times with son due to his behavior and does not feel a close sense of connection with him. Reports morbid ideation, but no SI      O:  MSE:    Appearance    alert, cooperative   Personal Hygiene : appropriately dressed, appropriately groomed and healthy looking  Appetite abnormal: inc  Sleep disturbance Yes, including: non-restful sleep  Fatigue Yes  Loss of pleasure Yes  Impulsive behavior No  Speech    spontaneous, normal rate and normal volume  Mood   depressed   Affect    depressed affect  Thought Content    intact  Thought Process    linear, goal directed and coherent  Associations    logical connections  Insight   fair  Judgment   good  Orientation    oriented to person, place, time, and general circumstances  Memory    recent and remote memory intact  Attention/Concentration    intact  Morbid ideation No  Suicide Assessment    no suicidal ideation    History:    Medications:   Current Outpatient Medications   Medication Sig Dispense Refill    FLOVENT  MCG/ACT inhaler inhale 2 puffs by mouth and INTO THE LUNGS twice a day 3 Inhaler 1    pregabalin (LYRICA) 25 MG capsule Take 25 mg by mouth 2 times daily.       Blood Pressure Monitoring (B-D ASSURE BPM/AUTO ARM CUFF) MISC USE AS DIRECTED 1 each 0    tiZANidine (ZANAFLEX) 2 MG tablet Take 2 mg by mouth every 6 hours as needed      VENTOLIN  (90 Base) MCG/ACT inhaler inhale 1 puffs by mouth EVERY 6 HOURS AS NEEDED FOR WHEEZING OR SHORTNESS OF BREATH 3 Inhaler 3     Current Facility-Administered Medications   Medication Dose Route Frequency Provider Last Rate Last Admin    levonorgestrel (MIRENA) IUD 52 mg 1 each  1 each Intrauterine Once Zeke Shields MD   1 each at 10/27/20 0124       Social History:   Social History     Socioeconomic History    Marital status: Single Spouse name: Not on file    Number of children: Not on file    Years of education: Not on file    Highest education level: Not on file   Occupational History    Not on file   Social Needs    Financial resource strain: Not on file    Food insecurity     Worry: Not on file     Inability: Not on file    Transportation needs     Medical: Not on file     Non-medical: Not on file   Tobacco Use    Smoking status: Never Smoker    Smokeless tobacco: Never Used   Substance and Sexual Activity    Alcohol use: Not Currently     Alcohol/week: 0.0 standard drinks    Drug use: Yes     Types: Marijuana     Comment: vape daily    Sexual activity: Yes     Partners: Male   Lifestyle    Physical activity     Days per week: Not on file     Minutes per session: Not on file    Stress: Not on file   Relationships    Social connections     Talks on phone: Not on file     Gets together: Not on file     Attends Amish service: Not on file     Active member of club or organization: Not on file     Attends meetings of clubs or organizations: Not on file     Relationship status: Not on file    Intimate partner violence     Fear of current or ex partner: Not on file     Emotionally abused: Not on file     Physically abused: Not on file     Forced sexual activity: Not on file   Other Topics Concern    Not on file   Social History Narrative    Not on file       TOBACCO:   reports that she has never smoked. She has never used smokeless tobacco.  ETOH:   reports previous alcohol use. Family History:   Family History   Problem Relation Age of Onset    Depression Mother     Arthritis Mother         TKR    High Blood Pressure Father         No congenital heart problems, no aneurysms.  Diabetes Father         Martha Square might have had colon cancer she will update me.      Arthritis Maternal Grandmother     Cancer Maternal Grandmother         Unsure    Diabetes Maternal Grandmother     Arthritis Paternal Grandmother     Diabetes Paternal Grandmother     Stroke Paternal Grandmother     Cancer Paternal Grandfather         Colon? ? She's not sure     Arthritis Sister     No Known Problems Brother     Other Son         pyloric stenosis / OR at 10 weeks age         A:  Administered the PHQ9 which indicates a self report of severe symptom distress. Pt would benefit from [facility] services to increase coping skills to provide symptom management/control/relief. PHQ Scores 2/9/2021 1/20/2021 1/6/2021 12/16/2020 12/4/2020 11/18/2020 11/4/2020   PHQ2 Score 5 5 6 5 6 4 4   PHQ9 Score 21 21 24 23 25 17 19     Interpretation of Total Score Depression Severity: 1-4 = Minimal depression, 5-9 = Mild depression, 10-14 = Moderate depression, 15-19 = Moderately severe depression, 20-27 = Severe depression      Diagnosis:    Persistent Depressive Disorder with Anxious Distress      Diagnosis Date    Arthritis     spine & fingers    Asthma     since childhood    Chronic back pain     Chronic headaches     Depression     Depression     PONV (postoperative nausea and vomiting)     Postpartum depression 9/16/2017    Seizures ([facility] [address]            Plan:  Pt interventions:  Trained in strategies for increasing balanced thinking, Discussed self-care (sleep, nutrition, rewarding activities, social support, exercise), Discussed effective parenting skills, [place]-setting to identify pt's primary goals for [facility] visit / overall health, Supportive techniques, Emphasized self-care as important for managing overall health and Identified maladaptive thoughts      Pt Behavioral Change Plan:  Follow up in 23weeks      Please note this report has been partially produced using speech recognition software  And may cause contain errors related to that system including grammar, punctuation and spelling as well as words and phrases that may seem inappropriate. If there are questions or concerns please feel free to contact me to clarify.

## 2021-03-23 ENCOUNTER — VIRTUAL VISIT (OUTPATIENT)
Dept: BEHAVIORAL/MENTAL HEALTH CLINIC | Age: 31
End: 2021-03-23
Payer: COMMERCIAL

## 2021-03-23 DIAGNOSIS — F34.1 PERSISTENT DEPRESSIVE DISORDER WITH ANXIOUS DISTRESS, CURRENTLY MODERATE: Primary | ICD-10-CM

## 2021-03-23 PROCEDURE — 90832 PSYTX W PT 30 MINUTES: CPT | Performed by: PSYCHOLOGIST

## 2021-03-23 ASSESSMENT — PATIENT HEALTH QUESTIONNAIRE - PHQ9
SUM OF ALL RESPONSES TO PHQ9 QUESTIONS 1 & 2: 4
7. TROUBLE CONCENTRATING ON THINGS, SUCH AS READING THE NEWSPAPER OR WATCHING TELEVISION: 2
6. FEELING BAD ABOUT YOURSELF - OR THAT YOU ARE A FAILURE OR HAVE LET YOURSELF OR YOUR FAMILY DOWN: 2
10. IF YOU CHECKED OFF ANY PROBLEMS, HOW DIFFICULT HAVE THESE PROBLEMS MADE IT FOR YOU TO DO YOUR WORK, TAKE CARE OF THINGS AT HOME, OR GET ALONG WITH OTHER PEOPLE: 2
2. FEELING DOWN, DEPRESSED OR HOPELESS: 3
SUM OF ALL RESPONSES TO PHQ QUESTIONS 1-9: 18
3. TROUBLE FALLING OR STAYING ASLEEP: 2

## 2021-03-23 NOTE — PROGRESS NOTES
Behavioral Health Consultation  Lakeisha Farah Psy.D. Psychologist  3/23/21  2:04 PM EST      Time spent with Patient:  25 minutes  This is patient's 18th  [facility] appointment. Reason for Consult:  depression  Referring Provider: Ana Tatum MD      Feedback given to PCP. TELEHEALTH EVALUATION -- Audio and/or Visual (During UASJZ-75 public health emergency)    Due to COVID 19 outbreak, patient's office visit was converted to a virtual visit. Patient was contacted and agreed to proceed with a virtual visit via [employer] me. Patient reports that they are located at home. Provider located at home office. The risks and benefits of converting to a virtual visit were discussed in light of the current infectious disease epidemic. Patient also understood that insurance coverage and co-pays are up to their individual insurance plans. Patient provides verbal consent for this visit to be billed to insurance. Pursuant to the emergency declaration under the [facility] waiver authority and the [employer] and [employer] Act, this Virtual  Visit was conducted, with patient's consent, to reduce the patient's risk of exposure to COVID-19 and provide continuity of care for an established patient. Services were provided through an audio and video synchronous discussion virtually to substitute for in-person clinic visit. S:  Pt reports ongoing thoughts about \"not wanting to be here. \"  Pt shared a general sense of unhappiness with situations in her life and ongoing anger/resentment that continues to bother her frequently. Pt explains, in relation to this, that about 10 years ago, her parents threw out half of her stuff. Pt reports that at the time she was living with her ex-best friend. Pt describes some of the situations that led to her having to move out.  Pt shared perspective on her parents actions and difficulty coming to terms with it as well as her sense that they disregard her feelings about it. Pt shared past negative experiences in friendships. Notes that she is now more selective in her friendships and how she is treated. O:  MSE:    Appearance    alert, cooperative   Personal Hygiene : appropriately dressed, appropriately groomed and healthy looking  Appetite abnormal: inc  Sleep disturbance Yes, including: non-restful sleep  Fatigue Yes  Loss of pleasure Yes  Impulsive behavior No  Speech    spontaneous, normal rate and normal volume  Mood   depressed   Affect    depressed affect  Thought Content    intact  Thought Process    linear, goal directed and coherent  Associations    logical connections  Insight   fair  Judgment   good  Orientation    oriented to person, place, time, and general circumstances  Memory    recent and remote memory intact  Attention/Concentration    intact  Morbid ideation No  Suicide Assessment    no suicidal ideation    History:    Medications:   Current Outpatient Medications   Medication Sig Dispense Refill    FLOVENT  MCG/ACT inhaler inhale 2 puffs by mouth and INTO THE LUNGS twice a day 3 Inhaler 1    pregabalin (LYRICA) 25 MG capsule Take 25 mg by mouth 2 times daily.       Blood Pressure Monitoring (B-D ASSURE BPM/AUTO ARM CUFF) MISC USE AS DIRECTED 1 each 0    tiZANidine (ZANAFLEX) 2 MG tablet Take 2 mg by mouth every 6 hours as needed      VENTOLIN  (90 Base) MCG/ACT inhaler inhale 1 puffs by mouth EVERY 6 HOURS AS NEEDED FOR WHEEZING OR SHORTNESS OF BREATH 3 Inhaler 3     Current Facility-Administered Medications   Medication Dose Route Frequency Provider Last Rate Last Admin    levonorgestrel (MIRENA) IUD 52 mg 1 each  1 each Intrauterine Once Parag Davis MD   1 each at 10/27/20 1624       Social History:   Social History     Socioeconomic History    Marital status: Single     Spouse name: Not on file    Number of children: Not on file    Years of education: Not on file    Highest education level: Not on file   Occupational History    Not on file   Social Needs    Financial resource strain: Not on file    Food insecurity     Worry: Not on file     Inability: Not on file    Transportation needs     Medical: Not on file     Non-medical: Not on file   Tobacco Use    Smoking status: Never Smoker    Smokeless tobacco: Never Used   Substance and Sexual Activity    Alcohol use: Not Currently     Alcohol/week: 0.0 standard drinks    Drug use: Yes     Types: Marijuana     Comment: vape daily    Sexual activity: Yes     Partners: Male   Lifestyle    Physical activity     Days per week: Not on file     Minutes per session: Not on file    Stress: Not on file   Relationships    Social connections     Talks on phone: Not on file     Gets together: Not on file     Attends [employer] service: Not on file     Active member of club or organization: Not on file     Attends meetings of clubs or organizations: Not on file     Relationship status: Not on file    Intimate partner violence     Fear of current or ex partner: Not on file     Emotionally abused: Not on file     Physically abused: Not on file     Forced sexual activity: Not on file   Other Topics Concern    Not on file   Social History Narrative    Not on file       TOBACCO:   reports that she has never smoked. She has never used smokeless tobacco.  ETOH:   reports previous alcohol use. Family History:   Family History   Problem Relation Age of Onset    Depression Mother     Arthritis Mother         TKR    High Blood Pressure Father         No congenital heart problems, no aneurysms.  Diabetes Father         Uvaldo Nyhan might have had colon cancer she will update me.      Arthritis Maternal Grandmother     Cancer Maternal Grandmother         Unsure    Diabetes Maternal Grandmother     Arthritis Paternal Grandmother     Diabetes Paternal Grandmother     Stroke Paternal Grandmother     Cancer Paternal Grandfather         Colon? ? She's not sure     Arthritis Sister     No Known Problems Brother     Other Son         pyloric stenosis / OR at 10 weeks age         A:  Administered the PHQ9 which indicates a self report of severe symptom distress. Pt would benefit from [facility] services to increase coping skills to provide symptom management/control/relief. PHQ Scores 2/24/2021 2/9/2021 1/20/2021 1/6/2021 12/16/2020 12/4/2020 11/18/2020   PHQ2 Score 5 5 5 6 5 6 4   PHQ9 Score 22 21 21 24 23 25 17     Interpretation of Total Score Depression Severity: 1-4 = Minimal depression, 5-9 = Mild depression, 10-14 = Moderate depression, 15-19 = Moderately severe depression, 20-27 = Severe depression      Diagnosis:    Persistent Depressive Disorder with Anxious Distress      Diagnosis Date    Arthritis     spine & fingers    Asthma     since childhood    Chronic back pain     Chronic headaches     Depression     Depression     PONV (postoperative nausea and vomiting)     Postpartum depression 9/16/2017    Seizures ([facility] [address]            Plan:  Pt interventions:  Trained in strategies for increasing balanced thinking, Discussed self-care (sleep, nutrition, rewarding activities, social support, exercise), Discussed effective parenting skills, [place]-setting to identify pt's primary goals for [facility] visit / overall health, Supportive techniques, Emphasized self-care as important for managing overall health and Identified maladaptive thoughts      Pt Behavioral Change Plan:  Follow up in 2 weeks      Please note this report has been partially produced using speech recognition software  And may cause contain errors related to that system including grammar, punctuation and spelling as well as words and phrases that may seem inappropriate. If there are questions or concerns please feel free to contact me to clarify.

## 2021-04-08 ENCOUNTER — VIRTUAL VISIT (OUTPATIENT)
Dept: BEHAVIORAL/MENTAL HEALTH CLINIC | Age: 31
End: 2021-04-08
Payer: COMMERCIAL

## 2021-04-08 DIAGNOSIS — F34.1 PERSISTENT DEPRESSIVE DISORDER WITH ANXIOUS DISTRESS, CURRENTLY MODERATE: Primary | ICD-10-CM

## 2021-04-08 PROCEDURE — 1036F TOBACCO NON-USER: CPT | Performed by: PSYCHOLOGIST

## 2021-04-08 PROCEDURE — 90832 PSYTX W PT 30 MINUTES: CPT | Performed by: PSYCHOLOGIST

## 2021-04-08 ASSESSMENT — PATIENT HEALTH QUESTIONNAIRE - PHQ9
SUM OF ALL RESPONSES TO PHQ QUESTIONS 1-9: 18
SUM OF ALL RESPONSES TO PHQ QUESTIONS 1-9: 16
2. FEELING DOWN, DEPRESSED OR HOPELESS: 3
SUM OF ALL RESPONSES TO PHQ9 QUESTIONS 1 & 2: 5
4. FEELING TIRED OR HAVING LITTLE ENERGY: 2
SUM OF ALL RESPONSES TO PHQ QUESTIONS 1-9: 18
1. LITTLE INTEREST OR PLEASURE IN DOING THINGS: 2
7. TROUBLE CONCENTRATING ON THINGS, SUCH AS READING THE NEWSPAPER OR WATCHING TELEVISION: 2

## 2021-04-08 NOTE — PROGRESS NOTES
frustration and a sense that she has no control over the situation. Pt shared perspective regarding her interactions/relationship with stepparents and step-sister. Discussed ways of resolving issue with mom. Pt reports ongoing morbid ideation, but without specific plan or intent to self-harm. O:  MSE:    Appearance    alert, cooperative   Personal Hygiene : appropriately dressed, appropriately groomed and healthy looking  Appetite abnormal: inc  Sleep disturbance Yes, including: non-restful sleep  Fatigue Yes  Loss of pleasure Yes  Impulsive behavior No  Speech    spontaneous, normal rate and normal volume  Mood   depressed   Affect    depressed affect  Thought Content    intact  Thought Process    linear, goal directed and coherent  Associations    logical connections  Insight   fair  Judgment   good  Orientation    oriented to person, place, time, and general circumstances  Memory    recent and remote memory intact  Attention/Concentration    intact  Morbid ideation yes  Suicide Assessment    no suicidal ideation    History:    Medications:   Current Outpatient Medications   Medication Sig Dispense Refill    FLOVENT  MCG/ACT inhaler inhale 2 puffs by mouth and INTO THE LUNGS twice a day 3 Inhaler 1    pregabalin (LYRICA) 25 MG capsule Take 25 mg by mouth 2 times daily.       Blood Pressure Monitoring (B-D ASSURE BPM/AUTO ARM CUFF) MISC USE AS DIRECTED 1 each 0    tiZANidine (ZANAFLEX) 2 MG tablet Take 2 mg by mouth every 6 hours as needed      VENTOLIN  (90 Base) MCG/ACT inhaler inhale 1 puffs by mouth EVERY 6 HOURS AS NEEDED FOR WHEEZING OR SHORTNESS OF BREATH 3 Inhaler 3     Current Facility-Administered Medications   Medication Dose Route Frequency Provider Last Rate Last Admin    levonorgestrel (MIRENA) IUD 52 mg 1 each  1 each Intrauterine Once Jess Barnett MD   1 each at 10/27/20 1448       Social History:   Social History     Socioeconomic History    Marital status: Single Spouse name: Not on file    Number of children: Not on file    Years of education: Not on file    Highest education level: Not on file   Occupational History    Not on file   Social Needs    Financial resource strain: Not on file    Food insecurity     Worry: Not on file     Inability: Not on file    Transportation needs     Medical: Not on file     Non-medical: Not on file   Tobacco Use    Smoking status: Never Smoker    Smokeless tobacco: Never Used   Substance and Sexual Activity    Alcohol use: Not Currently     Alcohol/week: 0.0 standard drinks    Drug use: Yes     Types: Marijuana     Comment: vape daily    Sexual activity: Yes     Partners: Male   Lifestyle    Physical activity     Days per week: Not on file     Minutes per session: Not on file    Stress: Not on file   Relationships    Social connections     Talks on phone: Not on file     Gets together: Not on file     Attends [employer]ostalism service: Not on file     Active member of club or organization: Not on file     Attends meetings of clubs or organizations: Not on file     Relationship status: Not on file    Intimate partner violence     Fear of current or ex partner: Not on file     Emotionally abused: Not on file     Physically abused: Not on file     Forced sexual activity: Not on file   Other Topics Concern    Not on file   Social History Narrative    Not on file       TOBACCO:   reports that she has never smoked. She has never used smokeless tobacco.  ETOH:   reports previous alcohol use. Family History:   Family History   Problem Relation Age of Onset    Depression Mother     Arthritis Mother         TKR    High Blood Pressure Father         No congenital heart problems, no aneurysms.  Diabetes Father         Jayashree Magana might have had colon cancer she will update me.      Arthritis Maternal Grandmother     Cancer Maternal Grandmother         Unsure    Diabetes Maternal Grandmother     Arthritis Paternal Grandmother     Diabetes Paternal Grandmother     Stroke Paternal Grandmother     Cancer Paternal Grandfather         Colon? ? She's not sure     Arthritis Sister     No Known Problems Brother     Other Son         pyloric stenosis / OR at 10 weeks age         A:  Administered the PHQ9 which indicates a self report of severe symptom distress. Pt would benefit from [facility] services to increase coping skills to provide symptom management/control/relief. PHQ Scores 4/8/2021 3/23/2021 2/24/2021 2/9/2021 1/20/2021 1/6/2021 12/16/2020   PHQ2 Score 5 4 5 5 5 6 5   PHQ9 Score 18 18 22 21 21 24 23     Interpretation of Total Score Depression Severity: 1-4 = Minimal depression, 5-9 = Mild depression, 10-14 = Moderate depression, 15-19 = Moderately severe depression, 20-27 = Severe depression      Diagnosis:    Persistent Depressive Disorder with Anxious Distress      Diagnosis Date    Arthritis     spine & fingers    Asthma     since childhood    Chronic back pain     Chronic headaches     Depression     Depression     PONV (postoperative nausea and vomiting)     Postpartum depression 9/16/2017    Seizures ([facility] [address]            Plan:  Pt interventions:  Trained in strategies for increasing balanced thinking, Discussed self-care (sleep, nutrition, rewarding activities, social support, exercise), Discussed effective parenting skills, [place]-setting to identify pt's primary goals for [facility] visit / overall health, Supportive techniques, Emphasized self-care as important for managing overall health and Identified maladaptive thoughts; problem solving re: family relationships      Pt Behavioral Change Plan:  Follow up in 3-4 weeks      Please note this report has been partially produced using speech recognition software  And may cause contain errors related to that system including grammar, punctuation and spelling as well as words and phrases that may seem inappropriate.  If there are questions or concerns please feel free to contact me to clarify.

## 2021-04-16 ENCOUNTER — VIRTUAL VISIT (OUTPATIENT)
Dept: FAMILY MEDICINE CLINIC | Age: 31
End: 2021-04-16
Payer: COMMERCIAL

## 2021-04-16 ENCOUNTER — NURSE ONLY (OUTPATIENT)
Dept: PRIMARY CARE CLINIC | Age: 31
End: 2021-04-16

## 2021-04-16 DIAGNOSIS — B34.9 VIRAL ILLNESS: ICD-10-CM

## 2021-04-16 DIAGNOSIS — J02.9 SORE THROAT: Primary | ICD-10-CM

## 2021-04-16 PROCEDURE — G8427 DOCREV CUR MEDS BY ELIG CLIN: HCPCS | Performed by: NURSE PRACTITIONER

## 2021-04-16 PROCEDURE — 1036F TOBACCO NON-USER: CPT | Performed by: NURSE PRACTITIONER

## 2021-04-16 PROCEDURE — G8417 CALC BMI ABV UP PARAM F/U: HCPCS | Performed by: NURSE PRACTITIONER

## 2021-04-16 PROCEDURE — 99213 OFFICE O/P EST LOW 20 MIN: CPT | Performed by: NURSE PRACTITIONER

## 2021-04-16 RX ORDER — AZITHROMYCIN 250 MG/1
250 TABLET, FILM COATED ORAL SEE ADMIN INSTRUCTIONS
Qty: 6 TABLET | Refills: 0 | Status: SHIPPED | OUTPATIENT
Start: 2021-04-16 | End: 2021-04-21

## 2021-04-16 ASSESSMENT — ENCOUNTER SYMPTOMS
RHINORRHEA: 0
COUGH: 1
SORE THROAT: 1
NAUSEA: 0
WHEEZING: 0
SHORTNESS OF BREATH: 0
SINUS PRESSURE: 0
VOMITING: 0
SWOLLEN GLANDS: 0
ABDOMINAL PAIN: 1

## 2021-04-16 NOTE — PATIENT INSTRUCTIONS
chances of quitting for good. · Use a vaporizer or humidifier to add moisture to your bedroom. Follow the directions for cleaning the machine. When should you call for help? Call your doctor now or seek immediate medical care if:    · You have new or worse trouble swallowing.     · Your sore throat gets much worse on one side. Watch closely for changes in your health, and be sure to contact your doctor if you do not get better as expected. Where can you learn more? Go to [email or web address]://[email or web address]. org and sign in to your [employer] account. Enter A119 in the [employer] box to learn more about \"Sore Throat: Care Instructions. \"     If you do not have an account, please click on the \"Sign Up Now\" link. Current as of: December 2, 2020               Content Version: 12.8  © 8555-1028 Healthwise, Incorporated. Care instructions adapted under license by [facility] ([facility]). If you have questions about a medical condition or this instruction, always ask your healthcare professional. [place] [address] any warranty or liability for your use of this information.

## 2021-04-16 NOTE — PROGRESS NOTES
Subjective:      Patient ID: dEuardo Petersen is a 31 y.o. female who presents today for:     Chief Complaint   Patient presents with    Pharyngitis       Pharyngitis  This is a new problem. The current episode started in the past 7 days (Wednesday). The problem occurs constantly. The problem has been unchanged. Associated symptoms include abdominal pain (mild), congestion, coughing, headaches and a sore throat. Pertinent negatives include no chills, fatigue, fever, myalgias, nausea, rash, swollen glands or vomiting. Treatments tried: allergy meds. The treatment provided no relief. Had Kelsey in the beginning of December. Past Medical History:   Diagnosis Date    Arthritis     spine & fingers    Asthma     since childhood    Chronic back pain     Chronic headaches     Depression     Depression     PONV (postoperative nausea and vomiting)     Postpartum depression 2017    Seizures ([address]      Past Surgical History:   Procedure Laterality Date    APPENDECTOMY  2016    CERVICAL LAMINECTOMY Left 2019    POSTERIOR FORAMINOTOMIES C 5-6, C 6-7 LEFT performed by Andi Norton MD at [address] [place] [address] N/A 2017     SECTION performed by Margareth Jack MD at [facility] L[facility]D OR    LAPAROSCOPIC APPENDECTOMY  2016    Dr Poncho Han History   Problem Relation Age of Onset    Depression Mother     Arthritis Mother         TKR    High Blood Pressure Father         No congenital heart problems, no aneurysms.  Diabetes Father         Micheline Hurtado might have had colon cancer she will update me.  Arthritis Maternal Grandmother     Cancer Maternal Grandmother         Unsure    Diabetes Maternal Grandmother     Arthritis Paternal Grandmother     Diabetes Paternal Grandmother     Stroke Paternal Grandmother     Cancer Paternal Grandfather         Colon? ? She's not sure     Arthritis Sister     No Known Problems Brother    Katherine Ruelas Other Son pyloric stenosis / OR at 11 weeks age     Social History     Socioeconomic History    Marital status: Single     Spouse name: Not on file    Number of children: Not on file    Years of education: Not on file    Highest education level: Not on file   Occupational History    Not on file   Social Needs    Financial resource strain: Not on file    Food insecurity     Worry: Not on file     Inability: Not on file    Transportation needs     Medical: Not on file     Non-medical: Not on file   Tobacco Use    Smoking status: Never Smoker    Smokeless tobacco: Never Used   Substance and Sexual Activity    Alcohol use: Not Currently     Alcohol/week: 0.0 standard drinks    Drug use: Yes     Types: Marijuana     Comment: vape daily    Sexual activity: Yes     Partners: Male   Lifestyle    Physical activity     Days per week: Not on file     Minutes per session: Not on file    Stress: Not on file   Relationships    Social connections     Talks on phone: Not on file     Gets together: Not on file     Attends Adventism service: Not on file     Active member of club or organization: Not on file     Attends meetings of clubs or organizations: Not on file     Relationship status: Not on file    Intimate partner violence     Fear of current or ex partner: Not on file     Emotionally abused: Not on file     Physically abused: Not on file     Forced sexual activity: Not on file   Other Topics Concern    Not on file   Social History Narrative    Not on file     Current Outpatient Medications on File Prior to Visit   Medication Sig Dispense Refill    FLOVENT  MCG/ACT inhaler inhale 2 puffs by mouth and INTO THE LUNGS twice a day 3 Inhaler 1    pregabalin (LYRICA) 25 MG capsule Take 25 mg by mouth 2 times daily.       Blood Pressure Monitoring (B-D ASSURE BPM/AUTO ARM CUFF) MISC USE AS DIRECTED 1 each 0    tiZANidine (ZANAFLEX) 2 MG tablet Take 2 mg by mouth every 6 hours as needed      VENTOLIN  (90 throat  azithromycin (ZITHROMAX) 250 MG tablet   2. Viral illness  COVID-19 Ambulatory       Plan:      Orders Placed This Encounter   Procedures    COVID-19 Ambulatory     Standing Status:   Future     Standing Expiration Date:   4/16/2022     Scheduling Instructions:      Saline media preferred given current shortage of viral transport media but both acceptable     Order Specific Question:   Is this test for diagnosis or screening? Answer:   Diagnosis of ill patient     Order Specific Question:   Symptomatic for COVID-19 as defined by CDC? Answer:   Yes     Order Specific Question:   Date of Symptom Onset     Answer:   4/14/2021     Order Specific Question:   Hospitalized for COVID-19? Answer:   No     Order Specific Question:   Admitted to ICU for COVID-19? Answer:   No     Order Specific Question:   Employed in healthcare setting? Answer:   No     Order Specific Question:   Resident in a congregate (group) care setting? Answer:   No     Order Specific Question:   Pregnant? Answer:   No     Order Specific Question:   Previously tested for COVID-19? Answer:   Yes          Orders Placed This Encounter   Medications    azithromycin (ZITHROMAX) 250 MG tablet     Sig: Take 1 tablet by mouth See Admin Instructions for 5 days 500mg on day 1 followed by 250mg on days 2 - 5     Dispense:  6 tablet     Refill:  0       Return if symptoms worsen or fail to improve. Will need testing for COVID-19 and quarantine until test resulted is negative with symptom improvement or 10 days from of onset of symptoms. Discussed OTC comfort care. Pt to f/u if not improving as expected or to Er if symptoms severe. Reviewed with the patient: current clinicalstatus, medications, activities and diet.      Side effects, adverse effects of the medication prescribedtoday, as well as treatment plan/ rationale and result expectations have been discussedwith the patient who expresses understanding and desires to proceed. Close follow upto evaluate treatment results and for coordination of care. I have reviewedthe patient's medical history in detail and updated the computerized patient record. TELEHEALTH EVALUATION -- Audio/Visual (During JRUYD-92 public health emergency)    -   Mitchel Carmona is a 31 y.o. female being evaluated by a Virtual Visit (video visit) encounter to address concerns as mentioned above. A caregiver was present when appropriate. Due to this being a TeleHealth encounter (During WKGJQ-04 public health emergency), evaluation of the following organ systems was limited: Vitals/Constitutional/EENT/Resp/CV/GI//MS/Neuro/Skin/Heme-Lymph-Imm. Pursuant to the emergency declaration under the [address] [place] [address] authority and the "[employer] and [employer] Act, this Virtual Visit was conducted with patient's (and/or legal guardian's) consent, to reduce the patient's risk of exposure to COVID-19 and provide necessary medical care. The patient (and/or legal guardian) has also been advised to contact this office for worsening conditions or problems, and seek emergency medical treatment and/or call 911 if deemed necessary. Services were provided through a video synchronous discussion virtually to substitute for in-person clinic visit. Type of encounter was X__ Doxy __ MyChart ___Facetime    Patient was located at their home. Provider was located at their ___ home or        __X__ office. --MARIAMA Lopez - CNP on 4/16/2021 at 8:49 AM    An electronic signature was used to authenticate this note.

## 2021-04-17 LAB
SARS-COV-2: NOT DETECTED
SOURCE: NORMAL

## 2021-04-19 ENCOUNTER — OFFICE VISIT (OUTPATIENT)
Dept: FAMILY MEDICINE CLINIC | Age: 31
End: 2021-04-19
Payer: COMMERCIAL

## 2021-04-19 VITALS
HEIGHT: 66 IN | HEART RATE: 93 BPM | DIASTOLIC BLOOD PRESSURE: 82 MMHG | SYSTOLIC BLOOD PRESSURE: 124 MMHG | TEMPERATURE: 97.7 F | BODY MASS INDEX: 27.32 KG/M2 | OXYGEN SATURATION: 96 % | WEIGHT: 170 LBS

## 2021-04-19 DIAGNOSIS — J34.89 SINUS PRESSURE: ICD-10-CM

## 2021-04-19 DIAGNOSIS — J02.9 ACUTE PHARYNGITIS, UNSPECIFIED ETIOLOGY: Primary | ICD-10-CM

## 2021-04-19 PROCEDURE — G8427 DOCREV CUR MEDS BY ELIG CLIN: HCPCS | Performed by: PHYSICIAN ASSISTANT

## 2021-04-19 PROCEDURE — G8417 CALC BMI ABV UP PARAM F/U: HCPCS | Performed by: PHYSICIAN ASSISTANT

## 2021-04-19 PROCEDURE — 99213 OFFICE O/P EST LOW 20 MIN: CPT | Performed by: PHYSICIAN ASSISTANT

## 2021-04-19 PROCEDURE — 1036F TOBACCO NON-USER: CPT | Performed by: PHYSICIAN ASSISTANT

## 2021-04-19 RX ORDER — AMOXICILLIN AND CLAVULANATE POTASSIUM 875; 125 MG/1; MG/1
1 TABLET, FILM COATED ORAL 2 TIMES DAILY
Qty: 14 TABLET | Refills: 0 | Status: SHIPPED | OUTPATIENT
Start: 2021-04-19 | End: 2021-04-26

## 2021-04-19 ASSESSMENT — ENCOUNTER SYMPTOMS
ABDOMINAL PAIN: 0
TROUBLE SWALLOWING: 0
COUGH: 0
DIARRHEA: 0
SHORTNESS OF BREATH: 0
SORE THROAT: 1
SINUS PRESSURE: 1
CHEST TIGHTNESS: 0
BACK PAIN: 0
VOMITING: 0

## 2021-04-19 NOTE — PATIENT INSTRUCTIONS
chances of quitting for good. · Use a vaporizer or humidifier to add moisture to your bedroom. Follow the directions for cleaning the machine. When should you call for help? Call your doctor now or seek immediate medical care if:    · You have new or worse trouble swallowing.     · Your sore throat gets much worse on one side. Watch closely for changes in your health, and be sure to contact your doctor if you do not get better as expected. Where can you learn more? Go to https://[employer]. org and sign in to your [employer] account. Enter I909 in the [employer] box to learn more about \"Sore Throat: Care Instructions. \"     If you do not have an account, please click on the \"Sign Up Now\" link. Current as of: December 2, 2020               Content Version: 12.8  © 6728-6052 Healthwise, Incorporated. Care instructions adapted under license by [facility] ([facility]). If you have questions about a medical condition or this instruction, always ask your healthcare professional. [address] any warranty or liability for your use of this information.

## 2021-04-19 NOTE — PROGRESS NOTES
Subjective:      Patient ID: Ron Peng is a 31 y.o. female who presents today for:  Chief Complaint   Patient presents with    Pharyngitis     Pt is here c/o of sore throat. Pt states she has had a virtual visit on Thursday with Rudy Jones, and was given antibiotics.  Migraine     Pt is here c/o migraine that started on Thursday. Pt states she has taken Aleve yesterday with little relief. HPI  31year old female who complains of a sore throat and sinus pressure for the past 5 days. She was seen in a virtual visit four days ago and prescribed zithromax. She states that the antibiotic is not helping.  She also had a negative COVID-19 test at the time    Past Medical History:   Diagnosis Date    Arthritis     spine & fingers    Asthma     since childhood    Chronic back pain     Chronic headaches     Depression     Depression     PONV (postoperative nausea and vomiting)     Postpartum depression 2017    Seizures ([address]      Past Surgical History:   Procedure Laterality Date    APPENDECTOMY  2016    CERVICAL LAMINECTOMY Left 2019    POSTERIOR FORAMINOTOMIES C 5-6, C 6-7 LEFT performed by Janette Saldana MD at [address] [place] [address] N/A 2017     SECTION performed by Nelsy Pandey MD at [facility] [place] [facility]    LAPAROSCOPIC APPENDECTOMY  2016    Dr Ezekiel Godfrey History     Socioeconomic History    Marital status: Single     Spouse name: Not on file    Number of children: Not on file    Years of education: Not on file    Highest education level: Not on file   Occupational History    Not on file   Social Needs    Financial resource strain: Not on file    Food insecurity     Worry: Not on file     Inability: Not on file    Transportation needs     Medical: Not on file     Non-medical: Not on file   Tobacco Use    Smoking status: Never Smoker    Smokeless tobacco: Never Used   Substance and Sexual Activity    Alcohol use: Not Currently     Alcohol/week: 0.0 standard drinks    Drug use: Yes     Types: Marijuana     Comment: vape daily    Sexual activity: Yes     Partners: Male   Lifestyle    Physical activity     Days per week: Not on file     Minutes per session: Not on file    Stress: Not on file   Relationships    Social connections     Talks on phone: Not on file     Gets together: Not on file     Attends Mormon service: Not on file     Active member of club or organization: Not on file     Attends meetings of clubs or organizations: Not on file     Relationship status: Not on file    Intimate partner violence     Fear of current or ex partner: Not on file     Emotionally abused: Not on file     Physically abused: Not on file     Forced sexual activity: Not on file   Other Topics Concern    Not on file   Social History Narrative    Not on file     Family History   Problem Relation Age of Onset    Depression Mother    Orly Soto Arthritis Mother         TKR    High Blood Pressure Father         No congenital heart problems, no aneurysms.  Diabetes Father         Jayant Paz might have had colon cancer she will update me.  Arthritis Maternal Grandmother     Cancer Maternal Grandmother         Unsure    Diabetes Maternal Grandmother     Arthritis Paternal Grandmother     Diabetes Paternal Grandmother     Stroke Paternal Grandmother     Cancer Paternal Grandfather         Colon? ? She's not sure     Arthritis Sister     No Known Problems Brother     Other Son         pyloric stenosis / OR at 11 weeks age     Allergies   Allergen Reactions    Citalopram      Was ineffective     Effexor [Venlafaxine]      MADE HER SUICIDAL    Fluvoxamine     Geodon [Ziprasidone Hcl] Other (See Comments)     tremors    Metronidazole Diarrhea and Nausea Only    Seasonal Other (See Comments)     Sinus sx    Sertraline      Stomach aches     Trintellix [Vortioxetine]     Vilazodone      WEIGHT GAIN     Wellbutrin [Bupropion] behavioral problems and confusion. All other systems reviewed and are negative. Objective:   /82 (Site: Right Upper Arm, Position: Sitting, Cuff Size: Small Adult)   Pulse 93   Temp 97.7 °F (36.5 °C)   Ht 5' 6\" (1.676 m)   Wt 170 lb (77.1 kg)   SpO2 96%   BMI 27.44 kg/m²     Physical Exam  Vitals signs and nursing note reviewed. Constitutional:       General: She is awake. She is not in acute distress. Appearance: Normal appearance. She is well-developed and normal weight. She is not ill-appearing, toxic-appearing or diaphoretic. Comments: No photophobia. No phonophobia. HENT:      Head: Normocephalic and atraumatic. No Mlies's sign. Right Ear: External ear normal.      Left Ear: External ear normal.      Nose: Nose normal. No congestion or rhinorrhea. Mouth/Throat:      Mouth: Mucous membranes are moist.      Pharynx: Oropharynx is clear. Posterior oropharyngeal erythema present. No oropharyngeal exudate. Eyes:      General: No scleral icterus. Right eye: No foreign body or discharge. Left eye: No discharge. Extraocular Movements: Extraocular movements intact. Conjunctiva/sclera: Conjunctivae normal.      Left eye: No exudate. Pupils: Pupils are equal, round, and reactive to light. Neck:      Musculoskeletal: Normal range of motion and neck supple. No neck rigidity. Vascular: No JVD. Trachea: No tracheal deviation. Comments: No meningismus. Cardiovascular:      Rate and Rhythm: Normal rate and regular rhythm. Heart sounds: Normal heart sounds. Heart sounds not distant. No murmur. No friction rub. No gallop. Pulmonary:      Effort: Pulmonary effort is normal. No respiratory distress. Breath sounds: Normal breath sounds. No stridor. No wheezing, rhonchi or rales. Chest:      Chest wall: No tenderness. Abdominal:      General: Abdomen is flat. Bowel sounds are normal. There is no distension.       Palpations:

## 2021-04-26 ENCOUNTER — PATIENT MESSAGE (OUTPATIENT)
Dept: FAMILY MEDICINE CLINIC | Age: 31
End: 2021-04-26

## 2021-05-04 ENCOUNTER — VIRTUAL VISIT (OUTPATIENT)
Dept: BEHAVIORAL/MENTAL HEALTH CLINIC | Age: 31
End: 2021-05-04
Payer: COMMERCIAL

## 2021-05-04 DIAGNOSIS — F34.1 PERSISTENT DEPRESSIVE DISORDER WITH ANXIOUS DISTRESS, CURRENTLY MODERATE: Primary | ICD-10-CM

## 2021-05-04 PROCEDURE — 90832 PSYTX W PT 30 MINUTES: CPT | Performed by: PSYCHOLOGIST

## 2021-05-04 ASSESSMENT — PATIENT HEALTH QUESTIONNAIRE - PHQ9
SUM OF ALL RESPONSES TO PHQ QUESTIONS 1-9: 24
5. POOR APPETITE OR OVEREATING: 3
8. MOVING OR SPEAKING SO SLOWLY THAT OTHER PEOPLE COULD HAVE NOTICED. OR THE OPPOSITE, BEING SO FIGETY OR RESTLESS THAT YOU HAVE BEEN MOVING AROUND A LOT MORE THAN USUAL: 2
10. IF YOU CHECKED OFF ANY PROBLEMS, HOW DIFFICULT HAVE THESE PROBLEMS MADE IT FOR YOU TO DO YOUR WORK, TAKE CARE OF THINGS AT HOME, OR GET ALONG WITH OTHER PEOPLE: 2
6. FEELING BAD ABOUT YOURSELF - OR THAT YOU ARE A FAILURE OR HAVE LET YOURSELF OR YOUR FAMILY DOWN: 3
SUM OF ALL RESPONSES TO PHQ9 QUESTIONS 1 & 2: 6
9. THOUGHTS THAT YOU WOULD BE BETTER OFF DEAD, OR OF HURTING YOURSELF: 2
4. FEELING TIRED OR HAVING LITTLE ENERGY: 3

## 2021-05-04 NOTE — PROGRESS NOTES
Behavioral Health Consultation  Louann Ralph Psy.D. Psychologist  5/4/21  2:35 PM EST      Time spent with Patient:  23 minutes  This is patient's 20th  [facility] appointment. Reason for Consult:  depression  Referring Provider: Dave oPpe MD      Feedback given to PCP. TELEHEALTH EVALUATION -- Audio and/or Visual (During NOZWE-46 public health emergency)    Due to COVID 19 outbreak, patient's office visit was converted to a virtual visit. Patient was contacted and agreed to proceed with a virtual visit via [employer] me. Patient reports that they are located at home. Provider located at home office. The risks and benefits of converting to a virtual visit were discussed in light of the current infectious disease epidemic. Patient also understood that insurance coverage and co-pays are up to their individual insurance plans. Patient provides verbal consent for this visit to be billed to insurance. Pursuant to the emergency declaration under the [facility], On license of [facility] waiver authority and the [employer] Act, this Virtual  Visit was conducted, with patient's consent, to reduce the patient's risk of exposure to COVID-19 and provide continuity of care for an established patient. Services were provided through an audio and video synchronous discussion virtually to substitute for in-person clinic visit. S:  Pt reports that she met someone, but she is unsure where things stand with the relationship. States that she feels that he is no longer interested. She describes that this seems to happen in all relationships, where the guys loses interest and she is left unsure. Pt reports feeling disappointed and upset. Pt describes feeling upset with her mother and father as they still plan to take her son to [facility] to visit with her father.  Has had limited interaction with her son since the argument with her mom and has limited interaction with her mom. Reports morbid ideation, feeling it would be better to not be here but denies specific plan or intent. O:  MSE:    Appearance    alert, cooperative   Personal Hygiene : appropriately dressed, appropriately groomed and healthy looking  Appetite abnormal: inc  Sleep disturbance Yes, including: non-restful sleep  Fatigue Yes  Loss of pleasure Yes  Impulsive behavior No  Speech    spontaneous, normal rate and normal volume  Mood   depressed   Affect    depressed affect  Thought Content    intact  Thought Process    linear, goal directed and coherent  Associations    logical connections  Insight   fair  Judgment   good  Orientation    oriented to person, place, time, and general circumstances  Memory    recent and remote memory intact  Attention/Concentration    intact  Morbid ideation yes  Suicide Assessment    no suicidal ideation    History:    Medications:   Current Outpatient Medications   Medication Sig Dispense Refill    Elastic Bandages & Supports (WRIST SPLINT) MISC Left wrist splint  Dx: wrist pain 1 each 0    FLOVENT  MCG/ACT inhaler inhale 2 puffs by mouth and INTO THE LUNGS twice a day 3 Inhaler 1    pregabalin (LYRICA) 25 MG capsule Take 25 mg by mouth 2 times daily.       Blood Pressure Monitoring (B-D ASSURE BPM/AUTO ARM CUFF) MISC USE AS DIRECTED 1 each 0    tiZANidine (ZANAFLEX) 2 MG tablet Take 2 mg by mouth every 6 hours as needed      VENTOLIN  (90 Base) MCG/ACT inhaler inhale 1 puffs by mouth EVERY 6 HOURS AS NEEDED FOR WHEEZING OR SHORTNESS OF BREATH 3 Inhaler 3     Current Facility-Administered Medications   Medication Dose Route Frequency Provider Last Rate Last Admin    levonorgestrel (MIRENA) IUD 52 mg 1 each  1 each Intrauterine Once Henderson Klinefelter, MD   1 each at 10/27/20 1624       Social History:   Social History     Socioeconomic History    Marital status: Single     Spouse name: Not on file    Number of children: Not on file    Years Cancer Paternal Grandfather         Colon? ? She's not sure     Arthritis Sister     No Known Problems Brother     Other Son         pyloric stenosis / OR at 10 weeks age         A:  Administered the PHQ9 which indicates a self report of severe symptom distress. Pt would benefit from [facility] services to increase coping skills to provide symptom management/control/relief. PHQ Scores 5/4/2021 4/8/2021 3/23/2021 2/24/2021 2/9/2021 1/20/2021 1/6/2021   PHQ2 Score 6 5 4 5 5 5 6   PHQ9 Score 24 18 18 22 21 21 24     Interpretation of Total Score Depression Severity: 1-4 = Minimal depression, 5-9 = Mild depression, 10-14 = Moderate depression, 15-19 = Moderately severe depression, 20-27 = Severe depression      Diagnosis:    Persistent Depressive Disorder with Anxious Distress      Diagnosis Date    Arthritis     spine & fingers    Asthma     since childhood    Chronic back pain     Chronic headaches     Depression     Depression     PONV (postoperative nausea and vomiting)     Postpartum depression 9/16/2017    [address] [address]            Plan:  Pt interventions:  Trained in strategies for increasing balanced thinking, Discussed self-care (sleep, nutrition, rewarding activities, social support, exercise), Discussed effective parenting skills, [place]-setting to identify pt's primary goals for [facility] visit / overall health, Supportive techniques, Emphasized self-care as important for managing overall health and Identified maladaptive thoughts; problem solving re:relationship issues; discussed specialty mental health      Pt Behavioral Change Plan:  Follow up in 3-4 weeks      Please note this report has been partially produced using speech recognition software  And may cause contain errors related to that system including grammar, punctuation and spelling as well as words and phrases that may seem inappropriate. If there are questions or concerns please feel free to contact me to clarify.

## 2021-05-12 ENCOUNTER — OFFICE VISIT (OUTPATIENT)
Dept: FAMILY MEDICINE CLINIC | Age: 31
End: 2021-05-12
Payer: COMMERCIAL

## 2021-05-12 VITALS
OXYGEN SATURATION: 98 % | SYSTOLIC BLOOD PRESSURE: 110 MMHG | TEMPERATURE: 97.8 F | HEIGHT: 66 IN | RESPIRATION RATE: 15 BRPM | WEIGHT: 159 LBS | DIASTOLIC BLOOD PRESSURE: 74 MMHG | BODY MASS INDEX: 25.55 KG/M2 | HEART RATE: 82 BPM

## 2021-05-12 DIAGNOSIS — J45.909 UNCOMPLICATED ASTHMA, UNSPECIFIED ASTHMA SEVERITY, UNSPECIFIED WHETHER PERSISTENT: Primary | ICD-10-CM

## 2021-05-12 DIAGNOSIS — M79.7 FIBROMYALGIA: ICD-10-CM

## 2021-05-12 PROCEDURE — G8417 CALC BMI ABV UP PARAM F/U: HCPCS | Performed by: INTERNAL MEDICINE

## 2021-05-12 PROCEDURE — 99213 OFFICE O/P EST LOW 20 MIN: CPT | Performed by: INTERNAL MEDICINE

## 2021-05-12 PROCEDURE — 1036F TOBACCO NON-USER: CPT | Performed by: INTERNAL MEDICINE

## 2021-05-12 PROCEDURE — G8427 DOCREV CUR MEDS BY ELIG CLIN: HCPCS | Performed by: INTERNAL MEDICINE

## 2021-05-12 RX ORDER — FLUTICASONE PROPIONATE 220 UG/1
2 AEROSOL, METERED RESPIRATORY (INHALATION) 2 TIMES DAILY
Qty: 1 INHALER | Refills: 1 | Status: SHIPPED | OUTPATIENT
Start: 2021-05-12 | End: 2021-07-05

## 2021-05-12 RX ORDER — PREGABALIN 50 MG/1
CAPSULE ORAL
COMMUNITY
Start: 2021-04-23 | End: 2021-05-12

## 2021-05-12 ASSESSMENT — ENCOUNTER SYMPTOMS
SHORTNESS OF BREATH: 0
EYE PAIN: 0
BACK PAIN: 0
ABDOMINAL PAIN: 0

## 2021-05-12 NOTE — PROGRESS NOTES
Subjective:      Patient ID: Inés Rogers is a 31 y.o. female who presents today with:  Chief Complaint   Patient presents with    6 Month Follow-Up    Hypertension    Anxiety       HPI    Asthma  Ventolin use is several times per week  On flovent  On albuterol  No si/hi  On lyrica for fibromyalgia  Prn muscle relaxer's  Overall doing okay. Didn't want to f/ with speciality mental health. Elevated blood pressure.    Past Medical History:   Diagnosis Date    Arthritis     spine & fingers    Asthma     since childhood    Chronic back pain     Chronic headaches     Depression     Depression     PONV (postoperative nausea and vomiting)     Postpartum depression 2017    Seizures ([address]      Past Surgical History:   Procedure Laterality Date    APPENDECTOMY  2016    CERVICAL LAMINECTOMY Left 2019    POSTERIOR FORAMINOTOMIES C 5-6, C 6-7 LEFT performed by Ryne Fischer MD at [address] [place] [address] N/A 2017     SECTION performed by Sourav Solomon MD at [facility]    LAPAROSCOPIC APPENDECTOMY  2016    Dr Chloé Santiago History     Socioeconomic History    Marital status: Single     Spouse name: Not on file    Number of children: Not on file    Years of education: Not on file    Highest education level: Not on file   Occupational History    Not on file   Social Needs    Financial resource strain: Somewhat hard    Food insecurity     Worry: Never true     Inability: Never true    Transportation needs     Medical: No     Non-medical: No   Tobacco Use    Smoking status: Never Smoker    Smokeless tobacco: Never Used   Substance and Sexual Activity    Alcohol use: Not Currently     Alcohol/week: 0.0 standard drinks    Drug use: Yes     Types: Marijuana     Comment: vape daily    Sexual activity: Yes     Partners: Male   Lifestyle    Physical activity     Days per week: Not on file     Minutes per session: Not on file    Stress: Not on file   Relationships    Social connections     Talks on phone: Not on file     Gets together: Not on file     Attends Gnosticist service: Not on file     Active member of club or organization: Not on file     Attends meetings of clubs or organizations: Not on file     Relationship status: Not on file    Intimate partner violence     Fear of current or ex partner: Not on file     Emotionally abused: Not on file     Physically abused: Not on file     Forced sexual activity: Not on file   Other Topics Concern    Not on file   Social History Narrative    Not on file     Allergies   Allergen Reactions    Citalopram      Was ineffective     Effexor [Venlafaxine]      MADE HER SUICIDAL    Fluvoxamine     Geodon [Ziprasidone Hcl] Other (See Comments)     tremors    Metronidazole Diarrhea and Nausea Only    Seasonal Other (See Comments)     Sinus sx    Sertraline      Stomach aches     Trintellix [Vortioxetine]     Vilazodone      WEIGHT GAIN     Wellbutrin [Bupropion]      SEIZURE    Onion Rash     stomach ache     Current Outpatient Medications on File Prior to Visit   Medication Sig Dispense Refill    pregabalin (LYRICA) 25 MG capsule Take 25 mg by mouth 2 times daily.  tiZANidine (ZANAFLEX) 2 MG tablet Take 2 mg by mouth every 6 hours as needed      VENTOLIN  (90 Base) MCG/ACT inhaler inhale 1 puffs by mouth EVERY 6 HOURS AS NEEDED FOR WHEEZING OR SHORTNESS OF BREATH 3 Inhaler 3     Current Facility-Administered Medications on File Prior to Visit   Medication Dose Route Frequency Provider Last Rate Last Admin    levonorgestrel (MIRENA) IUD 52 mg 1 each  1 each Intrauterine Once Florence Guillen MD   1 each at 10/27/20 1624       I have personally reviewed the ROS, PMH, PFH, and social history     Review of Systems   Constitutional: Negative for chills and fever. HENT: Negative for congestion. Eyes: Negative for pain. Respiratory: Negative for shortness of breath. Cardiovascular: Negative for chest pain. Gastrointestinal: Negative for abdominal pain. Genitourinary: Negative for hematuria. Musculoskeletal: Negative for back pain. Allergic/Immunologic: Negative for immunocompromised state. Neurological: Negative for headaches. Psychiatric/Behavioral: Negative for hallucinations. Objective:   BP (!) 120/91 (Site: Right Upper Arm, Position: Sitting, Cuff Size: Large Adult)   Pulse 82   Temp 97.8 °F (36.6 °C) (Oral)   Resp 15   Ht 5' 6\" (1.676 m)   Wt 159 lb (72.1 kg)   SpO2 98%   BMI 25.66 kg/m²     Physical Exam  Constitutional:       General: She is not in acute distress. Appearance: Normal appearance. She is not ill-appearing, toxic-appearing or diaphoretic. HENT:      Head: Normocephalic. Neck:      Musculoskeletal: Neck supple. Vascular: No carotid bruit. Cardiovascular:      Rate and Rhythm: Normal rate and regular rhythm. Pulses: Normal pulses. Heart sounds: Normal heart sounds. No murmur. No friction rub. No gallop. Pulmonary:      Effort: Pulmonary effort is normal. No respiratory distress. Breath sounds: Normal breath sounds. No wheezing, rhonchi or rales. Abdominal:      General: Abdomen is flat. There is no distension. Palpations: Abdomen is soft. Tenderness: There is no abdominal tenderness. There is no right CVA tenderness, left CVA tenderness, guarding or rebound. Musculoskeletal:      Right lower leg: No edema. Left lower leg: No edema. Skin:     General: Skin is warm. Findings: No erythema or rash. Neurological:      Mental Status: She is alert. Psychiatric:         Mood and Affect: Mood normal.           Assessment:       Diagnosis Orders   1. Uncomplicated asthma, unspecified asthma severity, unspecified whether persistent     2. Fibromyalgia           Plan:    vc  Please get bw   Continue chronic medications. 2 SDR with colon cancer will send to GI at age 35.    Seeing  Liza in march, lyrica was increased.  (fibromyalgia)  pap test 2019 through Dr. Mikayla Garza, call if you stop seeing him. (she is following up)   increase Flovent               No orders of the defined types were placed in this encounter. Orders Placed This Encounter   Medications    fluticasone (FLOVENT HFA) 220 MCG/ACT inhaler     Sig: Inhale 2 puffs into the lungs 2 times daily     Dispense:  1 Inhaler     Refill:  1   refused covid vaccine   Asthma better with flovent. (she's doing well with this)   Please see [facility]. (\"didn't feel like making phone calls per patient\"   Patient was offered pregnancy test, she declined, she understands risks of birth defects. Wash mouth out after using  Risk of thrush and pneumonia. She doesn't want to change therapy for mental health related issues. If anything should change or worsen call ASAP, don't wait for next scheduled appointment. Return in about 6 months (around 11/12/2021) for worsening symptoms, call ASAP for appointment, Chronic condition management/appointment.       Shira Iverson MD

## 2021-05-12 NOTE — PATIENT INSTRUCTIONS
your diet, or if you have a family history of osteoporosis. Talk with your doctor about your risk. Tell your doctor if you are pregnant. It is not known whether fluticasone will harm an unborn baby. However, having untreated or uncontrolled asthma during pregnancy may cause complications such as low birth weight, premature birth, or eclampsia (dangerously high blood pressure that can lead to medical problems in both mother and baby). The benefit of treating asthma may outweigh any risks to the baby. It may not be safe to breastfeed while using this medicine. Ask your doctor about any risk. Do not give this medicine to a child without medical advice. How should I use fluticasone inhalation? Follow all directions on your prescription label and read all medication guides or instruction sheets. Use the medicine exactly as directed. Do not use the medicine more often than prescribed. Fluticasone inhalation is not a rescue medicine for asthma attacks. Use only fast-acting inhalation medicine for an attack. Seek medical attention if your breathing problems get worse quickly, or if you think your asthma medications are not working as well. Flovent is a liquid form of fluticasone used with an inhaler device. This device creates a spray that you inhale through your mouth and into your lungs. Flovent Diskus, ArmonAir, and Arnuity Ellipta are powder forms of fluticasone that come with a special inhaler device preloaded with blister packs containing measured doses of fluticasone. The device opens and loads a blister of fluticasone each time you use the inhaler. Read and carefully follow any Instructions for Use provided with your medicine. Ask your doctor or pharmacist if you do not understand these instructions. Do not allow a young child to use this medicine without help from an adult. Your dose needs may change if you have surgery, are ill, are under stress, or have recently had an asthma attack.  Do not change your medication dose or stop using fluticasone without your doctor's advice. To reduce the chance of developing a yeast infection in your mouth, rinse with water (but do not swallow) after using this medicine. Pay special attention to your dental hygiene. Fluticasone can cause cavities or tooth discoloration. If you also use an oral steroid medication, you should not stop using it suddenly. Follow your doctor's instructions about tapering your dose. In case of emergency, wear or carry medical identification to let others know you may need an oral steroid in an emergency. Your doctor will need to check your progress on a regular basis. Your vision may also need to be checked at regular intervals. Call your doctor if your symptoms do not improve after 2 weeks of treatment, or if symptoms get worse. If you use a peak flow meter at home, call your doctor if your numbers are lower than normal.  You should not stop using fluticasone inhalation suddenly. Stopping suddenly may make your condition worse. Store at room temperature away from moisture, light, and extreme heat or cold. Store the powder forms in the unopened foil pouch or tray until ready to use. Throw away any unused medicine when the inhalations counter on your inhaler device shows \"0.\" Get your prescription refilled before you run out of medicine completely. Store the [employer] canister with the mouthpiece down. Keep the canister away from open flame or high heat, such as in a car on a hot day. The canister may explode if it gets too hot. Do not puncture or burn an empty inhaler canister. What happens if I miss a dose? Skip the missed dose and use your next dose at the regular time. Do not use two doses at one time. If you use Arnuity Ellipta, do not use more than 1 inhalation per day. What happens if I overdose? Seek emergency medical attention or call the Poison Help line at 1-115.182.7365.   An overdose of fluticasone is not expected to produce life threatening symptoms. Long term use of an inhaled steroid can lead to glaucoma, cataracts, thinning skin, changes in body fat (especially in your face, neck, back, and waist), increased acne or facial hair, menstrual problems, impotence, or loss of interest in sex. What should I avoid while using fluticasone inhalation? Avoid being near people who are sick or have infections. Call your doctor for preventive treatment if you are exposed to chickenpox or measles. These conditions can be serious or even fatal in people who are using steroid medicine. What are the possible side effects of fluticasone inhalation? Get emergency medical help if you have signs of an allergic reaction: hives; difficult breathing; swelling of your face, lips, tongue, or throat. Call your doctor at once if you have:  · weakness, tired feeling, nausea, vomiting, feeling like you might pass out;  · wheezing, choking, or other breathing problems after using this medicine;  · blurred vision, tunnel vision, eye pain, or seeing halos around lights;  · worsening of your asthma symptoms;  · blood vessel inflammation --fever, cough, stomach pain, weight loss, skin rash, severe tingling, numbness, chest pain; or  · liver problems --upper stomach pain, loss of appetite, dark urine, silverio-colored stools, jaundice (yellowing of the skin or eyes). Fluticasone can affect growth in children. Tell your doctor if your child is not growing at a normal rate while using this medicine. Common side effects may include:  · cold symptoms such as stuffy nose, sneezing, sore throat, sinus pain;  · low fever, cough, wheezing, chest tightness;  · hoarseness or deepened voice;  · white patches or sores inside your mouth or on your lips;  · headache; or  · nausea, vomiting, upset stomach. This is not a complete list of side effects and others may occur. Call your doctor for medical advice about side effects. You may report side effects to FDA at 0-952-FDA-1064. What other drugs will affect fluticasone inhalation? Sometimes it is not safe to use certain medications at the same time. Some drugs can affect your blood levels of other drugs you take, which may increase side effects or make the medications less effective. Tell your doctor about all your other medicines, especially:  · an antibiotic;  · antifungal medicine;  · antiviral medicine to treat HIV or AIDS; or  · steroid medicine. This list is not complete. Other drugs may affect fluticasone, including prescription and over-the-counter medicines, vitamins, and herbal products. Not all possible drug interactions are listed here. Where can I get more information? Your pharmacist can provide more information about fluticasone inhalation. Remember, keep this and all other medicines out of the reach of children, never share your medicines with others, and use this medication only for the indication prescribed. Every effort has been made to ensure that the information provided by [address] is accurate, up-to-date, and complete, but no guarantee is made to that effect. Drug information contained herein may be time sensitive. [facility] information has been compiled for use by healthcare practitioners and consumers in the [place] and therefore [employer]made does not warrant that uses outside of the [place] are appropriate, unless specifically indicated otherwise. [facility]'s drug information does not endorse drugs, diagnose patients or recommend therapy. [facility] drug information is an informational resource designed to assist licensed healthcare practitioners in caring for their patients and/or to serve consumers viewing this service as a supplement to, and not a substitute for, the expertise, skill, knowledge and judgment of healthcare practitioners.  The absence of a warning for a given drug or drug combination in no way should be construed to indicate that the drug or drug combination is safe, effective or appropriate for any given patient. [facility] does not assume any responsibility for any aspect of healthcare administered with the aid of information [facility] provides. The information contained herein is not intended to cover all possible uses, directions, precautions, warnings, drug interactions, allergic reactions, or adverse effects. If you have questions about the drugs you are taking, check with your doctor, nurse or pharmacist.  Copyright 4570-1799 [address]: 15.01. Revision date: 9/23/2020. Care instructions adapted under license by [facility] ([facility]). If you have questions about a medical condition or this instruction, always ask your healthcare professional. [place] [address] any warranty or liability for your use of this information.

## 2021-05-17 DIAGNOSIS — E55.9 VITAMIN D DEFICIENCY: ICD-10-CM

## 2021-05-17 DIAGNOSIS — F41.9 ANXIETY: ICD-10-CM

## 2021-05-17 DIAGNOSIS — I10 ESSENTIAL HYPERTENSION: ICD-10-CM

## 2021-05-17 DIAGNOSIS — J45.40 MODERATE PERSISTENT ASTHMA WITHOUT COMPLICATION: ICD-10-CM

## 2021-05-17 LAB
ALBUMIN SERPL-MCNC: 4.4 G/DL (ref 3.5–4.6)
ALP BLD-CCNC: 56 U/L (ref 40–130)
ALT SERPL-CCNC: 36 U/L (ref 0–33)
ANION GAP SERPL CALCULATED.3IONS-SCNC: 12 MEQ/L (ref 9–15)
AST SERPL-CCNC: 36 U/L (ref 0–35)
BASOPHILS ABSOLUTE: 0 K/UL (ref 0–0.2)
BASOPHILS RELATIVE PERCENT: 0.7 %
BILIRUB SERPL-MCNC: 0.4 MG/DL (ref 0.2–0.7)
BUN BLDV-MCNC: 8 MG/DL (ref 6–20)
CALCIUM SERPL-MCNC: 9.9 MG/DL (ref 8.5–9.9)
CHLORIDE BLD-SCNC: 106 MEQ/L (ref 95–107)
CHOLESTEROL, TOTAL: 149 MG/DL (ref 0–199)
CO2: 24 MEQ/L (ref 20–31)
CREAT SERPL-MCNC: 0.73 MG/DL (ref 0.5–0.9)
EOSINOPHILS ABSOLUTE: 0 K/UL (ref 0–0.7)
EOSINOPHILS RELATIVE PERCENT: 0.6 %
GFR AFRICAN AMERICAN: >60
GFR NON-AFRICAN AMERICAN: >60
GLOBULIN: 2.2 G/DL (ref 2.3–3.5)
GLUCOSE BLD-MCNC: 122 MG/DL (ref 70–99)
HCT VFR BLD CALC: 41.7 % (ref 37–47)
HDLC SERPL-MCNC: 44 MG/DL (ref 40–59)
HEMOGLOBIN: 14 G/DL (ref 12–16)
LDL CHOLESTEROL CALCULATED: 91 MG/DL (ref 0–129)
LYMPHOCYTES ABSOLUTE: 1.5 K/UL (ref 1–4.8)
LYMPHOCYTES RELATIVE PERCENT: 22.6 %
MCH RBC QN AUTO: 30.8 PG (ref 27–31.3)
MCHC RBC AUTO-ENTMCNC: 33.6 % (ref 33–37)
MCV RBC AUTO: 91.7 FL (ref 82–100)
MONOCYTES ABSOLUTE: 0.2 K/UL (ref 0.2–0.8)
MONOCYTES RELATIVE PERCENT: 3.4 %
NEUTROPHILS ABSOLUTE: 4.9 K/UL (ref 1.4–6.5)
NEUTROPHILS RELATIVE PERCENT: 72.7 %
PDW BLD-RTO: 12.8 % (ref 11.5–14.5)
PLATELET # BLD: 199 K/UL (ref 130–400)
POTASSIUM SERPL-SCNC: 4.2 MEQ/L (ref 3.4–4.9)
RBC # BLD: 4.55 M/UL (ref 4.2–5.4)
SODIUM BLD-SCNC: 142 MEQ/L (ref 135–144)
TOTAL PROTEIN: 6.6 G/DL (ref 6.3–8)
TRIGL SERPL-MCNC: 72 MG/DL (ref 0–150)
TSH REFLEX: 0.8 UIU/ML (ref 0.44–3.86)
VITAMIN D 25-HYDROXY: 22.6 NG/ML (ref 30–100)
WBC # BLD: 6.7 K/UL (ref 4.8–10.8)

## 2021-05-28 ENCOUNTER — VIRTUAL VISIT (OUTPATIENT)
Dept: BEHAVIORAL/MENTAL HEALTH CLINIC | Age: 31
End: 2021-05-28
Payer: COMMERCIAL

## 2021-05-28 DIAGNOSIS — F34.1 PERSISTENT DEPRESSIVE DISORDER WITH ANXIOUS DISTRESS, CURRENTLY MODERATE: Primary | ICD-10-CM

## 2021-05-28 PROCEDURE — 1036F TOBACCO NON-USER: CPT | Performed by: PSYCHOLOGIST

## 2021-05-28 PROCEDURE — 90832 PSYTX W PT 30 MINUTES: CPT | Performed by: PSYCHOLOGIST

## 2021-05-28 ASSESSMENT — PATIENT HEALTH QUESTIONNAIRE - PHQ9
SUM OF ALL RESPONSES TO PHQ QUESTIONS 1-9: 21
4. FEELING TIRED OR HAVING LITTLE ENERGY: 3
7. TROUBLE CONCENTRATING ON THINGS, SUCH AS READING THE NEWSPAPER OR WATCHING TELEVISION: 2
9. THOUGHTS THAT YOU WOULD BE BETTER OFF DEAD, OR OF HURTING YOURSELF: 2
SUM OF ALL RESPONSES TO PHQ QUESTIONS 1-9: 19
5. POOR APPETITE OR OVEREATING: 2
6. FEELING BAD ABOUT YOURSELF - OR THAT YOU ARE A FAILURE OR HAVE LET YOURSELF OR YOUR FAMILY DOWN: 2
3. TROUBLE FALLING OR STAYING ASLEEP: 2
2. FEELING DOWN, DEPRESSED OR HOPELESS: 3
8. MOVING OR SPEAKING SO SLOWLY THAT OTHER PEOPLE COULD HAVE NOTICED. OR THE OPPOSITE, BEING SO FIGETY OR RESTLESS THAT YOU HAVE BEEN MOVING AROUND A LOT MORE THAN USUAL: 2

## 2021-05-28 NOTE — PROGRESS NOTES
Behavioral Health Consultation  Sb Baig Psy.D. Psychologist  5/28/21  2:35 PM EST      Time spent with Patient:  23 minutes  This is patient's 21st  [facility] appointment. Reason for Consult:  depression  Referring Provider: Rebekah Hernandez MD      Feedback given to PCP. TELEHEALTH EVALUATION -- Audio and/or Visual (During FDYMB-62 public health emergency)    Due to COVID 19 outbreak, patient's office visit was converted to a virtual visit. Patient was contacted and agreed to proceed with a virtual visit via [employer]. Patient reports that they are located at home. Provider located at home office. The risks and benefits of converting to a virtual visit were discussed in light of the current infectious disease epidemic. Patient also understood that insurance coverage and co-pays are up to their individual insurance plans. Patient provides verbal consent for this visit to be billed to insurance. Pursuant to the emergency declaration under the [facility] waiver authority and the [employer] Act, this Virtual  Visit was conducted, with patient's consent, to reduce the patient's risk of exposure to COVID-19 and provide continuity of care for an established patient. Services were provided through an audio and video synchronous discussion virtually to substitute for in-person clinic visit. S:  Pt reports she has a few days of work left and will then be off for summer. Pt reports that her son has been gone for 3 weeks visiting her father which she is upset with. Pt reports she has talked to her father a couple of times during this time. Is still not really talking with her mom. She has not been talking or seeing friends much, notes that they seem to be depressed as well. Pt describes feeling that she is overwhelmed with her son and feels that she is \"unable\" to take care of him on her own.  Notes that this in part related to her difficulty with being the only adult in the home and her difficulty managing his behavior  Pt feels that due to this she cannot have equal say in things with her mom. Pt shared about current romantic relationship. Notes she is unsure about whether he is able to give her what she would like in a relationship, but also notes the impact of her fears of abandonment in terms of changing things or moving on. Acknowledges that she is able to maintain friendships for many years, and is aware that these issues seem to be specifically related to romantic relationships. O:  MSE:    Appearance    alert, cooperative   Personal Hygiene : appropriately dressed, appropriately groomed and healthy looking  Appetite abnormal: inc  Sleep disturbance Yes, including: non-restful sleep  Fatigue Yes  Loss of pleasure Yes  Impulsive behavior No  Speech    spontaneous, normal rate and normal volume  Mood   depressed   Affect    depressed affect  Thought Content    intact  Thought Process    linear, goal directed and coherent  Associations    logical connections  Insight   fair  Judgment   good  Orientation    oriented to person, place, time, and general circumstances  Memory    recent and remote memory intact  Attention/Concentration    intact  Morbid ideation yes  Suicide Assessment    no suicidal ideation    History:    Medications:   Current Outpatient Medications   Medication Sig Dispense Refill    fluticasone (FLOVENT HFA) 220 MCG/ACT inhaler Inhale 2 puffs into the lungs 2 times daily 1 Inhaler 1    pregabalin (LYRICA) 25 MG capsule Take 25 mg by mouth 2 times daily.       tiZANidine (ZANAFLEX) 2 MG tablet Take 2 mg by mouth every 6 hours as needed      VENTOLIN  (90 Base) MCG/ACT inhaler inhale 1 puffs by mouth EVERY 6 HOURS AS NEEDED FOR WHEEZING OR SHORTNESS OF BREATH 3 Inhaler 3     Current Facility-Administered Medications   Medication Dose Route Frequency Provider Last Rate Last Admin    levonorgestrel (MIRENA) IUD 52 mg 1 each  1 each Intrauterine Once Erinn Gallego MD   1 each at 10/27/20 7703       Social History:   Social History     Socioeconomic History    Marital status: Single     Spouse name: Not on file    Number of children: Not on file    Years of education: Not on file    Highest education level: Not on file   Occupational History    Not on file   Tobacco Use    Smoking status: Never Smoker    Smokeless tobacco: Never Used   Substance and Sexual Activity    Alcohol use: Not Currently     Alcohol/week: 0.0 standard drinks    Drug use: Yes     Types: Marijuana     Comment: vape daily    Sexual activity: Yes     Partners: Male   Other Topics Concern    Not on file   Social History Narrative    Not on file     Social Determinants of Health     Financial Resource Strain: Medium Risk    Difficulty of Paying Living Expenses: Somewhat hard   Food Insecurity: No Food Insecurity    Worried About Running Out of Food in the Last Year: Never true    Kylah of Food in the Last Year: Never true   Transportation Needs: No Transportation Needs    Lack of Transportation (Medical): No    Lack of Transportation (Non-Medical): No   Physical Activity:     Days of Exercise per Week:     Minutes of Exercise per Session:    Stress:     Feeling of Stress :    Social Connections:     Frequency of Communication with Friends and Family:     Frequency of Social Gatherings with Friends and Family:     Attends Jehovah's witness Services:     Active Member of Clubs or Organizations:     Attends Club or Organization Meetings:     Marital Status:    Intimate Partner Violence:     Fear of Current or Ex-Partner:     Emotionally Abused:     Physically Abused:     Sexually Abused:        TOBACCO:   reports that she has never smoked. She has never used smokeless tobacco.  ETOH:   reports previous alcohol use.     Family History:   Family History   Problem Relation Age of Onset    Depression Mother     Arthritis Mother         TKR    High Blood Pressure Father         No congenital heart problems, no aneurysms.  Diabetes Father         Lisa Baig might have had colon cancer she will update me.  Arthritis Maternal Grandmother     Cancer Maternal Grandmother         Unsure    Diabetes Maternal Grandmother     Arthritis Paternal Grandmother     Diabetes Paternal Grandmother     Stroke Paternal Grandmother     Cancer Paternal Grandfather         Colon? ? She's not sure     Arthritis Sister     No Known Problems Brother     Other Son         pyloric stenosis / OR at 10 weeks age         A:  Administered the PHQ9 which indicates a self report of severe symptom distress. Pt reports ongoing depression and anhedonia. Expresses significant negative beliefs about self. Pt would benefit from [facility] services to increase coping skills to provide symptom management/control/relief. Discussed again specialty mental health and informed patient that we would meet until early August and then would need to transition to specialty mental health or take a break. Provided recommendation regarding specialty mental and DBT group. Pt is reluctant to try the DBT group as she is uncomfortable with the time commitment. Also expressed reluctance about specialty mental health as well.        PHQ Scores 5/28/2021 5/4/2021 4/8/2021 3/23/2021 2/24/2021 2/9/2021 1/20/2021   PHQ2 Score 6 6 5 4 5 5 5   PHQ9 Score 21 24 18 18 22 21 21     Interpretation of Total Score Depression Severity: 1-4 = Minimal depression, 5-9 = Mild depression, 10-14 = Moderate depression, 15-19 = Moderately severe depression, 20-27 = Severe depression      Diagnosis:    Persistent Depressive Disorder with Anxious Distress      Diagnosis Date    Arthritis     spine & fingers    Asthma     since childhood    Chronic back pain     Chronic headaches     Depression     Depression     PONV (postoperative nausea and vomiting)     Postpartum

## 2021-06-12 ENCOUNTER — HOSPITAL ENCOUNTER (INPATIENT)
Age: 31
LOS: 3 days | Discharge: HOME | DRG: 885 | End: 2021-06-15
Attending: EMERGENCY MEDICINE | Admitting: PSYCHIATRY & NEUROLOGY
Payer: COMMERCIAL

## 2021-06-12 DIAGNOSIS — F32.9 MAJOR DEPRESSIVE DISORDER WITH CURRENT ACTIVE EPISODE, UNSPECIFIED DEPRESSION EPISODE SEVERITY, UNSPECIFIED WHETHER RECURRENT: Primary | ICD-10-CM

## 2021-06-12 PROBLEM — F33.9 MAJOR DEPRESSION, RECURRENT (HCC): Status: ACTIVE | Noted: 2021-06-12

## 2021-06-12 LAB
ACETAMINOPHEN LEVEL: <5 UG/ML (ref 10–30)
ALBUMIN SERPL-MCNC: 5 G/DL (ref 3.5–4.6)
ALP BLD-CCNC: 63 U/L (ref 40–130)
ALT SERPL-CCNC: 14 U/L (ref 0–33)
AMPHETAMINE SCREEN, URINE: ABNORMAL
ANION GAP SERPL CALCULATED.3IONS-SCNC: 13 MEQ/L (ref 9–15)
AST SERPL-CCNC: 11 U/L (ref 0–35)
BACTERIA: ABNORMAL /HPF
BARBITURATE SCREEN URINE: ABNORMAL
BASOPHILS ABSOLUTE: 0 K/UL (ref 0–0.2)
BASOPHILS RELATIVE PERCENT: 0.5 %
BENZODIAZEPINE SCREEN, URINE: ABNORMAL
BILIRUB SERPL-MCNC: 0.6 MG/DL (ref 0.2–0.7)
BILIRUBIN URINE: NEGATIVE
BLOOD, URINE: NEGATIVE
BUN BLDV-MCNC: 14 MG/DL (ref 6–20)
CALCIUM SERPL-MCNC: 10.3 MG/DL (ref 8.5–9.9)
CANNABINOID SCREEN URINE: POSITIVE
CHLORIDE BLD-SCNC: 106 MEQ/L (ref 95–107)
CHOLESTEROL, TOTAL: 186 MG/DL (ref 0–199)
CLARITY: ABNORMAL
CO2: 24 MEQ/L (ref 20–31)
COCAINE METABOLITE SCREEN URINE: ABNORMAL
COLOR: ABNORMAL
CREAT SERPL-MCNC: 0.73 MG/DL (ref 0.5–0.9)
EOSINOPHILS ABSOLUTE: 0 K/UL (ref 0–0.7)
EOSINOPHILS RELATIVE PERCENT: 0.4 %
EPITHELIAL CELLS, UA: >100 /HPF (ref 0–5)
ETHANOL PERCENT: NORMAL G/DL
ETHANOL: <10 MG/DL (ref 0–0.08)
GFR AFRICAN AMERICAN: >60
GFR NON-AFRICAN AMERICAN: >60
GLOBULIN: 2.9 G/DL (ref 2.3–3.5)
GLUCOSE BLD-MCNC: 82 MG/DL (ref 70–99)
GLUCOSE URINE: NEGATIVE MG/DL
HCG(URINE) PREGNANCY TEST: NEGATIVE
HCT VFR BLD CALC: 45.5 % (ref 37–47)
HDLC SERPL-MCNC: 36 MG/DL (ref 40–59)
HEMOGLOBIN: 15.3 G/DL (ref 12–16)
KETONES, URINE: >=80 MG/DL
LDL CHOLESTEROL CALCULATED: 138 MG/DL (ref 0–129)
LEUKOCYTE ESTERASE, URINE: ABNORMAL
LYMPHOCYTES ABSOLUTE: 1.7 K/UL (ref 1–4.8)
LYMPHOCYTES RELATIVE PERCENT: 20.7 %
Lab: ABNORMAL
MCH RBC QN AUTO: 30.8 PG (ref 27–31.3)
MCHC RBC AUTO-ENTMCNC: 33.6 % (ref 33–37)
MCV RBC AUTO: 91.5 FL (ref 82–100)
METHADONE SCREEN, URINE: ABNORMAL
MONOCYTES ABSOLUTE: 0.3 K/UL (ref 0.2–0.8)
MONOCYTES RELATIVE PERCENT: 4 %
NEUTROPHILS ABSOLUTE: 6 K/UL (ref 1.4–6.5)
NEUTROPHILS RELATIVE PERCENT: 74.4 %
NITRITE, URINE: NEGATIVE
OPIATE SCREEN URINE: ABNORMAL
OXYCODONE URINE: ABNORMAL
PDW BLD-RTO: 12.8 % (ref 11.5–14.5)
PH UA: 6 (ref 5–9)
PHENCYCLIDINE SCREEN URINE: ABNORMAL
PLATELET # BLD: 234 K/UL (ref 130–400)
POTASSIUM SERPL-SCNC: 4.1 MEQ/L (ref 3.4–4.9)
PROPOXYPHENE SCREEN: ABNORMAL
PROTEIN UA: 100 MG/DL
RBC # BLD: 4.97 M/UL (ref 4.2–5.4)
RBC UA: ABNORMAL /HPF (ref 0–2)
SALICYLATE, SERUM: <0.3 MG/DL (ref 15–30)
SARS-COV-2, NAAT: NOT DETECTED
SODIUM BLD-SCNC: 143 MEQ/L (ref 135–144)
SPECIFIC GRAVITY UA: 1.03 (ref 1–1.03)
TOTAL CK: 32 U/L (ref 0–170)
TOTAL PROTEIN: 7.9 G/DL (ref 6.3–8)
TRIGL SERPL-MCNC: 59 MG/DL (ref 0–150)
TSH SERPL DL<=0.05 MIU/L-ACNC: 0.65 UIU/ML (ref 0.44–3.86)
URINE REFLEX TO CULTURE: YES
UROBILINOGEN, URINE: 1 E.U./DL
WBC # BLD: 8.1 K/UL (ref 4.8–10.8)
WBC UA: >100 /HPF (ref 0–5)

## 2021-06-12 PROCEDURE — 93005 ELECTROCARDIOGRAM TRACING: CPT | Performed by: PSYCHIATRY & NEUROLOGY

## 2021-06-12 PROCEDURE — 84703 CHORIONIC GONADOTROPIN ASSAY: CPT

## 2021-06-12 PROCEDURE — 80053 COMPREHEN METABOLIC PANEL: CPT

## 2021-06-12 PROCEDURE — 1240000000 HC EMOTIONAL WELLNESS R&B

## 2021-06-12 PROCEDURE — 81001 URINALYSIS AUTO W/SCOPE: CPT

## 2021-06-12 PROCEDURE — 80307 DRUG TEST PRSMV CHEM ANLYZR: CPT

## 2021-06-12 PROCEDURE — 6370000000 HC RX 637 (ALT 250 FOR IP): Performed by: PSYCHIATRY & NEUROLOGY

## 2021-06-12 PROCEDURE — 82077 ASSAY SPEC XCP UR&BREATH IA: CPT

## 2021-06-12 PROCEDURE — 36415 COLL VENOUS BLD VENIPUNCTURE: CPT

## 2021-06-12 PROCEDURE — 82550 ASSAY OF CK (CPK): CPT

## 2021-06-12 PROCEDURE — 80143 DRUG ASSAY ACETAMINOPHEN: CPT

## 2021-06-12 PROCEDURE — 87086 URINE CULTURE/COLONY COUNT: CPT

## 2021-06-12 PROCEDURE — 80179 DRUG ASSAY SALICYLATE: CPT

## 2021-06-12 PROCEDURE — 80061 LIPID PANEL: CPT

## 2021-06-12 PROCEDURE — 84443 ASSAY THYROID STIM HORMONE: CPT

## 2021-06-12 PROCEDURE — 85025 COMPLETE CBC W/AUTO DIFF WBC: CPT

## 2021-06-12 PROCEDURE — 99284 EMERGENCY DEPT VISIT MOD MDM: CPT

## 2021-06-12 RX ORDER — BENZTROPINE MESYLATE 1 MG/ML
2 INJECTION INTRAMUSCULAR; INTRAVENOUS 2 TIMES DAILY PRN
Status: DISCONTINUED | OUTPATIENT
Start: 2021-06-12 | End: 2021-06-15 | Stop reason: HOSPADM

## 2021-06-12 RX ORDER — ACETAMINOPHEN 325 MG/1
650 TABLET ORAL EVERY 4 HOURS PRN
Status: DISCONTINUED | OUTPATIENT
Start: 2021-06-12 | End: 2021-06-15 | Stop reason: HOSPADM

## 2021-06-12 RX ORDER — MAGNESIUM HYDROXIDE/ALUMINUM HYDROXICE/SIMETHICONE 120; 1200; 1200 MG/30ML; MG/30ML; MG/30ML
30 SUSPENSION ORAL PRN
Status: DISCONTINUED | OUTPATIENT
Start: 2021-06-12 | End: 2021-06-15 | Stop reason: HOSPADM

## 2021-06-12 RX ORDER — ALBUTEROL SULFATE 90 UG/1
2 AEROSOL, METERED RESPIRATORY (INHALATION) EVERY 6 HOURS PRN
Status: DISCONTINUED | OUTPATIENT
Start: 2021-06-12 | End: 2021-06-15 | Stop reason: HOSPADM

## 2021-06-12 RX ORDER — TIZANIDINE 2 MG/1
2 TABLET ORAL NIGHTLY
Status: DISCONTINUED | OUTPATIENT
Start: 2021-06-12 | End: 2021-06-15 | Stop reason: HOSPADM

## 2021-06-12 RX ORDER — PREGABALIN 25 MG/1
25 CAPSULE ORAL 2 TIMES DAILY
Status: DISCONTINUED | OUTPATIENT
Start: 2021-06-12 | End: 2021-06-15 | Stop reason: HOSPADM

## 2021-06-12 RX ORDER — FLUTICASONE PROPIONATE 110 UG/1
4 AEROSOL, METERED RESPIRATORY (INHALATION) 2 TIMES DAILY
Status: DISCONTINUED | OUTPATIENT
Start: 2021-06-12 | End: 2021-06-15 | Stop reason: HOSPADM

## 2021-06-12 RX ADMIN — PREGABALIN 25 MG: 25 CAPSULE ORAL at 22:24

## 2021-06-12 RX ADMIN — TIZANIDINE 2 MG: 2 TABLET ORAL at 22:24

## 2021-06-12 ASSESSMENT — SLEEP AND FATIGUE QUESTIONNAIRES
DIFFICULTY STAYING ASLEEP: YES
DIFFICULTY ARISING: NO
RESTFUL SLEEP: NO
DIFFICULTY FALLING ASLEEP: YES
DO YOU USE A SLEEP AID: YES
DO YOU HAVE DIFFICULTY SLEEPING: YES

## 2021-06-12 ASSESSMENT — ENCOUNTER SYMPTOMS
NAUSEA: 0
BACK PAIN: 0
SORE THROAT: 0
SHORTNESS OF BREATH: 0
VOMITING: 0
ABDOMINAL PAIN: 0
COUGH: 0
DIARRHEA: 0
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
## 2021-06-12 NOTE — ED NOTES
Fredi called and stated that this pt was assessed at her home after her mother called the [facility] hotline to state her daughter needed to be assessed. Pt's mother states she has been sending texts her numerous times today stating she is going to kill herself. She refused to tell Fredi and was vague about any plan. Pt had an attempt 6 years ago her last attempt. Pt denied a bed at [facility] and does not want to be hospitalized. Nathan de la rosa sheeted the pt to the ER. Pt has lost weight, not eating, feels worthless, is sleeping 12 hours a day, not showering, poor ADL's.   She is not open with [facility] no H/I and no psychosis per [facility],[address], RN  06/12/21 1722

## 2021-06-12 NOTE — ED PROVIDER NOTES
Substances: THC   Substance and Sexual Activity    Alcohol use: Not Currently     Alcohol/week: 0.0 standard drinks    Drug use: Yes     Types: Marijuana     Comment: vape daily    Sexual activity: Yes     Partners: Male   Other Topics Concern    Not on file   Social History Narrative    Not on file     Social Determinants of Health     Financial Resource Strain: Medium Risk    Difficulty of Paying Living Expenses: Somewhat hard   Food Insecurity: No Food Insecurity    Worried About Running Out of Food in the Last Year: Never true    Kylah of Food in the Last Year: Never true   Transportation Needs: No Transportation Needs    Lack of Transportation (Medical): No    Lack of Transportation (Non-Medical): No   Physical Activity:     Days of Exercise per Week:     Minutes of Exercise per Session:    Stress:     Feeling of Stress :    Social Connections:     Frequency of Communication with Friends and Family:     Frequency of Social Gatherings with Friends and Family:     Attends Latter day Services:     Active Member of Clubs or Organizations:     Attends Club or Organization Meetings:     Marital Status:    Intimate Partner Violence:     Fear of Current or Ex-Partner:     Emotionally Abused:     Physically Abused:     Sexually Abused:          PHYSICAL EXAM       ED Triage Vitals [06/12/21 1632]   BP Temp Temp Source Pulse Resp SpO2 Height Weight   (!) 118/99 98 °F (36.7 °C) Oral 122 20 97 % 5' 6\" (1.676 m) 158 lb (71.7 kg)     EKG:  NSR, rate 90, normal intervals, no ST elevation/ depression    Physical Exam  Vitals and nursing note reviewed. Constitutional:       Appearance: She is well-developed. HENT:      Head: Normocephalic. Right Ear: External ear normal.      Left Ear: External ear normal.   Eyes:      Conjunctiva/sclera: Conjunctivae normal.      Pupils: Pupils are equal, round, and reactive to light. Cardiovascular:      Rate and Rhythm: Normal rate and regular rhythm. Heart sounds: Normal heart sounds. Pulmonary:      Effort: Pulmonary effort is normal.      Breath sounds: Normal breath sounds. Abdominal:      General: Bowel sounds are normal. There is no distension. Palpations: Abdomen is soft. Tenderness: There is no abdominal tenderness. Musculoskeletal:         General: Normal range of motion. Cervical back: Normal range of motion and neck supple. Skin:     General: Skin is warm and dry. Neurological:      Mental Status: She is alert and oriented to person, place, and time. Psychiatric:      Comments: Flat affect         MDM  30 yo female presents to the ED with depression. Pt is afebrile, hemodynamically stable. Medically cleared, plan to admit dx: major depression          FINAL IMPRESSION      1.  Major depressive disorder with current active episode, unspecified depression episode severity, unspecified whether recurrent          DISPOSITION/PLAN   DISPOSITION Admitted 06/12/2021 09:11:03 PM        DISCHARGE MEDICATIONS:  [unfilled]         Shantanu Ye MD(electronically signed)  Attending Emergency Physician            Shantanu Ye MD  06/12/21 0233

## 2021-06-12 NOTE — ED TRIAGE NOTES
Patient pink slip by MERY for suicidal ideations. MERY was called by patients mother. Patient denies suicidal ideations and states \"my mom is just mad because I was texting her and she was at at wedding. \" Patient reports that she is sleeping 12 hours a day and has not showered for 4 days.

## 2021-06-12 NOTE — ED NOTES
Pt changed into [facility] clothing checked for contraband. Pt cooperative.       Venkatesh Hernandez RN  06/12/21 0678

## 2021-06-12 NOTE — ED TRIAGE NOTES
Pt complains that friend of 17 yrs blocked her a week ago and she does not know why. Pt states she reached out to her mom who just ignored her which made her more upset and made her feel like she didn't care so she texted words she should not have eluding that she may not be around. Pt is denying SI or self-harm and admits that she was just trying to get her mom's attention.

## 2021-06-12 NOTE — ED NOTES
Lab at bedside to draw blood work. Pt cooperative.  Tolerated well     Kwabena Pate RN  06/12/21 3921

## 2021-06-13 PROCEDURE — 94761 N-INVAS EAR/PLS OXIMETRY MLT: CPT

## 2021-06-13 PROCEDURE — 94640 AIRWAY INHALATION TREATMENT: CPT

## 2021-06-13 PROCEDURE — 6370000000 HC RX 637 (ALT 250 FOR IP): Performed by: PSYCHIATRY & NEUROLOGY

## 2021-06-13 PROCEDURE — 94664 DEMO&/EVAL PT USE INHALER: CPT

## 2021-06-13 PROCEDURE — 1240000000 HC EMOTIONAL WELLNESS R&B

## 2021-06-13 PROCEDURE — 6370000000 HC RX 637 (ALT 250 FOR IP): Performed by: NURSE PRACTITIONER

## 2021-06-13 RX ORDER — MECOBALAMIN 5000 MCG
10 TABLET,DISINTEGRATING ORAL NIGHTLY
Status: DISCONTINUED | OUTPATIENT
Start: 2021-06-14 | End: 2021-06-13

## 2021-06-13 RX ORDER — MECOBALAMIN 5000 MCG
10 TABLET,DISINTEGRATING ORAL NIGHTLY
Status: DISCONTINUED | OUTPATIENT
Start: 2021-06-13 | End: 2021-06-15 | Stop reason: HOSPADM

## 2021-06-13 RX ORDER — MECOBALAMIN 5000 MCG
5 TABLET,DISINTEGRATING ORAL NIGHTLY
Status: DISCONTINUED | OUTPATIENT
Start: 2021-06-13 | End: 2021-06-13

## 2021-06-13 RX ADMIN — TIZANIDINE 2 MG: 2 TABLET ORAL at 21:31

## 2021-06-13 RX ADMIN — PREGABALIN 25 MG: 25 CAPSULE ORAL at 21:31

## 2021-06-13 RX ADMIN — ALBUTEROL SULFATE 2 PUFF: 90 AEROSOL, METERED RESPIRATORY (INHALATION) at 07:26

## 2021-06-13 RX ADMIN — Medication 5 MG: at 01:58

## 2021-06-13 RX ADMIN — FLUTICASONE PROPIONATE 4 PUFF: 110 AEROSOL, METERED RESPIRATORY (INHALATION) at 19:38

## 2021-06-13 RX ADMIN — PREGABALIN 25 MG: 25 CAPSULE ORAL at 08:39

## 2021-06-13 RX ADMIN — FLUTICASONE PROPIONATE 4 PUFF: 110 AEROSOL, METERED RESPIRATORY (INHALATION) at 07:26

## 2021-06-13 RX ADMIN — Medication 10 MG: at 22:37

## 2021-06-13 RX ADMIN — ACETAMINOPHEN 650 MG: 325 TABLET ORAL at 00:26

## 2021-06-13 ASSESSMENT — PAIN SCALES - GENERAL
PAINLEVEL_OUTOF10: 5
PAINLEVEL_OUTOF10: 2

## 2021-06-13 ASSESSMENT — LIFESTYLE VARIABLES: HISTORY_ALCOHOL_USE: NO

## 2021-06-13 NOTE — ED NOTES
Call placed to [employer] for patient belongings and escort to .       Del Kanner, RN  06/12/21 2127

## 2021-06-13 NOTE — CARE COORDINATION
[facility] Biopsychosocial Assessment    Current Level of Psychosocial Functioning     Independent   Dependent    Minimal Assist x    Comments:  Patient is employed and lives on her own. She received benefits from the government to maintain a reasonable quality of life. Psychosocial High Risk Factors (check all that apply)    Unable to obtain meds   Chronic illness/pain    Substance abuse   Lack of Family Support x  Financial stress   Isolation   Inadequate Community Resources x  Suicide attempt(s)  Not taking medications x  Victim of crime   Developmental Delay  Unable to manage personal needs    Age 72 or older   Homeless  No transportation   Readmission within 30 days  Unemployment  Traumatic Event    Comments: Patient has at least three high risk factors associated with this admission. Psychiatric Advanced Directives: None Reported. Family to Involve in Treatment: Patient stated she has no one for staff to contact on her behalf. Sexual Orientation:  Patient is in neither a hetero or homosexual relationship at this time. Patient Strengths: Patient was cooperative and engaging. Patient Barriers: Patient needs to be enrolled in community mental health services. Opiate Education Provided:  N/A    CMHC/mental health history: None Reported. Plan of Care   medication management, group/individual therapies, family meetings, psycho -education, treatment team meetings to assist with stabilization    Initial Discharge Plan:  Patient will return home and follow the recommendations of the treatment team.      Clinical Summary:    Patient is a 31year old female who was admitted to the [facility] due to severe depression. Reportedly, patient has been struggling with depression since childhood. Recently, patient sent text messages to her mother threatening to harm herself. When interviewed, patient stated she sent the messages to get her mother's attention. Patient blamed her mother for this admission.  She does not think she needs to be here. Patient verbalized feeling no support from family just friends. She does not have a mental provider in the community and she is unsure if she needs one. Patient denied any problems with drugs or alcohol and was compliant with completing a safety plan.      Electronically signed by Lee Ann Hankins on 6/13/2021 at 10:40 AM

## 2021-06-13 NOTE — PROGRESS NOTES
Pt is A & O X4; pleasant & cooperative. States depression is #6/10., anxiety is 5-6/10. States she feels so much better since admission. Denies SI/HI/AVH. Goes to all groups. States she sleeps 9 hrs per night. States she had Covid 6 mos ago so she get nauseous when eating. Will shower tomorrow.

## 2021-06-13 NOTE — PROGRESS NOTES
Pt. attended the 0900 community meeting. Electronically signed by Aixa Degroot, [address] on 6/13/2021 at 9:18 AM

## 2021-06-13 NOTE — PROGRESS NOTES
Pt states she did not sleep at all. When informed that she appeared to be sleeping from [id number], pt then states \"I guess I must have slept a little bit then\". Pt sitting in dayroom working on puzzle.

## 2021-06-13 NOTE — PROGRESS NOTES
[facility] AT [place] Respiratory Therapy Evaluation   Current Order: albuterol q6 prn     Home Regimen:     Ordering Physician:   Re-evaluation Date:      Diagnosis:     Patient Status: Stable    The following MDI Criteria must be met in order to convert aerosol to MDI with spacer. If unable to meet, MDI will be converted to aerosol:  []  Patient able to demonstrate the ability to use MDI effectively  []  Patient alert and cooperative  []  Patient able to take deep breath with 5-10 second hold  []  Medication(s) available in this delivery method   []  Peak flow greater than or equal to 200 ml/min            Current Order Substituted To  (same drug, same frequency)   Aerosol to MDI [] Albuterol Sulfate 0.083% unit dose by aerosol Albuterol Sulfate MDI 2 puffs by inhalation with spacer    [] Levalbuterol 1.25 mg unit dose by aerosol Levalbuterol MDI 2 puffs by inhalation with spacer    [] Levalbuterol 0.63 mg unit dose by aerosol Levalbuterol MDI 2 puffs by inhalation with spacer    [] Ipratropium Bromide 0.02% unit dose by aerosol Ipratropium Bromide MDI 2 puffs by inhalation with spacer    [] Duoneb (Ipratropium + Albuterol) unit dose by aerosol Ipratropium MDI + Albuterol MDI 2 puffs by inhalation w/spacer   MDI to Aerosol [] Albuterol Sulfate MDI Albuterol Sulfate 0.083% unit dose by aerosol    [] Levalbuterol MDI 2 puffs by inhalation Levalbuterol 1.25 mg unit dose by aerosol    [] Ipratropium Bromide MDI by inhalation Ipratropium Bromide 0.02% unit dose by aerosol    [] Combivent (Ipratropium + Albuterol) MDI by inhalation Duoneb (Ipratropium + Albuterol) unit dose by aerosol       Treatment Assessment [Frequency/Schedule]:  Change frequency to: _______no change___________________________________________per Protocol, P&T, MEC      Points 0 1 2 3 4   Pulmonary Status  Non-Smoker  []   Smoking history   < 20 pack years  [x]   Smoking history  ?  20 pack years  []   Pulmonary Disorder  (acute or chronic)  []   Severe or Chronic w/ Exacerbation  []     Surgical Status No [x]   Surgeries     General []   Surgery Lower []   Abdominal Thoracic or []   Upper Abdominal Thoracic with  PulmonaryDisorder  []     Chest X-ray Clear/Not  Ordered     [x]  Chronic Changes  Results Pending  []  Infiltrates, atelectasis, pleural effusion, or edema  []  Infiltrates in more than one lobe []  Infiltrate + Atelectasis, &/or pleural effusion  []    Respiratory Pattern Regular,  RR = 12-20 [x]  Increased,  RR = 21-25 []  HIGGINBOTHAM, irregular,  or RR = 26-30 []  Decreased FEV1  or RR = 31-35 []  Severe SOB, use  of accessory muscles, or RR ? 35  []    Mental Status Alert, oriented,  Cooperative [x]  Confused but Follows commands []  Lethargic or unable to follow commands []  Obtunded  []  Comatose  []    Breath Sounds Clear to  auscultation  [x]  Decreased unilaterally or  in bases only []  Decreased  bilaterally  []  Crackles or intermittent wheezes []  Wheezes []    Cough Strong, Spontan., & nonproductive [x]  Strong,  spontaneous, &  productive []  Weak,  Nonproductive []  Weak, productive or  with wheezes []  No spontaneous  cough or may require suctioning []    Level of Activity Ambulatory [x]  Ambulatory w/ Assist  []  Non-ambulatory []  Paraplegic []  Quadriplegic []    Total    Score:___1____     Triage Score:____5____      Tri       Triage:     1. (>20) Freq: Q3    2. (16-20) Freq: Q4   3. (11-15) Freq: QID & Albuterol Q2 PRN    4. (6-10) Freq: TID & Albuterol Q2 PRN    5. (0-5) Freq Q4prn

## 2021-06-13 NOTE — PROGRESS NOTES
Pt continues to c/o insomnia despite no efforts made to try and lay down and rest. Pt requesting medication. Hospitalist notified with request for melatonin. Pt accepting of this and medicated per order. Encouraged pt to lay down in room and pt is agreeable.

## 2021-06-13 NOTE — FLOWSHEET NOTE
Patient is alert and orient x 4, out on unit with flat, sad affect. Patient admits to the depression but states, \"It is no worse than it has ever been. \" Patient states she can not take medications for it because of having a lot of side affects from them. Patient states she is scheduled for [facility] therapy that she is to start in august to help with the depression. Patient states she [place] been depressed all her life. \" Patient stated she only said what she did to get her mom's attention. She says \"her mom is not a very nice person and can be very spiteful. \"  Patient has a 1year old son and says her mom took custody of him when he was 7 months old, and recently shipped him off to her dads house in [place]. Patient is worried she could lose her job at [employer] for not being able to go tomorrow. Patient reports sleep \"is poor here, I need my own bed. \" Patient has poor appetite, states she had COVID in December and lost 30 pounds and can only eat small portions now. Patient denies any SI/HI/AVH, states she only attempted suicide once (2009) and she believes the break-up of boyfriend and the medication she was on is what caused it. Patient gets angry, upset and tearful when talking about her mom, she said her mom never even called yesterday to check on her, just called [facility] center and sent them the text she had sent her mom. Patient states, I am just done with her, I am cutting her off. \"

## 2021-06-13 NOTE — GROUP NOTE
Group Therapy Note    Date: 6/13/2021    Group Start Time: 1000  Group End Time: 1055  Group Topic: Psychoeducation    [facility] 3[facility] ANAIS Valentine        Group Therapy Note    Attendees: 8         Patient's Goal:  \"To sleep and work on going home\"    Notes:  Patient had a flat affect, she was quiet, kept to herself and work fairly on her task in group. Status After Intervention:  Unchanged    Participation Level:  Active Listener    Participation Quality: Appropriate      Speech:  quiet      Thought Process/Content: Linear      Affective Functioning: Flat      Mood: calm      Level of consciousness:  Alert      Response to Learning: Progressing to goal      Endings: None Reported    Modes of Intervention: Education, Socialization and Activity      Discipline Responsible: Psychoeducational Specialist      Signature:  Cristobal Hsu

## 2021-06-13 NOTE — GROUP NOTE
Group Therapy Note    Date: 6/13/2021    Group Start Time: 1100  Group End Time: 1610  Group Topic: Group Therapy    [facility]    Carline Fabry, LISW        Group Therapy Note    Attendees: 9         Patient's Goal:  To participate in a goal oriented group. Notes:  Patient stated her goal is to get better sleep. She stated she struggles with sleep when she is in unfamiliar places like [address] [place] [address].     Status After Intervention:  Unchanged    Participation Level: Active Listener    Participation Quality: Appropriate      Speech:  normal      Thought Process/Content: Logical      Affective Functioning: Congruent      Mood: anxious      Level of consciousness:  Alert      Response to Learning: Able to verbalize current knowledge/experience      Endings: None Reported    Modes of Intervention: Education      Discipline Responsible: /Counselor      Signature:   Carline Fabry, LISW

## 2021-06-13 NOTE — PROGRESS NOTES
Pt preoccupied with anger toward mother, wanting discharged, and inability to sleep. One on one time spent with pt, comfort measures provided. Pt states she's used to talking to her friends online until she falls asleep. Requesting another Zanaflex. Offered pt PRN tylenol for c/o headache and back aches which pt accepted. Pt given paper and pencil and encouraged to write down feelings. Pt encouraged to utilize sensory room and is agreeable.

## 2021-06-13 NOTE — ED NOTES
Patient reviewed with Dr. Edilma Serrano. Reviewed all labs, EKGs, home medications, current behavior and events leading to admit. Received order to admit to 3W. No haldol, vistaril or trazadone ordered at this time and MD will reevaluate after being seen tomorrow. Home medications continued and verified with patient.       Negro Echeverria RN  06/12/21 2112

## 2021-06-13 NOTE — SUICIDE SAFETY PLAN
SAFETY PLAN    A suicide Safety Plan is a document that supports someone when they are having thoughts of suicide. Warning Signs that indicate a suicidal crisis may be developing: What (situations, thoughts, feelings, body sensations, behaviors, etc.) do you experience that lets you know you are beginning to think about suicide? 1. Ignored by her mother  2. Verbalizing thoughts of self harm  3. Attention seeking    Internal Coping Strategies:  What things can I do (relaxation techniques, hobbies, physical activities, etc.) to take my mind off my problems without contacting another person? 1. Spend time with cat  2. Watch TV  3. Look at [employer] and social settings that provide distraction: Who can I call or where can I go to distract me? 1. Name: Devin Hidalgo Phone: in the phone  2. Name: Ania Foster Phone: in the phone   3. Place: Spending time with friends           4. Place: Going to work    People whom I can ask for help: Who can I call when I need help - for example, friends, family, clergy, someone else? 1. Name: Jennifer Montano               Phone: in the phone  2. Name: Devin Hidalgo Phone: in the phone  3. Name: Ania Foster Phone: in the phone    Professionals or [address] [place] [address] I can contact during a crisis: Who can I call for help - for example, my doctor, my psychiatrist, my psychologist, a mental health provider, a suicide hotline? 1. Clinician Name: None. Phone: None. Clinician Pager or Emergency Contact #: None. 2. Suicide Prevention Lifeline: 0-636-113-TALK (7894)    3. [address] [place] [address] Emergency Services -  for example, [facility] suicide hotline, [address]      Emergency Services Address: [address] [place] [address]      Emergency Services Phone: [phone]    Making the environment safe: How can I make my environment (house/apartment/living space) safer? For example, can I remove guns, medications, and other items?   1. Patient feels safe living alone without weapons. 2. Patient feels safe living alone in her neighborhood.

## 2021-06-13 NOTE — H&P
Department of Psychiatry  TELE PSYCHIATRY/ VIRTUAL ASSESSMENT  History and Physical - Adult   THE PATIENT WAS SEEN THROUGH TELEPSYCHIATRY, IN A REAL-TIME, AUDIO-VIDEO ENCOUNTER, WITH THE PATIENT IN [place] [address], [address] Services  Medicare Certification Upon Admission    I certify that this patient's inpatient psychiatric hospital admission is medically necessary for:    [x] (1) Treatment which could reasonably be expected to improve this patient's condition,       [x] (2) Or for diagnostic study;     AND     [x](2) The inpatient psychiatric services are provided while the individual is under the care of a physician and are included in the individualized plan of care. Estimated length of stay/service 3-7 days depending on stability    Plan for post-hospital care follow up with outpatient provider    Electronically signed by Kareen Torre MD on 6/13/2021 at 7:58 PM        CHIEF COMPLAINT:  Depressed mood, suicidal statements    History obtained from:  patient, electronic medical record    Patient was seen after discussing with the treatment team and reviewing the chart\    CIRCUMSTANCES OF ADMISSION:   Ms. Eduardo Petersen is a 31 y.o. female with a history of depression, who was brought to the ER on a pink slip from the [facility] for suicidal threats and depression. Per ER notes, Jose Burrell is a 31 y.o. female per chart review has a h/o fibromyalgia, PCOS< depression/anxiety presents to the ED with depression. Pt notes gradual onset, moderate, constant, depression since she was a child. Pt has been sending texts that threaten to harm herself to her mother to get her attention. Pt states she is not SI but is just trying to get her mother's attention. Pt denies HI, AVH. Pt denies fever, n/v, cp, sob, dysuria, diarrhea\" and \"Pt behavior cooperative very tearful upset with Mom and Nathan for sending her to ED. Pt mood depressed. Affect sad.  Eye contact good Speech normal Thought process and content intact No psychotic features noted or reported. No delusions, paranoia, or hallucinations. Pt admits to depressive symptoms her whole life denies that they have worsen over the past 2 weeks. Pt denies SI/HI denies self-harm at this time. Per Alexandre Pozo pt had been texting mom all day SI. Mom referred pt for Alexandre Pozo to assess. Nathan pink slip pt to ED. Pt is reporting she was trying to get mom's attention because she has been ignoring her. Pt does identify some stressors of being alone, having only 1 friend, mom does not check on her and feels like she does not care, a wilver she was seeing a couple months blocked her last week and her best friend of 16 yrs blocked her last week for no reason. Pt is feeling very lonely and alone. Pt states she is prescribed medical marijuana for her fibromyalgia. Pt is currently not in treatment for [address] services and not taking any psychotropic medications. Pt reports she has had too many bad side effects from medications and can not take them. \"    HISTORY OF PRESENT ILLNESS:      The patient is a 31 y.o. female with significant past history of depression who was brought to the ER on a pink slip from the [facility] who was called by her mother after the patient had been sending her texts implying suicide. When interviewed today, the patient said she has 'normal depression' and was \"trying to talk to her mother\" and sent her texts; however she denied saying that she was going to commit suicide. She talked repeatedly about being upset with her mother who, she said, 'ignores her', and makes her upset. She talked about her mother who she feels is 'not loving\", and said she had sent the texts because she wanted the mother to talk to her. She said she was depressed 'all her life', and lost friends recently, due to increased depression. The patient reported that she \"said things like these for years, and she never did this\". She said she was sleeping OK at home, but not here.  She said her appetite had been poor since she had Covid in December. She said she had lost a lot of weight. She said she has not been taking any medications recently ,because she felt they were not working; she is scheduled to start rTMS in August. She said she had tried a lot of medications for depression over the years, since her childhood. She denied homicidal ideation. She denied hallucinations, paranoia or other delusions. Stressors: as above    The patient is not currently receiving care for the above psychiatric illness. Medications Prior to Admission:   Medications Prior to Admission: pregabalin (LYRICA) 25 MG capsule, Take 25 mg by mouth 2 times daily.   tiZANidine (ZANAFLEX) 2 MG tablet, Take 2 mg by mouth nightly   VENTOLIN  (90 Base) MCG/ACT inhaler, inhale 1 puffs by mouth EVERY 6 HOURS AS NEEDED FOR WHEEZING OR SHORTNESS OF BREATH  fluticasone (FLOVENT HFA) 220 MCG/ACT inhaler, Inhale 2 puffs into the lungs 2 times daily    Compliance: no    Psychiatric Review of Systems       Depression: yes     Josephine or Hypomania:  no     Panic Attacks:  no     Phobias:  no     Obsessions and Compulsions:  no     PTSD : no     Hallucinations:  no     Delusions:  no    Substance Abuse History:  ETOH: \"very rarely   Marijuana: yes  Opiates: no  Other Drugs: no      Past Psychiatric History:  Prior Diagnosis:  depression  Psychiatrist: no; however she said she last saw one \"2 weeks ago'  Therapist: not current; has tried in the past  Hospitalization: yes  Hx of Suicidal Attempts: yes, once, 12 years ago, \"after a bad breakup\"  Hx of violence:  no  ECT: no  Previous discontinued Psychiatric Med Trials: n/a    Past Medical History:        Diagnosis Date    Arthritis     spine & fingers    Asthma     since childhood    Chronic back pain     Chronic headaches     Depression     Depression     PONV (postoperative nausea and vomiting)     Postpartum depression 09/16/2017    Seizures ([facility] [address]     last seizure in . Pt reports seizure d/t medication reaction. Past Surgical History:        Procedure Laterality Date    APPENDECTOMY  2016    CERVICAL LAMINECTOMY Left 2019    POSTERIOR FORAMINOTOMIES C 5-6, C 6-7 LEFT performed by Caleb Zhou MD at [address] [place] [address] 22 N/A 2017     SECTION performed by Gracie Santos MD at [facility]    LAPAROSCOPIC APPENDECTOMY  2016    Dr Monica Mcgrath EXTRACTION         Allergies:   Citalopram, Effexor [venlafaxine], Fluvoxamine, Geodon [ziprasidone hcl], Metronidazole, Seasonal, Sertraline, Trintellix [vortioxetine], Vilazodone, Wellbutrin [bupropion], Adhesive tape, and Onion    Family History  Family History   Problem Relation Age of Onset    Depression Mother     Arthritis Mother         TKR    High Blood Pressure Father         No congenital heart problems, no aneurysms.  Diabetes Father         Jayashree Magana might have had colon cancer she will update me.  Arthritis Maternal Grandmother     Cancer Maternal Grandmother         Unsure    Diabetes Maternal Grandmother     Arthritis Paternal Grandmother     Diabetes Paternal Grandmother     Stroke Paternal Grandmother     Cancer Paternal Grandfather         Colon? ? She's not sure     Arthritis Sister     No Known Problems Brother     Other Son         pyloric stenosis / OR at 10 weeks age         Social History:  Born and Raised: [place]  Describes Childhood:   \"OK\"  Education: [employer]  Employment: Employed full time  Relationships: single  Children: one child, age 1; currently with her father, in [place]; the child is in her mother's custody  Current Support: none    Legal Hx: none  Access to weapons?:  No      EXAMINATION:    REVIEW OF SYSTEMS:    ROS:  [x] All negative/unchanged except if checked.  Explain positive(checked items) below:  [] Constitutional  [] Eyes  [] Ear/Nose/Mouth/Throat  [] Respiratory  [] CV  [] GI  []   [] Musculoskeletal  [] Skin/Breast  [] Neurological  [] Endocrine  [] Heme/Lymph  [] Allergic/Immunologic    Explanation:     Vitals:  /87   Pulse 109   Temp 98.2 °F (36.8 °C) (Oral)   Resp 16   Ht 5' 6\" (1.676 m)   Wt 158 lb (71.7 kg)   SpO2 98%   BMI 25.50 kg/m²      Neurologic Exam:   Muscle Strength & Tone: full ROM  Gait: normal gait   Involuntary Movements: No    Mental Status Examination:    Level of consciousness:  within normal limits   Appearance:  in chair and good grooming  Behavior/Motor:  no abnormalities noted  Attitude toward examiner:  fair eye contact and guarded  Speech:  spontaneous, normal rate, normal volume and well articulated   Mood: depressed and irritable  Affect:  mood congruent  Thought processes:  linear, goal directed and coherent   Thought content:  Preoccupied with discharge  Homocidal ideation denies  Suicidal Ideation:  denies suicidal ideation (but reportedly sent text messages that were implying suicide)  Delusions:  no evidence of delusions  Perceptual Disturbance:  denies any perceptual disturbance  Cognition:  oriented to person, place, and time   Concentration distractible  Memory intact  Insight limited   Judgement limited   Fund of Knowledge adequate          DIAGNOSIS:  Major Depressive Disorder, recurrent, severe without psychotic features      RISK ASSESSMENT:    SUICIDE RISK ASSESSMENT: high  HOMICIDE: low  AGITATION/VIOLENCE: low  ELOPEMENT: low    LABS: REVIEWED TODAY:  Recent Labs     06/12/21  1700   WBC 8.1   HGB 15.3        Recent Labs     06/12/21  1700      K 4.1      CO2 24   BUN 14   CREATININE 0.73   GLUCOSE 82     Recent Labs     06/12/21  1700   BILITOT 0.6   ALKPHOS 63   AST 11   ALT 14     Lab Results   Component Value Date    LABAMPH Neg 06/12/2021    BARBSCNU Neg 06/12/2021    LABBENZ Neg 06/12/2021    LABMETH Neg 06/12/2021    OPIATESCREENURINE Neg 06/12/2021    PHENCYCLIDINESCREENURINE Neg 06/12/2021    ETOH <10 06/12/2021     Lab Results

## 2021-06-13 NOTE — PROGRESS NOTES
Patient had a sad affect, was worrisome, upset and anxious. Patient stated she has always been depressed but denies the need to be here and is upset that she is here. Her mother call [facility] because patient kept texting her. Patient text her mother that she is not going to be around. Patient denies being suicidal and stated \"I just wanted attention from my mom and I just said that I am not going to be around like I was going somewhere. \"  Patient lives alone and is lonely. Patient's father move away. Her mother has custody of patient's 3years old son. Patient has relationship issues with both her mother and father. Patient works but stated she can not work full time because she is disable. She denies using drugs or drinking alcohol. She has a medical marijuana card for her fibromyalgia. She denies any auditory or visual hallucinations. She enjoys writing, playing [employer] on her phone, watching TV and doing art.  Electronically signed by Arely Ramirez [address] on 6/13/2021 at 6:22 AM

## 2021-06-14 LAB
EKG ATRIAL RATE: 90 BPM
EKG P AXIS: 48 DEGREES
EKG P-R INTERVAL: 122 MS
EKG Q-T INTERVAL: 388 MS
EKG QRS DURATION: 76 MS
EKG QTC CALCULATION (BAZETT): 474 MS
EKG R AXIS: 49 DEGREES
EKG T AXIS: 34 DEGREES
EKG VENTRICULAR RATE: 90 BPM
URINE CULTURE, ROUTINE: NORMAL

## 2021-06-14 PROCEDURE — 90833 PSYTX W PT W E/M 30 MIN: CPT | Performed by: PSYCHIATRY & NEUROLOGY

## 2021-06-14 PROCEDURE — 94761 N-INVAS EAR/PLS OXIMETRY MLT: CPT

## 2021-06-14 PROCEDURE — 93010 ELECTROCARDIOGRAM REPORT: CPT | Performed by: INTERNAL MEDICINE

## 2021-06-14 PROCEDURE — 99231 SBSQ HOSP IP/OBS SF/LOW 25: CPT | Performed by: PSYCHIATRY & NEUROLOGY

## 2021-06-14 PROCEDURE — 6370000000 HC RX 637 (ALT 250 FOR IP): Performed by: PSYCHIATRY & NEUROLOGY

## 2021-06-14 PROCEDURE — 94640 AIRWAY INHALATION TREATMENT: CPT

## 2021-06-14 PROCEDURE — 1240000000 HC EMOTIONAL WELLNESS R&B

## 2021-06-14 RX ADMIN — ACETAMINOPHEN 650 MG: 325 TABLET ORAL at 00:23

## 2021-06-14 RX ADMIN — PREGABALIN 25 MG: 25 CAPSULE ORAL at 08:47

## 2021-06-14 RX ADMIN — TIZANIDINE 2 MG: 2 TABLET ORAL at 21:56

## 2021-06-14 RX ADMIN — FLUTICASONE PROPIONATE 4 PUFF: 110 AEROSOL, METERED RESPIRATORY (INHALATION) at 21:55

## 2021-06-14 RX ADMIN — FLUTICASONE PROPIONATE 4 PUFF: 110 AEROSOL, METERED RESPIRATORY (INHALATION) at 08:47

## 2021-06-14 RX ADMIN — Medication 10 MG: at 21:56

## 2021-06-14 RX ADMIN — PREGABALIN 25 MG: 25 CAPSULE ORAL at 21:56

## 2021-06-14 ASSESSMENT — PAIN SCALES - GENERAL: PAINLEVEL_OUTOF10: 3

## 2021-06-14 NOTE — PROGRESS NOTES
Pt. attended the 0900 community meeting.  Electronically signed by Siomara Quick on 6/14/2021 at 1:29 PM

## 2021-06-14 NOTE — PROGRESS NOTES
[facility]. FOLLOW-UP NOTE       6/14/2021     Patient was seen and examined in person, Chart reviewed   Patient's case discussed with staff/team    Chief Complaint: Depression    Interim History:     Patient continued to report feeling depressed rating her mood to be 5 out of 10  However she declines to take medication  She reported that she wanted to go for [facility] treatment  Denied any suicidal thoughts  Has been having chronic hopeless and worthless feeling  Significant personality traits  Appetite:   [] Normal/Unchanged  [] Increased  [x] Decreased      Sleep:       [x] Normal/Unchanged  [] Fair       [] Poor              Energy:    [] Normal/Unchanged  [] Increased  [x] Decreased        SI [] Present  [x] Absent    HI  []Present  [x] Absent     Aggression:  [] yes  [x] no    Patient is [x] able  [] unable to CONTRACT FOR SAFETY     PAST MEDICAL/PSYCHIATRIC HISTORY:   Past Medical History:   Diagnosis Date    Arthritis     spine & fingers    Asthma     since childhood    Chronic back pain     Chronic headaches     Depression     Depression     PONV (postoperative nausea and vomiting)     Postpartum depression 09/16/2017    Seizures ([facility] [address]     last seizure in 2015. Pt reports seizure d/t medication reaction. FAMILY/SOCIAL HISTORY:  Family History   Problem Relation Age of Onset    Depression Mother     Arthritis Mother         TKR    High Blood Pressure Father         No congenital heart problems, no aneurysms.  Diabetes Father         Maritza Vilchis might have had colon cancer she will update me.  Arthritis Maternal Grandmother     Cancer Maternal Grandmother         Unsure    Diabetes Maternal Grandmother     Arthritis Paternal Grandmother     Diabetes Paternal Grandmother     Stroke Paternal Grandmother     Cancer Paternal Grandfather         Colon? ? She's not sure     Arthritis Sister     No Known Problems Brother     Other Son         pyloric stenosis / OR at 11 weeks age     Social History     Socioeconomic History    Marital status: Single     Spouse name: Not on file    Number of children: Not on file    Years of education: Not on file    Highest education level: Not on file   Occupational History    Not on file   Tobacco Use    Smoking status: Never Smoker    Smokeless tobacco: Never Used   Vaping Use    Vaping Use: Some days    Substances: THC   Substance and Sexual Activity    Alcohol use: Not Currently     Alcohol/week: 0.0 standard drinks    Drug use: Yes     Types: Marijuana     Comment: vape daily    Sexual activity: Yes     Partners: Male   Other Topics Concern    Not on file   Social History Narrative    Not on file     Social Determinants of Health     Financial Resource Strain: Medium Risk    Difficulty of Paying Living Expenses: Somewhat hard   Food Insecurity: No Food Insecurity    Worried About Running Out of Food in the Last Year: Never true    Kylah of Food in the Last Year: Never true   Transportation Needs: No Transportation Needs    Lack of Transportation (Medical): No    Lack of Transportation (Non-Medical): No   Physical Activity:     Days of Exercise per Week:     Minutes of Exercise per Session:    Stress:     Feeling of Stress :    Social Connections:     Frequency of Communication with Friends and Family:     Frequency of Social Gatherings with Friends and Family:     Attends Sabianism Services:     Active Member of Clubs or Organizations:     Attends Club or Organization Meetings:     Marital Status:    Intimate Partner Violence:     Fear of Current or Ex-Partner:     Emotionally Abused:     Physically Abused:     Sexually Abused:            ROS:  [x] All negative/unchanged except if checked.  Explain positive(checked items) below:  [] Constitutional  [] Eyes  [] Ear/Nose/Mouth/Throat  [] Respiratory  [] CV  [] GI  []   [] Musculoskeletal  [] Skin/Breast  [] Neurological  [] Endocrine  []

## 2021-06-14 NOTE — GROUP NOTE
Group Therapy Note    Date: 6/14/2021    Group Start Time: 1000  Group End Time: 1100  Group Topic: Psychoeducation    [facility]    Lore Bermudez, CTRS        Group Therapy Note    Attendees: 15         Patient's Goal:  \"to continue to be happy\"    Notes:  Pt. attended the 1000 skill group. Feels better. Worked on project with interest. Happy. Easily engaged in the group discussion. Status After Intervention:  Improved    Participation Level:  Active Listener and Interactive    Participation Quality: Appropriate, Attentive and Sharing      Speech:  normal      Thought Process/Content: Logical      Affective Functioning: Congruent      Mood: happy, calm      Level of consciousness:  Alert, Oriented x4 and Attentive      Response to Learning: Progressing to goal      Endings: None Reported    Modes of Intervention: Education, Support, Socialization and Activity      Discipline Responsible: Psychoeducational Specialist      Signature:  Yolanda Coburn

## 2021-06-14 NOTE — GROUP NOTE
Group Therapy Note    Date: 6/14/2021    Group Start Time: 7242  Group End Time: 1920  Group Topic: Recreational    [facility]    Sara Hutson        Group Therapy Note    Attendees: 11/20         Patient's Goal:  To engage in activity group. Notes:  Patient played [employer] with peers.     Status After Intervention:  Improved    Participation Level: Interactive    Participation Quality: Appropriate, Attentive and Supportive      Speech:  normal      Thought Process/Content: Logical      Affective Functioning: Congruent      Mood: euthymic      Level of consciousness:  Alert and Attentive      Response to Learning: Progressing to goal      Endings: None Reported    Modes of Intervention: Activity      Discipline Responsible: PATHSENSORS      Signature:  Sara Hutson

## 2021-06-14 NOTE — GROUP NOTE
Group Therapy Note    Date: 6/14/2021    Group Start Time: 1320  Group End Time: 1400  Group Topic: Cognitive Skills    [facility]    VIKY Evans        Group Therapy Note    Attendees: 13         Patient's Goal: To participate in mood management group. Notes:  Patient learned about green behavior. She stated her mood was good because she is nearing discharge. Status After Intervention:  Improved    Participation Level: Active Listener    Participation Quality: Appropriate      Speech:  normal      Thought Process/Content: Logical      Affective Functioning: Congruent      Mood: elevated      Level of consciousness:  Alert      Response to Learning: Able to verbalize current knowledge/experience      Endings: None Reported    Modes of Intervention: Education      Discipline Responsible: /Counselor      Signature:   VIKY Evans

## 2021-06-14 NOTE — GROUP NOTE
Group Therapy Note    Date: 6/14/2021    Group Start Time: 2000  Group End Time: 2045  Group Topic: Healthy Living/Wellness    [facility]    [address]        Group Therapy Note    Attendees: 13/19         Patient's Goal: To attend group.     Status After Intervention:  Improved    Participation Level: Interactive    Participation Quality: Sharing      Speech:  normal      Thought Process/Content: Logical      Affective Functioning: Congruent      Mood: euphoric       Level of consciousness:  Alert      Response to Learning: Progressing to goal      Endings: None Reported    Modes of Intervention: Socialization      Discipline Responsible: Behavorial Health Tech      Signature:  [address]

## 2021-06-14 NOTE — CARE COORDINATION
FAMILY COLLATERAL NOTE    Family/Support Hitesh Gonzalez  Contact #:[phone]  Relationship to Pt::step-dad's sister         Family/Support contact aware of hospitalization:yes  Presenting Symptoms/Current Concerns:Patient told her mom, \" I will not be here next weekend. \" Pateint had already been talking about being suicidal and wanting to die. The text said, \" Just forget it I wont be here by then. \" Patient has had suicidal thoughts before but this time was more intense. Been stating she cannot eat and brought all her food to her mothers house and said he could not eat. Her son was coming back to moms house, he will be three, and family is not sure if this triggered her. The family tries to get her involved with her son but she does not want to parent or spend time with him. Different times of day she feels different things and says different things. Patient wrote a 3 page letter and said to mom,    \" I want her to feel a miserable as me. \"       Top 3 Life Stressors:   1. Herself, 2. Herself 3. Herself  She believes she is disabled and will never have money, her career, or a . Background History Relevant to Current Hospitalization:  No past suicide attempts that collateral knows of. Years ago under 17 she had OD and had received tx but she refuses to get any tx now. Diagnosed with Aspergers when very young. She did not like this diagnosis so she goggles stuff and picks her diagnosis. In patients mind she had a very traumatic past but in reality she did not. She feels it intensely. Patient has lots of mood swings. Collateral is unsure if patient places herself in any risky behavior. She posts things on social media to attack people and she will put whatever she is feeing at the moment. Patient is going in august to [employer] so maybe she can follow up somewhere in [place].    Family Mental

## 2021-06-14 NOTE — GROUP NOTE
Group Therapy Note    Date: 6/14/2021    Group Start Time: 1600  Group End Time: 2878  Group Topic: Healthy Living/Wellness    [facility] ANAIS Cesar        Group Therapy Note    Attendees: 10/19         Patient's Goal:  To practice socialization skills. Notes:  Patient participated in group discussion.     Status After Intervention:  Improved    Participation Level: Interactive    Participation Quality: Appropriate and Attentive      Speech:  normal      Thought Process/Content: Logical      Affective Functioning: Congruent      Mood: euthymic      Level of consciousness:  Alert and Attentive      Response to Learning: Able to verbalize current knowledge/experience      Endings: None Reported    Modes of Intervention: Education      Discipline Responsible: [address], [employer]      Signature:  Vladimir Cesar

## 2021-06-14 NOTE — GROUP NOTE
Group Therapy Note    Date: 6/14/2021    Group Start Time: 1105  Group End Time: 6354  Group Topic: Psychotherapy    [facility]    SHERYL Meadows Tipbit        Group Therapy Note    Attendees: 11         Patient's Goal:  To go home    Notes:  Patient stated that she gives permission to call her collateral person     Status After Intervention:  Unchanged    Participation Level: Minimal    Participation Quality: Attentive      Speech:  normal      Thought Process/Content: Logical      Affective Functioning: Congruent      Mood: anxious      Level of consciousness:  Alert      Response to Learning: Progressing to goal      Endings: None Reported    Modes of Intervention: Support      Discipline Responsible: /Counselor      Signature:  SHERYL Meadows Tipbit

## 2021-06-14 NOTE — BH NOTE
Patient was out and social with peers. She blames her mother for her being here while admitting that she indirectly threatened suicide. She shares that she has done that for years and complains that her mother will not give her the attention she needs. She was verbal that her best friend \"ghosted\" her. She was educated about healthy ways to get needs met. Denies any SI, HI, or hallucinations. Poor insight.

## 2021-06-15 VITALS
HEIGHT: 66 IN | BODY MASS INDEX: 25.39 KG/M2 | DIASTOLIC BLOOD PRESSURE: 82 MMHG | SYSTOLIC BLOOD PRESSURE: 121 MMHG | HEART RATE: 107 BPM | RESPIRATION RATE: 18 BRPM | TEMPERATURE: 98.3 F | WEIGHT: 158 LBS | OXYGEN SATURATION: 99 %

## 2021-06-15 PROBLEM — R56.9 SEIZURES (HCC): Status: RESOLVED | Noted: 2019-11-05 | Resolved: 2021-06-15

## 2021-06-15 PROCEDURE — 94761 N-INVAS EAR/PLS OXIMETRY MLT: CPT

## 2021-06-15 PROCEDURE — 94640 AIRWAY INHALATION TREATMENT: CPT

## 2021-06-15 PROCEDURE — 99239 HOSP IP/OBS DSCHRG MGMT >30: CPT | Performed by: PSYCHIATRY & NEUROLOGY

## 2021-06-15 PROCEDURE — 6370000000 HC RX 637 (ALT 250 FOR IP): Performed by: PSYCHIATRY & NEUROLOGY

## 2021-06-15 RX ADMIN — ALBUTEROL SULFATE 2 PUFF: 90 AEROSOL, METERED RESPIRATORY (INHALATION) at 06:59

## 2021-06-15 RX ADMIN — FLUTICASONE PROPIONATE 4 PUFF: 110 AEROSOL, METERED RESPIRATORY (INHALATION) at 06:57

## 2021-06-15 RX ADMIN — PREGABALIN 25 MG: 25 CAPSULE ORAL at 08:48

## 2021-06-15 NOTE — GROUP NOTE
Group Therapy Note    Date: 6/15/2021    Group Start Time: 1000  Group End Time: 1050  Group Topic: Psychoeducation    [facility]    Maria Luisa Valentine        Group Therapy Note    Attendees: 10         Patient's Goal:  \"Be happy and be with my cat\"    Notes:  Patient's affect has improved, she was more hopeful and overall participation was good in group. Status After Intervention:  Improved    Participation Level:  Active Listener    Participation Quality: Appropriate      Speech:  normal      Thought Process/Content: Linear      Affective Functioning: Congruent      Mood: improved      Level of consciousness:  Alert      Response to Learning: Able to retain information      Endings: None Reported    Modes of Intervention: Education, Socialization and Activity      Discipline Responsible: Psychoeducational Specialist      Signature:  Mikaela Kwan

## 2021-06-15 NOTE — CARE COORDINATION
Discharge instructions reviewed verbally and in writing including f/u appointments. Patient verbalizes understanding and signed as such. All belongings returned for discharge. .  Patient denies SI, HI, A/V hallucinations, mood is stable.    Discharged with aunt for transport home

## 2021-06-15 NOTE — PROGRESS NOTES
Pt out on unit, social with peers. Pt brightened, smiling during conversation. Pt voiced decreased anxiety, depression. Pt reports showering today. Pt reports good appetite. Pt reports good sleep. Pt rates anxiety, 1 /10. Pt rates depression, 1 /10. On a scale from 1 through 10, 10 being the highest. Pt reports attending groups. Pt denies SI, HI and A/V hallucinations. Pt alert and oriented x 4. Will continue to monitor.

## 2021-06-15 NOTE — PROGRESS NOTES
Explained and gave am med lyrical mg pt denied questions, sitting with peers in the dinning room talking.

## 2021-06-15 NOTE — PROGRESS NOTES
Pt. attended the 0900 community meeting. Electronically signed by Makenna Robbins, [address] on 6/15/2021 at 9:29 AM

## 2021-06-15 NOTE — DISCHARGE SUMMARY
DISCHARGE SUMMARY      Patient ID:  Cedric Bautista  [id number]  60 y.o.  1990      Admit date: 6/12/2021    Discharge date and time: 6/15/2021    Admitting Physician: Shakila Smith MD     Discharge Physician: Dr Karen Mackey MD    Admission Diagnoses: Major depression, recurrent [facility] [F33.9]    Admission Condition: poor    Discharged Condition: stable    Admission Circumstance:     Ms. Cedric Bautista is a 31 y.o. female with a history of depression, who was brought to the ER on a pink slip from the [facility] for suicidal threats and depression. Per ER notes, Diane Russell is a 31 y.o. female per chart review has a h/o fibromyalgia, PCOS< depression/anxiety presents to the ED with depression.  Pt notes gradual onset, moderate, constant, depression since she was a child.  Pt has been sending texts that threaten to harm herself to her mother to get her attention.  Pt states she is not SI but is just trying to get her mother's attention.  Pt denies HI, AVH.  Pt denies fever, n/v, cp, sob, dysuria, diarrhea\" and \"Pt behavior cooperative very tearful upset with Mom and June Park for sending her to ED. Pt mood depressed. Affect sad. Eye contact good Speech normal Thought process and content intact No psychotic features noted or reported. No delusions, paranoia, or hallucinations. Pt admits to depressive symptoms her whole life denies that they have worsen over the past 2 weeks. Pt denies SI/HI denies self-harm at this time. Per Michael Tate pt had been texting mom all day SI. Mom referred pt for Michael Tate to assess. [facility] pink slip pt to ED. Pt is reporting she was trying to get mom's attention because she has been ignoring her. Pt does identify some stressors of being alone, having only 1 friend, mom does not check on her and feels like she does not care, a wilver she was seeing a couple months blocked her last week and her best friend of 16 yrs blocked her last week for no reason. Pt is feeling very lonely and alone.  Pt states she is prescribed medical marijuana for her fibromyalgia. Pt is currently not in treatment for [address] services and not taking any psychotropic medications. Pt reports she has had too many bad side effects from medications and can not take them. \"     HISTORY OF PRESENT ILLNESS:       The patient is a 31 y.o. female with significant past history of depression who was brought to the ER on a pink slip from the [facility] who was called by her mother after the patient had been sending her texts implying suicide. When interviewed today, the patient said she has 'normal depression' and was \"trying to talk to her mother\" and sent her texts; however she denied saying that she was going to commit suicide. She talked repeatedly about being upset with her mother who, she said, 'ignores her', and makes her upset. She talked about her mother who she feels is 'not loving\", and said she had sent the texts because she wanted the mother to talk to her. She said she was depressed 'all her life', and lost friends recently, due to increased depression. The patient reported that she \"said things like these for years, and she never did this\". She said she was sleeping OK at home, but not here. She said her appetite had been poor since she had Covid in December. She said she had lost a lot of weight. She said she has not been taking any medications recently ,because she felt they were not working; she is scheduled to start rTMS in August. She said she had tried a lot of medications for depression over the years, since her childhood. She denied homicidal ideation. She denied hallucinations, paranoia or other delusions.           Stressors: as above     The patient is not currently receiving care for the above psychiatric illness.     Medications Prior to Admission:     Prescriptions Prior to Admission   Medications Prior to Admission: pregabalin (LYRICA) 25 MG capsule, Take 25 mg by mouth 2 times daily.   tiZANidine (ZANAFLEX) 2 MG tablet, Take 2 mg by mouth nightly   VENTOLIN  (90 Base) MCG/ACT inhaler, inhale 1 puffs by mouth EVERY 6 HOURS AS NEEDED FOR WHEEZING OR SHORTNESS OF BREATH  fluticasone (FLOVENT HFA) 220 MCG/ACT inhaler, Inhale 2 puffs into the lungs 2 times daily        Compliance: no     Psychiatric Review of Systems       Depression: yes     Josephine or Hypomania:  no     Panic Attacks:  no     Phobias:  no     Obsessions and Compulsions:  no     PTSD : no     Hallucinations:  no     Delusions:  no     Substance Abuse History:  ETOH: \"very rarely   Marijuana: yes  Opiates: no  Other Drugs: no        Past Psychiatric History:  Prior Diagnosis:  depression  Psychiatrist: no; however she said she last saw one \"2 weeks ago'  Therapist: not current; has tried in the past  Hospitalization: yes  Hx of Suicidal Attempts: yes, once, 12 years ago, \"after a bad breakup\"  Hx of violence:  no  ECT: no  Previous discontinued Psychiatric Med Trials: n/a        PAST MEDICAL/PSYCHIATRIC HISTORY:   Past Medical History:   Diagnosis Date    Arthritis     spine & fingers    Asthma     since childhood    Chronic back pain     Chronic headaches     Depression     Depression     PONV (postoperative nausea and vomiting)     Postpartum depression 09/16/2017    Seizures ([facility] [address]     last seizure in 2015. Pt reports seizure d/t medication reaction. FAMILY/SOCIAL HISTORY:  Family History   Problem Relation Age of Onset    Depression Mother     Arthritis Mother         TKR    High Blood Pressure Father         No congenital heart problems, no aneurysms.  Diabetes Father         Jayant Paz might have had colon cancer she will update me.  Arthritis Maternal Grandmother     Cancer Maternal Grandmother         Unsure    Diabetes Maternal Grandmother     Arthritis Paternal Grandmother     Diabetes Paternal Grandmother     Stroke Paternal Grandmother     Cancer Paternal Grandfather         Colon? ? She's not sure     Arthritis Sister     No Known Problems Brother     Other Son         pyloric stenosis / OR at 10 weeks age     Social History     Socioeconomic History    Marital status: Single     Spouse name: Not on file    Number of children: Not on file    Years of education: Not on file    Highest education level: Not on file   Occupational History    Not on file   Tobacco Use    Smoking status: Never Smoker    Smokeless tobacco: Never Used   Vaping Use    Vaping Use: Some days    Substances: THC   Substance and Sexual Activity    Alcohol use: Not Currently     Alcohol/week: 0.0 standard drinks    Drug use: Yes     Types: Marijuana     Comment: vape daily    Sexual activity: Yes     Partners: Male   Other Topics Concern    Not on file   Social History Narrative    Not on file     Social Determinants of Health     Financial Resource Strain: Medium Risk    Difficulty of Paying Living Expenses: Somewhat hard   Food Insecurity: No Food Insecurity    Worried About Running Out of Food in the Last Year: Never true    Kylah of Food in the Last Year: Never true   Transportation Needs: No Transportation Needs    Lack of Transportation (Medical): No    Lack of Transportation (Non-Medical):  No   Physical Activity:     Days of Exercise per Week:     Minutes of Exercise per Session:    Stress:     Feeling of Stress :    Social Connections:     Frequency of Communication with Friends and Family:     Frequency of Social Gatherings with Friends and Family:     Attends Gnosticism Services:     Active Member of Clubs or Organizations:     Attends Club or Organization Meetings:     Marital Status:    Intimate Partner Violence:     Fear of Current or Ex-Partner:     Emotionally Abused:     Physically Abused:     Sexually Abused:        MEDICATIONS:    Current Facility-Administered Medications:     melatonin disintegrating tablet 10 mg, 10 mg, Oral, Nightly, Yue Harris MD, 10 mg at 06/14/21 2156    fluticasone (FLOVENT HFA) 110 MCG/ACT Other      Mental Status Examination on discharge:    Level of consciousness:  within normal limits   Appearance:  well-appearing  Behavior/Motor:  no abnormalities noted  Attitude toward examiner:  attentive and good eye contact  Speech:  spontaneous, normal rate and normal volume   Mood: euthymic  Affect:  mood congruent  Thought processes:  linear   Thought content:  Suicidal Ideation:  denies suicidal ideation  Delusions:  no evidence of delusions  Perceptual Disturbance:  denies any perceptual disturbance  Cognition:  oriented to person, place, and time   Concentration intact  Memory intact  Insight good   Judgement fair   Fund of Knowledge adequate      ASSESSMENT:  Patient symptoms are:  [x] Well controlled  [x] Improving  [] Worsening  [] No change      Diagnosis:  Active Problems:    Major depression, recurrent ([facility] [address]  Resolved Problems:    * No resolved hospital problems. *  Borderline PD    LABS:    Recent Labs     06/12/21  1700   WBC 8.1   HGB 15.3        Recent Labs     06/12/21  1700      K 4.1      CO2 24   BUN 14   CREATININE 0.73   GLUCOSE 82     Recent Labs     06/12/21  1700   BILITOT 0.6   ALKPHOS 63   AST 11   ALT 14     Lab Results   Component Value Date    LABAMPH Neg 06/12/2021    BARBSCNU Neg 06/12/2021    LABBENZ Neg 06/12/2021    LABMETH Neg 06/12/2021    OPIATESCREENURINE Neg 06/12/2021    PHENCYCLIDINESCREENURINE Neg 06/12/2021    ETOH <10 06/12/2021     Lab Results   Component Value Date    TSH 0.654 06/12/2021     Lab Results   Component Value Date    LITHIUM 0.6 03/26/2018     No results found for: VALPROATE, CBMZ    RISK ASSESSMENT AT DISCHARGE: Low risk for suicide and homicide. Treatment Plan:  Reviewed current Medications with the patient. Education provided on the complaince with treatment. Risks, benefits, side effects, drug-to-drug interactions and alternatives to treatment were discussed.     Encourage patient to attend outpatient follow up appointment

## 2021-06-15 NOTE — GROUP NOTE
Group Therapy Note    Date: 6/14/2021    Group Start Time: 2100  Group End Time: 2115  Group Topic: Wrap-Up    [facility]        Group Therapy Note    Attendees: 12/20         Patient's Goal:  \"to have a good day\"    Notes:  Patient reported meeting their goal for the day.     Status After Intervention:  Unchanged    Participation Level: Interactive    Participation Quality: Appropriate, Attentive and Sharing      Speech:  normal      Thought Process/Content: Logical      Affective Functioning: Congruent      Mood: euthymic      Level of consciousness:  Alert and Attentive      Response to Learning: Progressing to goal      Endings: None Reported    Modes of Intervention: Support      Discipline Responsible: Appticles      Signature:  Oliva Ge

## 2021-06-16 LAB
EKG ATRIAL RATE: 72 BPM
EKG P AXIS: 11 DEGREES
EKG P-R INTERVAL: 106 MS
EKG Q-T INTERVAL: 466 MS
EKG QRS DURATION: 94 MS
EKG QTC CALCULATION (BAZETT): 510 MS
EKG R AXIS: 60 DEGREES
EKG T AXIS: 46 DEGREES
EKG VENTRICULAR RATE: 72 BPM

## 2021-06-16 NOTE — GROUP NOTE
Group Therapy Note    Date: 6/15/2021    Group Start Time: 1100  Group End Time: 8407  Group Topic: Psychotherapy    [facility] I    [address]        Group Therapy Note    Attendees: 10         Patient's Goal:  To be discharged    Notes:  Patient stated she has a support system    Status After Intervention:  Improved    Participation Level: Interactive    Participation Quality: Appropriate      Speech:  normal      Thought Process/Content: Logical      Affective Functioning: Congruent      Mood: anxious      Level of consciousness:  Alert      Response to Learning: Progressing to goal      Endings: None Reported    Modes of Intervention: Support      Discipline Responsible: /Counselor      Signature:  Gustavo Camarillo

## 2021-06-21 PROBLEM — F33.2 SEVERE EPISODE OF RECURRENT MAJOR DEPRESSIVE DISORDER, WITHOUT PSYCHOTIC FEATURES (HCC): Status: ACTIVE | Noted: 2021-06-12

## 2021-07-05 RX ORDER — FLUTICASONE PROPIONATE 220 UG/1
AEROSOL, METERED RESPIRATORY (INHALATION)
Qty: 3 INHALER | Refills: 1 | Status: SHIPPED | OUTPATIENT
Start: 2021-07-05

## 2021-07-05 NOTE — TELEPHONE ENCOUNTER
Rx requested:  Requested Prescriptions     Pending Prescriptions Disp Refills    FLOVENT  MCG/ACT inhaler [Pharmacy Med Name: [address] [place] [address]  MCG INHALER] 12 g      Sig: inhale 2 puffs by mouth and INTO THE LUNGS twice a day       Last Office Visit:   5/12/2021      Next Visit Date:  Future Appointments   Date Time Provider Joe Murillo   10/11/2021  2:30 PM Samir Clay MD [address]

## 2021-08-02 ENCOUNTER — TELEPHONE (OUTPATIENT)
Dept: FAMILY MEDICINE CLINIC | Age: 31
End: 2021-08-02

## 2023-06-08 NOTE — TELEPHONE ENCOUNTER
From: Surjit Hutchins  Sent: 9/25/2018 8:59 AM EDT  Subject: Medication Renewal Request    Dre Wells.  Mary Dinh would like a refill of the following medications:     levonorgestrel-ethinyl estradiol (NORDETTE) 0.15-30 MG-MCG per tablet Kelly Burgess MD]    Preferred pharmacy: [facility] [address]    Comment: [facility] Counseling: I discussed with the patient the risks of valacyclovir including but not limited to kidney damage, nausea, vomiting and severe allergy.  The patient understands that if the infection seems to be worsening or is not improving, they are to call.

## (undated) DEVICE — PENCIL SMOKE EVAC PUSH BUTTON COATED

## (undated) DEVICE — PAD,NON-ADHERENT,3X8,STERILE,LF,1/PK: Brand: MEDLINE

## (undated) DEVICE — PACK,LAPAROTOMY,NO GOWNS: Brand: MEDLINE

## (undated) DEVICE — STAPLER SKIN SQ 30 ABSRB STPL DISP INSORB

## (undated) DEVICE — CATHETER IV 14 GAX2 IN STR INTROCAN SAFETY

## (undated) DEVICE — SHEET,DRAPE,53X77,STERILE: Brand: MEDLINE

## (undated) DEVICE — LABEL MED MINI W/ MARKER

## (undated) DEVICE — 3M™ IOBAN™ 2 ANTIMICROBIAL INCISE DRAPE 6650EZ: Brand: IOBAN™ 2

## (undated) DEVICE — DRESSING TELFA STRL 3X6

## (undated) DEVICE — ASTOUND STANDARD SURGICAL GOWN, XL: Brand: CONVERTORS

## (undated) DEVICE — SYRINGE IRRIG 60ML SFT PLIABLE BLB EZ TO GRP 1 HND USE W/

## (undated) DEVICE — COLLAR CERV UNIV AD L13-19IN TRACH OPN TWO PC RIG POLYETH

## (undated) DEVICE — 3M™ STERI-DRAPE™ INSTRUMENT POUCH 1018: Brand: STERI-DRAPE™

## (undated) DEVICE — GOWN,AURORA,NONREINFORCED,LARGE: Brand: MEDLINE

## (undated) DEVICE — CARBIDE MATCH HEAD

## (undated) DEVICE — SUTURE VCRL SZ 0 L36IN ABSRB VLT L36MM CT-1 1/2 CIR J346H

## (undated) DEVICE — STERILE NEOPRENE POWDER-FREE SURGICAL GLOVES WITH NITRILE COATING: Brand: PROTEXIS

## (undated) DEVICE — TOWEL,OR,DSP,ST,BLUE,STD,4/PK,20PK/CS: Brand: MEDLINE

## (undated) DEVICE — ELECTRODE PT RET AD L9FT HI MOIST COND ADH HYDRGEL CORDED

## (undated) DEVICE — GAUZE,SPONGE,4"X4",16PLY,XRAY,STRL,LF: Brand: MEDLINE

## (undated) DEVICE — TUBING, SUCTION, 1/4" X 10', STRAIGHT: Brand: MEDLINE

## (undated) DEVICE — GLOVE SURG SZ 8 L12IN FNGR THK94MIL TRNSLUC YEL LTX HYDRGEL

## (undated) DEVICE — CODMAN® SURGICAL PATTIES 1/2" X1 1/2" (1.27CM X 3.81CM): Brand: CODMAN®

## (undated) DEVICE — PAD,ABDOMINAL,8"X10",ST,LF: Brand: MEDLINE

## (undated) DEVICE — CHLORAPREP 26ML ORANGE

## (undated) DEVICE — INTENDED FOR TISSUE SEPARATION, AND OTHER PROCEDURES THAT REQUIRE A SHARP SURGICAL BLADE TO PUNCTURE OR CUT.: Brand: BARD-PARKER ® CARBON RIB-BACK BLADES

## (undated) DEVICE — MARKER SURG SKIN GENTIAN VLT REG TIP W/ 6IN RUL

## (undated) DEVICE — DIFFUSER: Brand: CORE, MAESTRO

## (undated) DEVICE — SUTURE VCRL SZ 3-0 L27IN ABSRB VLT CT-2 L26MM 1/2 CIR J332H

## (undated) DEVICE — Device

## (undated) DEVICE — DRAPE SURG W41XL74IN CLR FULL SZ C ARM 3 ADH POLY STRP E

## (undated) DEVICE — WAX SURG 2.5GM HEMSTAT BNE BEESWAX PARAFFIN ISO PALMITATE

## (undated) DEVICE — MONITORING NEURO W INTERPRETATION SYS PRICING

## (undated) DEVICE — CODMAN® SURGICAL PATTIES 1/2" X 1/2" (1.27CM X 1.27CM): Brand: CODMAN®

## (undated) DEVICE — SUTURE VCRL SZ 2-0 L36IN ABSRB VLT L36MM CT-1 1/2 CIR J345H

## (undated) DEVICE — OIL CARTRIDGE: Brand: CORE, MAESTRO

## (undated) DEVICE — TIDISHIELD TRANSPORT, CONTAINMENT COVER FOR BACK TABLE 4'6" (1.37M) TO 8' (2.43M) IN LENGTH: Brand: TIDISHIELD

## (undated) DEVICE — BIPOLAR IRRIGATOR INTEGRATED TUBING AND BIPOLAR CORD SET, DISPOSABLE

## (undated) DEVICE — TAPE,CLOTH/SILK,CURAD,3"X10YD,LF,40/CS: Brand: CURAD

## (undated) DEVICE — GLOVE ORANGE PI 7 1/2   MSG9075

## (undated) DEVICE — SPONGE GZ W4XL4IN RAYON POLY FILL CVR W/ NONWOVEN FAB

## (undated) DEVICE — COUNTER NDL 40 COUNT HLD 70 FOAM BLK ADH W/ MAG

## (undated) DEVICE — 4-PORT MANIFOLD: Brand: NEPTUNE 2

## (undated) DEVICE — SUTURE VCRL SZ 2-0 L27IN ABSRB VLT L36MM CT-1 1/2 CIR J339H

## (undated) DEVICE — AGENT HEMSTAT 1GM PORCINE GEL ABSRB PWD FOR CONT OOZING

## (undated) DEVICE — CODMAN® SURGICAL PATTIES 3/4" X 3/4" (1.91CM X 1.91CM): Brand: CODMAN®

## (undated) DEVICE — BANDAGE,GAUZE,BULKEE II,4.5"X4.1YD,STRL: Brand: MEDLINE